# Patient Record
Sex: MALE | Race: WHITE | NOT HISPANIC OR LATINO | Employment: OTHER | URBAN - METROPOLITAN AREA
[De-identification: names, ages, dates, MRNs, and addresses within clinical notes are randomized per-mention and may not be internally consistent; named-entity substitution may affect disease eponyms.]

---

## 2017-02-20 ENCOUNTER — HISTORICAL (OUTPATIENT)
Dept: WOUND CARE | Facility: HOSPITAL | Age: 72
End: 2017-02-20

## 2017-04-05 ENCOUNTER — HISTORICAL (OUTPATIENT)
Dept: CARDIOLOGY | Facility: CLINIC | Age: 72
End: 2017-04-05

## 2017-05-17 ENCOUNTER — HISTORICAL (OUTPATIENT)
Dept: ADMINISTRATIVE | Facility: HOSPITAL | Age: 72
End: 2017-05-17

## 2017-05-17 LAB
ABS NEUT (OLG): 5.2 X10(3)/MCL (ref 2.1–9.2)
ALBUMIN SERPL-MCNC: 3.7 GM/DL (ref 3.4–5)
ALBUMIN/GLOB SERPL: 1 {RATIO}
ALP SERPL-CCNC: 75 UNIT/L (ref 20–120)
ALT SERPL-CCNC: 89 UNIT/L
AST SERPL-CCNC: 44 UNIT/L
BASOPHILS # BLD AUTO: 0.04 X10(3)/MCL
BASOPHILS NFR BLD AUTO: 0 % (ref 0–1)
BILIRUB SERPL-MCNC: 1.1 MG/DL
BILIRUBIN DIRECT+TOT PNL SERPL-MCNC: <0.1 MG/DL
BILIRUBIN DIRECT+TOT PNL SERPL-MCNC: >1 MG/DL
BUN SERPL-MCNC: 31 MG/DL (ref 7–25)
CALCIUM SERPL-MCNC: 9.3 MG/DL (ref 8.4–10.3)
CHLORIDE SERPL-SCNC: 107 MMOL/L (ref 96–110)
CHOLEST SERPL-MCNC: 174 MG/DL
CHOLEST/HDLC SERPL: 5 {RATIO} (ref 0–5)
CO2 SERPL-SCNC: 26 MMOL/L (ref 24–32)
CREAT SERPL-MCNC: 1.35 MG/DL (ref 0.7–1.4)
EOSINOPHIL # BLD AUTO: 0.16 X10(3)/MCL
EOSINOPHIL NFR BLD AUTO: 2 % (ref 0–5)
ERYTHROCYTE [DISTWIDTH] IN BLOOD BY AUTOMATED COUNT: 15 % (ref 11.5–14.5)
EST. AVERAGE GLUCOSE BLD GHB EST-MCNC: 128 MG/DL
GLOBULIN SER-MCNC: 2.6 GM/ML (ref 2.3–3.5)
GLUCOSE SERPL-MCNC: 119 MG/DL (ref 65–99)
HBA1C MFR BLD: 6.1 % (ref 4.7–5.6)
HCT VFR BLD AUTO: 41.4 % (ref 40–51)
HDLC SERPL-MCNC: 35 MG/DL
HGB BLD-MCNC: 13.7 GM/DL (ref 13.5–17.5)
IMM GRANULOCYTES # BLD AUTO: 0.02 10*3/UL
IMM GRANULOCYTES NFR BLD AUTO: 0 %
LDLC SERPL CALC-MCNC: 116 MG/DL (ref 0–130)
LYMPHOCYTES # BLD AUTO: 1.45 X10(3)/MCL
LYMPHOCYTES NFR BLD AUTO: 20 % (ref 15–40)
MCH RBC QN AUTO: 29.2 PG (ref 26–34)
MCHC RBC AUTO-ENTMCNC: 33.1 GM/DL (ref 31–37)
MCV RBC AUTO: 88.3 FL (ref 80–100)
MONOCYTES # BLD AUTO: 0.57 X10(3)/MCL
MONOCYTES NFR BLD AUTO: 8 % (ref 4–12)
NEUTROPHILS # BLD AUTO: 5.2 X10(3)/MCL
NEUTROPHILS NFR BLD AUTO: 70 X10(3)/MCL
PLATELET # BLD AUTO: 198 X10(3)/MCL (ref 130–400)
PMV BLD AUTO: 11 FL (ref 7.4–10.4)
POTASSIUM SERPL-SCNC: 4.3 MMOL/L (ref 3.6–5.2)
PROT SERPL-MCNC: 6.3 GM/DL (ref 6–8)
RBC # BLD AUTO: 4.69 X10(6)/MCL (ref 4.5–5.9)
SODIUM SERPL-SCNC: 141 MMOL/L (ref 135–146)
T4 FREE SERPL-MCNC: 0.99 NG/DL (ref 0.6–1.15)
TRIGL SERPL-MCNC: 114 MG/DL
TSH SERPL-ACNC: 0.74 MIU/L (ref 0.5–5)
VLDLC SERPL CALC-MCNC: 23 MG/DL
WBC # SPEC AUTO: 7.4 X10(3)/MCL (ref 4.5–11)

## 2017-07-12 ENCOUNTER — HISTORICAL (OUTPATIENT)
Dept: ADMINISTRATIVE | Facility: HOSPITAL | Age: 72
End: 2017-07-12

## 2017-11-10 ENCOUNTER — HISTORICAL (OUTPATIENT)
Dept: CARDIOLOGY | Facility: HOSPITAL | Age: 72
End: 2017-11-10

## 2017-11-10 LAB
ALBUMIN SERPL-MCNC: 3.8 GM/DL (ref 3.4–5)
ALBUMIN/GLOB SERPL: 1 RATIO (ref 1–2)
ALP SERPL-CCNC: 107 UNIT/L (ref 45–117)
ALT SERPL-CCNC: 31 UNIT/L (ref 12–78)
AST SERPL-CCNC: 21 UNIT/L (ref 15–37)
BILIRUB SERPL-MCNC: 0.5 MG/DL (ref 0.2–1)
BILIRUBIN DIRECT+TOT PNL SERPL-MCNC: 0.2 MG/DL
BILIRUBIN DIRECT+TOT PNL SERPL-MCNC: 0.3 MG/DL
BUN SERPL-MCNC: 20 MG/DL (ref 7–18)
CALCIUM SERPL-MCNC: 9.7 MG/DL (ref 8.5–10.1)
CHLORIDE SERPL-SCNC: 106 MMOL/L (ref 98–107)
CHOLEST SERPL-MCNC: 193 MG/DL
CHOLEST/HDLC SERPL: 4.6 {RATIO} (ref 0–5)
CO2 SERPL-SCNC: 29 MMOL/L (ref 21–32)
CREAT SERPL-MCNC: 1.2 MG/DL (ref 0.6–1.3)
GLOBULIN SER-MCNC: 3.6 GM/ML (ref 2.3–3.5)
GLUCOSE SERPL-MCNC: 115 MG/DL (ref 74–106)
HDLC SERPL-MCNC: 42 MG/DL
LDLC SERPL CALC-MCNC: 110 MG/DL (ref 0–130)
POTASSIUM SERPL-SCNC: 4.4 MMOL/L (ref 3.5–5.1)
PROT SERPL-MCNC: 7.4 GM/DL (ref 6.4–8.2)
SODIUM SERPL-SCNC: 142 MMOL/L (ref 136–145)
TRIGL SERPL-MCNC: 207 MG/DL
VLDLC SERPL CALC-MCNC: 41 MG/DL

## 2018-01-19 ENCOUNTER — HISTORICAL (OUTPATIENT)
Dept: CARDIOLOGY | Facility: CLINIC | Age: 73
End: 2018-01-19

## 2018-01-19 LAB
ALBUMIN SERPL-MCNC: 3.7 GM/DL (ref 3.4–5)
ALBUMIN/GLOB SERPL: 1 RATIO (ref 1–2)
ALP SERPL-CCNC: 109 UNIT/L (ref 45–117)
ALT SERPL-CCNC: 25 UNIT/L (ref 12–78)
AST SERPL-CCNC: 22 UNIT/L (ref 15–37)
BILIRUB SERPL-MCNC: 0.4 MG/DL (ref 0.2–1)
BILIRUBIN DIRECT+TOT PNL SERPL-MCNC: 0.1 MG/DL
BILIRUBIN DIRECT+TOT PNL SERPL-MCNC: 0.3 MG/DL
BUN SERPL-MCNC: 33 MG/DL (ref 7–18)
CALCIUM SERPL-MCNC: 9.2 MG/DL (ref 8.5–10.1)
CHLORIDE SERPL-SCNC: 108 MMOL/L (ref 98–107)
CHOLEST SERPL-MCNC: 118 MG/DL
CHOLEST/HDLC SERPL: 3.2 {RATIO} (ref 0–5)
CO2 SERPL-SCNC: 27 MMOL/L (ref 21–32)
CREAT SERPL-MCNC: 1.3 MG/DL (ref 0.6–1.3)
GLOBULIN SER-MCNC: 3.5 GM/ML (ref 2.3–3.5)
GLUCOSE SERPL-MCNC: 113 MG/DL (ref 74–106)
HDLC SERPL-MCNC: 37 MG/DL
LDLC SERPL CALC-MCNC: 53 MG/DL (ref 0–130)
POTASSIUM SERPL-SCNC: 4.2 MMOL/L (ref 3.5–5.1)
PROT SERPL-MCNC: 7.2 GM/DL (ref 6.4–8.2)
SODIUM SERPL-SCNC: 141 MMOL/L (ref 136–145)
TRIGL SERPL-MCNC: 138 MG/DL
VLDLC SERPL CALC-MCNC: 28 MG/DL

## 2018-03-15 ENCOUNTER — HISTORICAL (OUTPATIENT)
Dept: ADMINISTRATIVE | Facility: HOSPITAL | Age: 73
End: 2018-03-15

## 2018-05-07 ENCOUNTER — HISTORICAL (OUTPATIENT)
Dept: RADIOLOGY | Facility: HOSPITAL | Age: 73
End: 2018-05-07

## 2019-01-17 ENCOUNTER — HISTORICAL (OUTPATIENT)
Dept: CARDIOLOGY | Facility: CLINIC | Age: 74
End: 2019-01-17

## 2019-02-05 ENCOUNTER — HISTORICAL (OUTPATIENT)
Dept: RADIOLOGY | Facility: HOSPITAL | Age: 74
End: 2019-02-05

## 2019-05-12 ENCOUNTER — HOSPITAL ENCOUNTER (OUTPATIENT)
Dept: MEDSURG UNIT | Facility: HOSPITAL | Age: 74
End: 2019-05-13
Attending: INTERNAL MEDICINE | Admitting: INTERNAL MEDICINE

## 2019-10-04 ENCOUNTER — HISTORICAL (OUTPATIENT)
Dept: INTERNAL MEDICINE | Facility: CLINIC | Age: 74
End: 2019-10-04

## 2019-11-13 ENCOUNTER — HISTORICAL (OUTPATIENT)
Dept: RADIOLOGY | Facility: HOSPITAL | Age: 74
End: 2019-11-13

## 2020-01-07 ENCOUNTER — HISTORICAL (OUTPATIENT)
Dept: INTERNAL MEDICINE | Facility: CLINIC | Age: 75
End: 2020-01-07

## 2020-01-20 ENCOUNTER — HISTORICAL (OUTPATIENT)
Dept: ADMINISTRATIVE | Facility: HOSPITAL | Age: 75
End: 2020-01-20

## 2020-02-26 ENCOUNTER — HISTORICAL (OUTPATIENT)
Dept: RADIOLOGY | Facility: HOSPITAL | Age: 75
End: 2020-02-26

## 2020-03-11 ENCOUNTER — HISTORICAL (OUTPATIENT)
Dept: CARDIOLOGY | Facility: HOSPITAL | Age: 75
End: 2020-03-11

## 2020-03-20 ENCOUNTER — HISTORICAL (OUTPATIENT)
Dept: RESPIRATORY THERAPY | Facility: HOSPITAL | Age: 75
End: 2020-03-20

## 2020-04-01 ENCOUNTER — HOSPITAL ENCOUNTER (OUTPATIENT)
Dept: MEDSURG UNIT | Facility: HOSPITAL | Age: 75
End: 2020-04-03
Attending: INTERNAL MEDICINE | Admitting: INTERNAL MEDICINE

## 2020-04-21 ENCOUNTER — HISTORICAL (OUTPATIENT)
Dept: LAB | Facility: HOSPITAL | Age: 75
End: 2020-04-21

## 2020-04-23 ENCOUNTER — HISTORICAL (OUTPATIENT)
Dept: CARDIOLOGY | Facility: HOSPITAL | Age: 75
End: 2020-04-23

## 2020-09-09 ENCOUNTER — HISTORICAL (OUTPATIENT)
Dept: RADIOLOGY | Facility: HOSPITAL | Age: 75
End: 2020-09-09

## 2020-11-10 ENCOUNTER — HISTORICAL (OUTPATIENT)
Dept: LAB | Facility: HOSPITAL | Age: 75
End: 2020-11-10

## 2020-12-01 ENCOUNTER — HISTORICAL (OUTPATIENT)
Dept: RADIOLOGY | Facility: HOSPITAL | Age: 75
End: 2020-12-01

## 2021-10-26 ENCOUNTER — HISTORICAL (OUTPATIENT)
Dept: ADMINISTRATIVE | Facility: HOSPITAL | Age: 76
End: 2021-10-26

## 2021-10-26 LAB
ABS NEUT (OLG): 6.21 X10(3)/MCL (ref 2.1–9.2)
ALBUMIN SERPL-MCNC: 3.9 GM/DL (ref 3.4–4.8)
ALBUMIN/GLOB SERPL: 1.2 RATIO (ref 1.1–2)
ALP SERPL-CCNC: 104 UNIT/L (ref 40–150)
ALT SERPL-CCNC: 22 UNIT/L (ref 0–55)
ANTINUCLEAR ANTIBODY SCREEN (OHS): NEGATIVE
APPEARANCE, UA: CLEAR
AST SERPL-CCNC: 25 UNIT/L (ref 5–34)
BACTERIA #/AREA URNS AUTO: ABNORMAL /HPF
BASOPHILS # BLD AUTO: 0 X10(3)/MCL (ref 0–0.2)
BASOPHILS NFR BLD AUTO: 0 %
BILIRUB SERPL-MCNC: 0.7 MG/DL
BILIRUB UR QL STRIP: NEGATIVE
BILIRUBIN DIRECT+TOT PNL SERPL-MCNC: 0.3 MG/DL (ref 0–0.5)
BILIRUBIN DIRECT+TOT PNL SERPL-MCNC: 0.4 MG/DL (ref 0–0.8)
BUN SERPL-MCNC: 25.4 MG/DL (ref 8.4–25.7)
CALCIUM SERPL-MCNC: 10.1 MG/DL (ref 8.7–10.5)
CHLORIDE SERPL-SCNC: 107 MMOL/L (ref 98–107)
CHOLEST SERPL-MCNC: 140 MG/DL
CHOLEST/HDLC SERPL: 4 {RATIO} (ref 0–5)
CO2 SERPL-SCNC: 26 MMOL/L (ref 23–31)
COLOR UR: YELLOW
CREAT SERPL-MCNC: 1.35 MG/DL (ref 0.73–1.18)
CREAT UR-MCNC: 39.7 MG/DL (ref 58–161)
DEPRECATED CALCIDIOL+CALCIFEROL SERPL-MC: 78.8 NG/ML (ref 30–80)
DSDNA ANTIBODY (OHS): NEGATIVE
EOSINOPHIL # BLD AUTO: 0.2 X10(3)/MCL (ref 0–0.9)
EOSINOPHIL NFR BLD AUTO: 2 %
ERYTHROCYTE [DISTWIDTH] IN BLOOD BY AUTOMATED COUNT: 18.3 % (ref 11.5–14.5)
EST CREAT CLEARANCE SER (OHS): 43.91 ML/MIN
EST. AVERAGE GLUCOSE BLD GHB EST-MCNC: 128.4 MG/DL
GLOBULIN SER-MCNC: 3.3 GM/DL (ref 2.4–3.5)
GLUCOSE (UA): NEGATIVE
GLUCOSE SERPL-MCNC: 124 MG/DL (ref 82–115)
HBA1C MFR BLD: 6.1 %
HCT VFR BLD AUTO: 34.4 % (ref 40–51)
HDLC SERPL-MCNC: 37 MG/DL (ref 35–60)
HGB BLD-MCNC: 11.3 GM/DL (ref 13.5–17.5)
HGB UR QL STRIP: NEGATIVE
HYALINE CASTS #/AREA URNS LPF: ABNORMAL /LPF
IMM GRANULOCYTES # BLD AUTO: 0.03 10*3/UL
IMM GRANULOCYTES NFR BLD AUTO: 0 %
KETONES UR QL STRIP: NEGATIVE
LDLC SERPL CALC-MCNC: 88 MG/DL (ref 50–140)
LEUKOCYTE ESTERASE UR QL STRIP: NEGATIVE
LYMPHOCYTES # BLD AUTO: 1.3 X10(3)/MCL (ref 0.6–4.6)
LYMPHOCYTES NFR BLD AUTO: 16 %
MCH RBC QN AUTO: 27.4 PG (ref 26–34)
MCHC RBC AUTO-ENTMCNC: 32.8 GM/DL (ref 31–37)
MCV RBC AUTO: 83.3 FL (ref 80–100)
MONOCYTES # BLD AUTO: 0.6 X10(3)/MCL (ref 0.1–1.3)
MONOCYTES NFR BLD AUTO: 7 %
NEUTROPHILS # BLD AUTO: 6.21 X10(3)/MCL (ref 2.1–9.2)
NEUTROPHILS NFR BLD AUTO: 74 %
NITRITE UR QL STRIP: NEGATIVE
NRBC BLD AUTO-RTO: 0 % (ref 0–0.2)
PH UR STRIP: 6 [PH] (ref 4.5–8)
PLATELET # BLD AUTO: 256 X10(3)/MCL (ref 130–400)
PMV BLD AUTO: 9.4 FL (ref 7.4–10.4)
POTASSIUM SERPL-SCNC: 4.8 MMOL/L (ref 3.5–5.1)
PROT SERPL-MCNC: 7.2 GM/DL (ref 5.8–7.6)
PROT UR QL STRIP: 100 MG/DL
PROT UR STRIP-MCNC: 17.2 MG/DL
PROT/CREAT UR-RTO: 433.2 MG/GM CR
RBC # BLD AUTO: 4.13 X10(6)/MCL (ref 4.5–5.9)
RBC #/AREA URNS AUTO: ABNORMAL /HPF
SERINE PROT 3-ARUP: 1 AU/ML
SODIUM SERPL-SCNC: 138 MMOL/L (ref 136–145)
SP GR UR STRIP: 1.01 (ref 1–1.03)
SQUAMOUS #/AREA URNS LPF: ABNORMAL /LPF
TRIGL SERPL-MCNC: 74 MG/DL (ref 34–140)
UROBILINOGEN UR STRIP-ACNC: NORMAL
VLDLC SERPL CALC-MCNC: 15 MG/DL
WBC # SPEC AUTO: 8.4 X10(3)/MCL (ref 4.5–11)
WBC #/AREA URNS AUTO: ABNORMAL /HPF

## 2021-11-16 ENCOUNTER — HISTORICAL (OUTPATIENT)
Dept: RADIOLOGY | Facility: HOSPITAL | Age: 76
End: 2021-11-16

## 2021-11-22 ENCOUNTER — HISTORICAL (OUTPATIENT)
Dept: RADIOLOGY | Facility: HOSPITAL | Age: 76
End: 2021-11-22

## 2021-12-15 ENCOUNTER — HISTORICAL (OUTPATIENT)
Dept: RADIOLOGY | Facility: HOSPITAL | Age: 76
End: 2021-12-15

## 2022-01-31 ENCOUNTER — HISTORICAL (OUTPATIENT)
Dept: INTERNAL MEDICINE | Facility: CLINIC | Age: 77
End: 2022-01-31

## 2022-01-31 LAB
ALBUMIN SERPL-MCNC: 3.7 G/DL (ref 3.4–4.8)
ALBUMIN/GLOB SERPL: 1.2 {RATIO} (ref 1.1–2)
ALP SERPL-CCNC: 97 U/L (ref 40–150)
ALT SERPL-CCNC: 20 U/L (ref 0–55)
APPEARANCE, UA: CLEAR
AST SERPL-CCNC: 18 U/L (ref 5–34)
BACTERIA SPEC CULT: NORMAL
BILIRUB SERPL-MCNC: 0.6 MG/DL
BILIRUB UR QL STRIP: NEGATIVE
BILIRUBIN DIRECT+TOT PNL SERPL-MCNC: 0.2 (ref 0–0.5)
BILIRUBIN DIRECT+TOT PNL SERPL-MCNC: 0.4 (ref 0–0.8)
BUN SERPL-MCNC: 20.6 MG/DL (ref 8.4–25.7)
CALCIUM SERPL-MCNC: 9.5 MG/DL (ref 8.7–10.5)
CHLORIDE SERPL-SCNC: 111 MMOL/L (ref 98–107)
CO2 SERPL-SCNC: 23 MMOL/L (ref 23–31)
COLOR UR: YELLOW
CREAT SERPL-MCNC: 1.31 MG/DL (ref 0.73–1.18)
CREAT UR-MCNC: 199.6 MG/DL (ref 58–161)
GLOBULIN SER-MCNC: 3 G/DL (ref 2.4–3.5)
GLUCOSE (UA): NORMAL
GLUCOSE SERPL-MCNC: 109 MG/DL (ref 82–115)
HEMOLYSIS INTERF INDEX SERPL-ACNC: 1
HGB UR QL STRIP: NEGATIVE
HYALINE CASTS #/AREA URNS LPF: NORMAL /[LPF]
ICTERIC INTERF INDEX SERPL-ACNC: 1
KETONES UR QL STRIP: NEGATIVE
LEUKOCYTE ESTERASE UR QL STRIP: NEGATIVE
LIPEMIC INTERF INDEX SERPL-ACNC: 1
MUCOUS THREADS URNS QL MICRO: NORMAL
NITRITE UR QL STRIP: NEGATIVE
PH UR STRIP: 6 [PH] (ref 4.5–8)
POTASSIUM SERPL-SCNC: 4.4 MMOL/L (ref 3.5–5.1)
PROT SERPL-MCNC: 6.7 G/DL (ref 5.8–7.6)
PROT UR QL STRIP: NORMAL
PROT UR STRIP-MCNC: 175.8 MG/DL
PROT/CREAT UR-RTO: 880.8
RBC #/AREA URNS HPF: NORMAL /[HPF] (ref 0–5)
SODIUM SERPL-SCNC: 139 MMOL/L (ref 136–145)
SP GR UR STRIP: 1.02 (ref 1–1.03)
SQUAMOUS EPITHELIAL, UA: NORMAL
UROBILINOGEN UR STRIP-ACNC: NORMAL
WBC #/AREA URNS HPF: NORMAL /[HPF] (ref 0–5)

## 2022-03-02 ENCOUNTER — HISTORICAL (OUTPATIENT)
Dept: RADIOLOGY | Facility: HOSPITAL | Age: 77
End: 2022-03-02

## 2022-04-09 ENCOUNTER — HISTORICAL (OUTPATIENT)
Dept: ADMINISTRATIVE | Facility: HOSPITAL | Age: 77
End: 2022-04-09
Payer: COMMERCIAL

## 2022-04-25 VITALS
DIASTOLIC BLOOD PRESSURE: 62 MMHG | BODY MASS INDEX: 24.99 KG/M2 | HEIGHT: 68 IN | WEIGHT: 164.88 LBS | OXYGEN SATURATION: 99 % | SYSTOLIC BLOOD PRESSURE: 150 MMHG

## 2022-04-30 NOTE — PROGRESS NOTES
"   Patient:   Justyn Perez            MRN: [id number]            FIN: [id number]               Age:   71 years     Sex:  Male     :  1945   Associated Diagnoses:   Rotator cuff syndrome of left shoulder; Arthritis of left shoulder region; Right wrist pain   Author:   Elliot Kelly NP      Chief Complaint   2017 13:06 CDT      Lt. shoulder pain and Rt. wrist pain        History of Present Illness   72 yo  male presents to ortho clinic for f/u for left shoulder pain and c/o right wrist pain.  Reports that he got "good" relief with previous  left shoulder injection and requests repeat injection today for symptom relief.  Admits doing previously instructed ROM/stretching exercises to left UE.  Reports that he is not currently having any pain to his right wrist but states "I had a little pain a few weeks ago and just wanted to get it checked today".  Denies any numbness/tingling to bilateral UE.  Admits taking prescribed medication as needed for pain with moderate relief.  No other complaints today.            Review of Systems   ROS reviewed as documented in chart      Health Status   Allergies:    Allergic Reactions (Selected)  Severity Not Documented  Penicillin G benzathine- No reactions were documented.,    Allergies (1) Active Reaction  penicillin G benzathine None Documented     Current medications:  (Selected)   Outpatient Medications  Ordered  Triple Antibiotic Ointment topical: 1 dose(s), form: Ointment, TOP, Once, first dose 12/07/15 11:00:00 CST, stop date 12/07/15 11:00:00 CST, Apply to procedure site prior to discharge.  Prescriptions  Prescribed  Norvasc 10 mg oral tablet: 10 mg = 1 tab(s), Oral, Daily, # 30 tab(s), 6 Refill(s), Pharmacy: [facility]  atorvastatin 80 mg oral tablet: 80 mg = 1 tab(s), Oral, Daily, # 90 tab(s), 3 Refill(s), Pharmacy: [facility]  carvedilol 25 mg oral tablet: 25 mg = 1 tab(s), Oral, BID, # 180 tab(s), 3 Refill(s), Pharmacy: " [facility]  hydrOXYzine hydrochloride 50 mg oral tablet: 50 mg = 1 tab(s), Oral, Once a day (at bedtime), # 90 tab(s), 2 Refill(s), Pharmacy: [facility]  lactulose 10 g/15 mL oral syrup: 20 gm = 30 mL, Oral, TID, # 480 mL, 3 Refill(s), Pharmacy: [facility]  lisinopril 10 mg oral tablet: 20 mg = 2 tab(s), Oral, Daily, # 180 tab(s), 3 Refill(s), Pharmacy: [facility]  meloxicam 7.5 mg oral tablet: 7.5 mg = 1 tab(s), Oral, BID, # 60 tab(s), 3 Refill(s), Pharmacy: [facility]  Documented Medications  Documented  Nitrostat 0.4 mg sublingual TAB: 0.4 mg = 1 tab(s), SL, q5min, PRN for chest pain, # 100 tab(s), 0 Refill(s)  Tylenol 8 HR Arthritis Pain: 1,300 mg, Oral, q8hr, 0 Refill(s)  Viagra 50 mg oral tablet: 50 mg = 1 tab(s), Oral, Daily, 0 Refill(s)  aspirin 325 mg oral tablet: 325 mg = 1 tab(s), Oral, Daily, # 30 tab(s), 0 Refill(s),    Home Medications (11) Active  aspirin 325 mg oral tablet 325 mg = 1 tab(s), Oral, Daily  atorvastatin 80 mg oral tablet 80 mg = 1 tab(s), Oral, Daily  carvedilol 25 mg oral tablet 25 mg = 1 tab(s), Oral, BID  hydrOXYzine hydrochloride 50 mg oral tablet 50 mg = 1 tab(s), Oral, Once a day (at bedtime)  lactulose 10 g/15 mL oral syrup 20 gm = 30 mL, Oral, TID  lisinopril 10 mg oral tablet 20 mg = 2 tab(s), Oral, Daily  meloxicam 7.5 mg oral tablet 7.5 mg = 1 tab(s), Oral, BID  Nitrostat 0.4 mg sublingual TAB 0.4 mg = 1 tab(s), PRN, SL, q5min  Norvasc 10 mg oral tablet 10 mg = 1 tab(s), Oral, Daily  Tylenol 8 HR Arthritis Pain 1,300 mg, Oral, q8hr  Viagra 50 mg oral tablet 50 mg = 1 tab(s), Oral, Daily  ,    No qualifying data available     Problem list:    All Problems  Aortic stenosis, severe(  Confirmed  ) / SNOMED CT 76569598 / Confirmed  Aortic valve disorder / SNOMED CT 04115119 / Confirmed  Chest pain(  Confirmed  ) / SNOMED CT 20491246 / Confirmed  Congestive heart failure(  Confirmed  ) / SNOMED CT 80811608 / Confirmed  Constipation /  SNOMED CT 06519974 / Confirmed  HLD (hyperlipidemia)(  Confirmed  ) / SNOMED CT 19102057 / Confirmed  HTN (hypertension)(  Confirmed  ) / SNOMED CT 5399452068 / Confirmed  Insomnia / SNOMED CT 226147912 / Confirmed  PVD (peripheral vascular disease)(  Confirmed  ) / SNOMED CT 5238635260 / Confirmed,    Active Problems (9)  Aortic stenosis, severe(  Confirmed  )   Aortic valve disorder   Chest pain(  Confirmed  )   Congestive heart failure(  Confirmed  )   Constipation   HLD (hyperlipidemia)(  Confirmed  )   HTN (hypertension)(  Confirmed  )   Insomnia   PVD (peripheral vascular disease)(  Confirmed  )         Histories   Past Medical History:    No active or resolved past medical history items have been selected or recorded.   Family History:    CVA (cerebral infarction)  Father  Parkinson disease.  Mother     Procedure history:    Femoral-popliteal artery bypass graft with vein ([id number]) on 1/1/2010 at 64 Years.  Comments:  2/12/2015 16:38 - Contributor_system, [email or web address]  06/17/2014 15:52:01 - Alva Quintero: with grafts  Aorta to superior mesenteric artery bypass graft ([id number]) on 1/1/2009 at 63 Years.  Fluoroscopy and stent renal artery ([id number]) on 1/1/2008 at 62 Years.  CEA - Carotid endarterectomy ([id number]) on 1/1/2006 at 60 Years.   Social History        Social & Psychosocial Habits    Alcohol  09/11/2014 Risk Assessment: Denies Alcohol Use    08/11/2016  Use: Past    Employment/School  04/10/2015  Status: Retired    Highest education: High school    Exercise  03/06/2015 Risk Assessment: Does not exercise    02/15/2017  Times per week: 3-4 times/week    Self assessment: Fair condition    Exercise type: Walking, Weight lifting    Home/Environment  04/10/2015  Lives with: Spouse    Living situation: Home/Independent    Alcohol abuse in household: No    Substance abuse in household: No    Smoker in household: Yes    Feels unsafe at home: No    Safe place to go: Yes     Family/Friends available to help: Yes    Nutrition/Health  02/15/2017  Type of diet: Regular    Caffeine intake amount: 2 coke zero per day    Substance Abuse  09/11/2014 Risk Assessment: Denies Substance Abuse    08/11/2016  Use: Never    Tobacco  09/11/2014 Risk Assessment: Denies Tobacco Use    08/11/2016  Use: Former smoker    Comment: QUIT 10-12 YEARS AGO - 08/11/2016 11:47 - Misty Kirkland LPN        Physical Examination   Measurements from flowsheet : Measurements   7/12/2017 13:06 CDT      Weight Dosing             76.4 kg                             Weight Measured           76.4 kg                             Weight Measured and Calculated in Lbs     168.43 lb                             Height/Length Dosing      172.72 cm                             Height/Length Measured    172.72 cm                             BSA Measured              1.91 m2                             Body Mass Index Measured  25.61 kg/m2     General:  Alert and oriented.    HENT:  Normocephalic.    Neck:  Supple.    Integumentary:  Warm, Dry, Pink.    Neurologic:  No focal deficits.    Cognition and Speech:  Speech clear and coherent.    Psychiatric:  Appropriate mood & affect.    Left shoulder exam:  Good ROM with increased pain associated with upward/lateral movement.  TTP to supraspinatus tendon and AC joint.  Supraspinatus stress (+).  Drop arm (-).  Bennett (+).  Neer (-).  NV intact.  No swelling or gross deformity noted.  +2 radial pulse.  FF = 140.  ER = 50.  IR = L-spine.  Abduction = 120.  No periscapular atrophy noted.   Right wrist exam:  Good ROM with no pain associated with movement.  No TTP noted.  No swelling, bruising or erythema noted.  NV intact.  +2 radial pulse.  Skin intact.  Cap refill < 2 sec.      Health Maintenance      Health Maintenance     Pending (in the next year)        OverDue           Aspirin Therapy for CVD Prevention due  09/11/15  and every 1  year(s)           Colorectal Screening due   04/28/16  and every 1  year(s)        Due            Abdominal Aortic Aneurysm Screening due  07/12/17  and every 1  year(s)           Alcohol Misuse Screening due  07/12/17  and every 1  year(s)           HF-Ejection Fraction Past 13 Months if Hospitalized due  07/12/17  and every 1  year(s)           HF-Fluid Restriction/Low Sodium Diet Education due  07/12/17  Variable frequency           HF-Heart Failure Education due  07/12/17  and every 1  year(s)           HF-LVEF due  07/12/17  and every 1  year(s)           Hypertension Management-Education due  07/12/17  and every 1  year(s)           Smoking Cessation due  07/12/17  and every 1  year(s)           Zoster Vaccine due  07/12/17  and every 100  year(s)        Due In Future            Influenza Vaccine not due until  10/02/17  and every 1  year(s)           Hypertension Management-Blood Pressure not due until  11/17/17  and every 6  month(s)           HF-ACEI/ARB Prescribed if Clinically Indicated not due until  12/12/17  and every 1  year(s)           HF-Beta Blocker Prescribed if Clinically Indicated not due until  12/12/17  and every 1  year(s)           Hypertension Management-BMP not due until  05/17/18  and every 1  year(s)           Blood Pressure Screening not due until  05/17/18  and every 1  year(s)           Depression Screening not due until  05/17/18  and every 1  year(s)     Satisfied (in the past 1 year)        Satisfied            Blood Pressure Screening on  05/17/17.  Satisfied by Elgin Jenkins RN           Body Mass Index Check on  07/12/17.  Satisfied by Jazzmine Fisher LPN           Depression Screening on  05/17/17.  Satisfied by Elgin Jenkins RN           Diabetes Screening on  05/17/17.  Satisfied by Yisel Mayorga           HF-ACEI/ARB Prescribed if Clinically Indicated on  12/12/16.  Satisfied by Thuan Dominguez           HF-Beta Blocker Prescribed if Clinically Indicated on  12/12/16.   Satisfied by Thuan Dominguez           Hypertension Management-Blood Pressure on  05/17/17.  Satisfied by Elgin Jenkins RN           Obesity Screening on  07/12/17.  Satisfied by Jazzmine Fisher LPN           Pneumococcal Vaccine on  02/15/17.  Satisfied by Becky Wright LPN          Procedure   Joint aspiration/ injection procedure   Date/ Time:  7/12/2017 14:43:00.     Confirmed: patient, procedure, side, site, safety procedures followed.     Performed by: self.     Informed consent: signed by patient.     Indication: symptomatic relief.     Location: the left shoulder.     Preparation and technique: skin prep alcohol/iodophor (Dura Prep), sterile preparation of site in usual fashion.     Joint injected: with  Lidocaine 1% 5mL and Kenalog 40mg 1mL.     Procedure tolerated: well.        Review / Management   Results review:     No qualifying data available.       Left shoulder x-rays (4/6/16):    Radiology Report  Exam: Left shoulder 4 views.      COMPARISON: None.     INDICATION: Left shoulder injury due to fall 15 years ago. Chronic  pain with no new trauma. Initial assessment.     FINDINGS: There are mild to moderate acromioclavicular and  glenohumeral joint degenerative changes. There is moderate aortic  atherosclerosis. There is mild cardiomegaly. There are interstitial  opacities within the image lung suggestive of fibrosis and scarring,  with mild pulmonary vascular congestion and pulmonary edema not  excluded. There are tiny left neck metallic soft tissue clips. The  axillary view demonstrates mild humeral head irregularity, which may  be related to remote trauma. There is no acute fracture or  dislocation.     IMPRESSION:     Mild to moderate left shoulder degenerative changes.        Impression and Plan   Diagnosis     Rotator cuff syndrome of left shoulder ([id number] M75.102).     Arthritis of left shoulder region (HYQ10-FJ M19.012).     Right wrist pain ([id number]  M25.531).       Plan:  1.  Left shoulder injection.  2.  Continue ROM/stretching exercises to left UE as previously instructed.  3.  Continue prescribed medication as needed for pain relief.  4.  Ice compresses and activity modifications as needed for symptom relief.  5.  RTC PRN.       Patient Instructions:  Joint Injection, Care After, Osteoarthritis, Rotator Cuff Injury.

## 2022-04-30 NOTE — ED PROVIDER NOTES
"   Patient:   Justyn Perez            MRN: [id number]            FIN: [id number]               Age:   73 years     Sex:  Male     :  1945   Associated Diagnoses:   CHF NYHA class I   Author:   Nivia Christianson MD      Basic Information   Time seen: Date & time 2019 08:00:00.   History source: Patient.   Arrival mode: Private vehicle.   History limitation: None.   Additional information: Chief Complaint from Nursing Triage Note : Chief Complaint   2019 7:47 CDT       Chief Complaint           Pt c/o epigastric pain and sob x 3 weeks. Pt denies CP, pt states "when I eat I feel like my food just stays in my chest." Pt denies difficulty swallowing/choking  .      History of Present Illness   The patient presents with difficulty breathing and Bloating sensation in the stomach.  The onset was 4  weeks ago.  The course/duration of symptoms is constant.  Degree at onset mild.  Degree at present mild.  The Exacerbating factors is exertion.  The Relieving factors is rest.  Risk factors consist of congestive heart failure and hypertension.  Prior episodes: occasional.  Associated symptoms: chest pain, abdominal pain, Mainly bloating sensation in the epigastric region, denies nausea and denies vomiting.  According to patient he went to [facility] on the Easter and he was dancing and got short of breath, he had to sit down in past and also that he was able to dance without any problem.  Next, he had an episode a few days back, very got short of breath again, but he got better without doing any particular thing, and then he has been feeling like the food is sitting in his stomach and does not go down.  Today decided to come get checked..        Review of Systems   Constitutional symptoms:  No fever, no chills, no sweats.    Skin symptoms:  No jaundice, no rash.    Eye symptoms:  Negative except as documented in HPI.   ENMT symptoms:  No sore throat,    Respiratory symptoms:  Shortness of breath, " no cough, no wheezing.    Cardiovascular symptoms:  No chest pain, no palpitations, no tachycardia.    Gastrointestinal symptoms:  Abdominal pain, epigastric, pressure, no nausea, no vomiting, no diarrhea, no constipation.    Genitourinary symptoms:  No dysuria,    Musculoskeletal symptoms:  No back pain,    Neurologic symptoms:  No headache, no dizziness.    Psychiatric symptoms:  No anxiety, no depression, no substance abuse.    Allergy/immunologic symptoms:  No seasonal allergies,              Additional review of systems information: All other systems reviewed and otherwise negative.      Health Status   Allergies:    Allergic Reactions (Selected)  Severity Not Documented  Penicillin G benzathine- No reactions were documented..   Medications:  (Selected)   Prescriptions  Prescribed  atorvastatin 80 mg oral tablet: 80 mg = 1 tab(s), Oral, Daily, # 90 tab(s), 2 Refill(s), Pharmacy: [facility]  carvedilol 25 mg oral tablet: 25 mg = 1 tab(s), Oral, BID, # 180 tab(s), 2 Refill(s), Pharmacy: [facility]  Documented Medications  Documented  sildenafil 50 mg oral tablet: 50 mg = 1 tab(s), Oral, Daily, # 30 tab(s), 0 Refill(s), per nurse's notes.      Past Medical/ Family/ Social History   Medical history:    Active  Aortic stenosis, Mild (SNOMED CT 17287894): Onset on 5/1/2018 at 72 years.  Congestive heart failure(  Confirmed  ) (SNOMED CT 43687499)  PVD (peripheral vascular disease)(  Confirmed  ) (SNOMED CT 3748027102)  HTN (hypertension) (SNOMED CT 6489685026)  Resolved  Cardiac ejection fraction 61% (SNOMED CT 083510238): Onset on 5/1/2018 at 72 years.  Resolved.  Comments:  5/12/2019 CDT 8:00 CDT - Nivia Christianson MD  61% on ECHO, Reviewed as documented in chart.   Surgical history:    Femoral-popliteal artery bypass graft with vein ([id number]) on 1/1/2010 at 64 Years.  Comments:  2/12/2015 16:38 - Contributor_system, [id number]  06/17/2014 15:52:01 - Alva Quintero: with grafts  Aorta to  superior mesenteric artery bypass graft ([id number]) on 1/1/2009 at 63 Years.  Fluoroscopy and stent renal artery ([id number]) on 1/1/2008 at 62 Years.  CEA - Carotid endarterectomy ([id number]) on 1/1/2006 at 60 Years., Reviewed as documented in chart.   Family history:    CVA (cerebral infarction)  Father  Parkinson disease.  Mother  , Reviewed as documented in chart.   Social history: Alcohol use: Denies, Tobacco use: Denies, Drug use: Denies.   Problem list:    Active Problems (13)  Aortic stenosis, Mild   Aortic valve disorder   Chest pain(  Confirmed  )   Congestive heart failure(  Confirmed  )   Constipation   HLD (hyperlipidemia)(  Confirmed  )   HTN (hypertension)   HTN (hypertension)(  Confirmed  )   Insomnia   Joint pain   PVD (peripheral vascular disease)   PVD (peripheral vascular disease)(  Confirmed  )   Well adult exam , per nurse's notes.      Physical Examination               Vital Signs   Vital Signs   5/12/2019 7:47 CDT       Temperature Oral          36.7 DegC                             Temperature Oral (calculated)             98.06 DegF                             Peripheral Pulse Rate     67 bpm                             Respiratory Rate          20 br/min                             SpO2                      97 %                             Oxygen Therapy            Room air                             Systolic Blood Pressure   178 mmHg  HI                             Diastolic Blood Pressure  86 mmHg  .   Measurements   5/12/2019 7:47 CDT       Weight Dosing             70.25 kg                             Weight Measured           70.25 kg                             Weight Measured and Calculated in Lbs     154.87 lb                             Height/Length Dosing      172 cm                             Height/Length Measured    172 cm                             Body Mass Index Measured  23.75 kg/m2  .   Basic Oxygen Information   5/12/2019 8:02 CDT       SpO2                       97 %                             Oxygen Therapy            Room air    2019 7:47 CDT       SpO2                      97 %                             Oxygen Therapy            Room air  .   General:  Alert, no acute distress.    Patti coma scale:  Total score: Total score: 15.   Neurological:  Alert and oriented to person, place, time, and situation, normal motor observed, normal speech observed.    Skin:  Normal for ethnicity.   Head:  Normocephalic.   Neck:  Supple.   Eye:  Extraocular movements are intact, No icterus.    Ears, nose, mouth and throat:  Oral mucosa moist, no pharyngeal erythema or exudate.    Cardiovascular:  Regular rate and rhythm, No murmur, Normal peripheral perfusion, Edema: Bilateral, pedal, 1+.    Respiratory:  Respirations are non-labored, breath sounds are equal, Breath sounds: Right, posterior, crackles present, rales present.    Back:  Nontender, Normal range of motion.    Musculoskeletal:  Normal ROM.   Chest wall   Gastrointestinal:  Soft, Nontender, Non distended, Normal bowel sounds.    Lymphatics   Psychiatric:  Cooperative, appropriate mood & affect.       Medical Decision Making   Documents reviewed:  Emergency department nurses' notes.   Orders  Launch Order Profile (Selected)   Inpatient Orders  Ordered  Blood Pressure: 19 8:12:00 CDT, Stop date 19 8:12:00 CDT, Monitor blood pressure.  Cardiac Monitorin19 8:12:00 CDT, Constant Order, Place on telemetry.  Pulse Oximetry: 19 8:12:00 CDT, Stop date 19 8:12:00 CDT, Place on pulse oximetry.  Monitor oxygen saturation.  Saline Lock Insert: 19 8:12:00 CDT, Stop date 19 8:12:00 CDT  Vital Signs: 19 8:12:00 CDT, q30min  Ordered (Exam Ordered)  XR Chest 2 Views: Stat, 19 8:12:00 CDT, Dyspnea, None, Wheelchair, Patient Has IV?, Rad Type, Not Scheduled, 19 8:12:00 CDT  Ordered (In-Lab)  Automated Diff: Stat collect, 19 8:12:00 CDT, Blood, Collected, Stop date  05/12/19 8:12:00 CDT, Lab Collect, 05/12/19 8:12:00 CDT  B-Type Natriuretic Peptide Pro Brain: Stat collect, 05/12/19 8:12:00 CDT, Blood, Stop date 05/12/19 8:12:00 CDT, Lab Collect, 05/12/19 8:12:00 CDT  CBC w/ Auto Diff: Stat collect, 05/12/19 8:12:00 CDT, Blood, Stop date 05/12/19 8:12:00 CDT, Lab Collect, 05/12/19 8:12:00 CDT  CK MB: Stat collect, 05/12/19 8:12:00 CDT, Blood, Stop date 05/12/19 8:12:00 CDT, Lab Collect, Print Label By Order Location, 05/12/19 8:12:00 CDT  CMP: Stat collect, 05/12/19 8:12:00 CDT, Blood, Stop date 05/12/19 8:12:00 CDT, Lab Collect, 05/12/19 8:12:00 CDT  CPK: Stat collect, 05/12/19 8:12:00 CDT, Blood, Stop date 05/12/19 8:12:00 CDT, Lab Collect, Print Label By Order Location, 05/12/19 8:12:00 CDT  Troponin-I: Stat collect, 05/12/19 8:12:00 CDT, Blood, Stop date 05/12/19 8:12:00 CDT, Lab Collect, 05/12/19 8:12:00 CDT  Completed  EKG Adult 12 Lead: 05/12/19 8:12:00 CDT, Stat, Chest Pain, 05/12/19 8:12:00 CDT, Wheelchair, Patient Has IV, Standard Precautions, Show to provider upon completion., -1, -1, 05/12/19 8:12:00 CDT.   Electrocardiogram:  Time 5/12/2019 07:52:00, rate 65, normal sinus rhythm, No ST-T changes, no ectopy, normal AL & QRS intervals, LVH with repolarization abnormality..    Results review:  Lab results : Lab View   5/12/2019 8:12 CDT       Sodium Lvl                141 mmol/L                             Potassium Lvl             4.1 mmol/L                             Chloride                  110 mmol/L  HI                             CO2                       27 mmol/L                             Calcium Lvl               9.1 mg/dL                             Glucose Lvl               133 mg/dL  HI                             BUN                       22 mg/dL  HI                             Creatinine                1.10 mg/dL                             eGFR-AA                   84 mL/min  LOW                             eGFR-MARY                  70 mL/min  LOW                              Bili Total                0.6 mg/dL                             Bili Direct               0.2 mg/dL                             Bili Indirect             0.4 mg/dL                             AST                       19 unit/L                             ALT                       25 unit/L                             Alk Phos                  86 unit/L                             Total Protein             6.2 gm/dL  LOW                             Albumin Lvl               3.0 gm/dL  LOW                             Globulin                  3.20 gm/mL                             A/G Ratio                 0.9 ratio  LOW                             NT pro BNP.               3,660 pg/mL  HI                             Total CK                  71 unit/L                             CK MB                     2.3 ng/mL                             Troponin-I                0.063 ng/mL  HI                             WBC                       7.6 x10(3)/mcL                             RBC                       4.23 x10(6)/mcL  LOW                             Hgb                       13.0 gm/dL  LOW                             Hct                       38.6 %  LOW                             Platelet                  275 x10(3)/mcL                             MCV                       91.3 fL                             MCH                       30.7 pg                             MCHC                      33.7 gm/dL                             RDW                       15.6 %  HI                             MPV                       10.7 fL  HI                             Abs Neut                  5.45 x10(3)/mcL                             Neutro Auto               71 x10(3)/mcL  NA                             Lymph Auto                17 %                             Mono Auto                 8 %                             Eos Auto                  2  NA                             Abs Eos                    0.18 x10(3)/mcL  NA                             Basophil Auto             1  NA                             Abs Neutro                5.45 x10(3)/mcL  NA                             Abs Lymph                 1.33 x10(3)/mcL  NA                             Abs Mono                  0.61 x10(3)/mcL  NA                             Abs Baso                  0.05 x10(3)/mcL  NA                             IG%                       0 %  NA                             IG#                       0.0200  NA  ,    No qualifying data available.    Radiology results:  05/12/2019 9:50; Meghan OSBORNE, [facility]; ;   XR Chest 2 views:  No infiltrate, mass or other acute cardiopulmonary abnormality.    .      Reexamination/ Reevaluation   Time: 5/12/2019 09:51:00 .   Vital signs   results included from flowsheet : Vital Signs   5/12/2019 9:00 CDT       Peripheral Pulse Rate     58 bpm  LOW                             Respiratory Rate          16 br/min                             SpO2                      97 %                             Oxygen Therapy            Room air                             Systolic Blood Pressure   157 mmHg  HI                             Diastolic Blood Pressure  92 mmHg  HI                             Mean Arterial Pressure, Cuff              114 mmHg     Course: well controlled.   Interventions: PowerOrders   Pharmacy:  nitroglycerin 2% transdermal ointment (Order): 1 in, form: Ointment, TOP, Once-NOW, first dose 5/12/2019 9:59 CDT, stop date 5/12/2019 9:59 CDT.   Notes: Patient is doing good at rest, he only gets short of breath after activity and some epigastric lower chest fullness after eating    Patient has rales in the right lower lung fields, chest x-ray does not show any particular pneumonia, he does have some vascular congestion, BNP slightly elevated, his troponin is 0.06, I am going to admit him in the hospital on telemetry, repeat his cardiac enzymes, and get the cardiologist see  him.  His last EF was 61% in May 2018..      Impression and Plan   Diagnosis   CHF NYHA class I ([id number] I50.9)      Calls-Consults   -  5/12/2019 09:53:00 , Miky OSBORNE, Tomas ARGUETA, recommends OK.    Plan   Disposition: Admit time  5/12/2019 09:57:00, Place in Observation Telemetry Unit.    Counseled: Patient, Family, Regarding diagnosis, Regarding diagnostic results.

## 2022-04-30 NOTE — ED PROVIDER NOTES
Patient:   Justyn Peerz            MRN: [id number]            FIN: [id number]               Age:   74 years     Sex:  Male     :  1945   Associated Diagnoses:   Acute exacerbation of congestive heart failure   Author:   Magy OSBORNE, Stu RODRIGUEZ      Basic Information   Time seen: Date 2020, Immediately upon arrival.   History source: Patient.   Arrival mode: Ambulance.   History limitation: None.   Additional information: Chief Complaint from Nursing Triage Note : Chief Complaint   2020 10:17 CDT       Chief Complaint           pt to ER via [facility] for CP.  started 4 days ago.  also SOB.  , nitrox3, nitro paste 1inch.  pt states needs valve replacement  .      History of Present Illness   This patient presents with shortness of breath.  Patient states that he began with shortness of breath over the last 4 days.  It slowly worsening.  At this point it is constant, moderate to severe and currently present.  Symptoms worse with exertion as well as with tension to lie flat.  Patient states he takes his weight daily and has not gained any weight.  The patient states she's also been having some chest tightness the last 3 or 4 days it's on and off.  He received aspirin, nitroglycerin prior to arrival.  Patient denies any fever or chills.  Denies any body aches.      Review of Systems   Constitutional symptoms:  Negative except as documented in HPI.   Skin symptoms:  Negative except as documented in HPI.   Eye symptoms:  Negative except as documented in HPI.   ENMT symptoms:  Negative except as documented in HPI.   Respiratory symptoms:  Negative except as documented in HPI.   Cardiovascular symptoms:  Negative except as documented in HPI.   Gastrointestinal symptoms:  Negative except as documented in HPI.   Musculoskeletal symptoms:  Negative except as documented in HPI.   Neurologic symptoms:  Negative except as documented in HPI.   Hematologic/Lymphatic symptoms:  Negative except as  documented in HPI.             Additional review of systems information: All other systems reviewed and otherwise negative.      Health Status   Allergies:    Allergic Reactions (Selected)  Severity Not Documented  Penicillin G benzathine- No reactions were documented..   Medications:  (Selected)   Prescriptions  Prescribed  Coreg 12.5 mg oral tablet: 12.5 mg = 1 tab(s), Oral, BID, Do not take if systolic blood pressure (top number) is less than 90, # 60 tab(s), 3 Refill(s), Pharmacy: [facility], 172.72, cm, Height/Length Dosing, 02/05/20 13:28:00 CST, 67.8, kg, Weight Dosing, 02/05/20 13:2...  DuoNeb 0.5 mg-2.5 mg/3 mL inhalation solution: 3 mL, INH, QID, PRN PRN as needed for shortness of breath or wheezing, # 90 mL, 3 Refill(s), Pharmacy: [facility], 172, cm, Height/Length Dosing, 01/07/20 14:42:00 CST, 68.5, kg, Weight Dosing, 01/07/20 14:42:00 CST  Flonase 50 mcg/inh nasal spray: 1 spray(s), Nasal, BID, PRN PRN allergy symptoms, in each nostril, # 16 gm, 3 Refill(s), Pharmacy: [facility], 172.72, cm, Height/Length Dosing, 02/05/20 13:28:00 CST, 67.8, kg, Weight Dosing, 02/05/20 13:28:00 CST  Lasix 20 mg oral tablet: 20 mg = 1 tab(s), Oral, Daily, PRN PRN swelling, # 45 tab(s), 3 Refill(s), Pharmacy: [facility], 172, cm, Height/Length Dosing, 01/07/20 14:42:00 CST, 68.5, kg, Weight Dosing, 01/07/20 14:42:00 CST  Nitrostat 0.4 mg sublingual tab: 0.4 mg = 1 tab(s), SL, q5min, PRN PRN for chest pain, # 25 tab(s), 5 Refill(s), Pharmacy: [facility], 172, cm, Height/Length Dosing, 01/07/20 14:42:00 CST, 68.5, kg, Weight Dosing, 01/07/20 14:42:00 CST  aspirin 81 mg oral tablet, CHEWABLE: 81 mg = 1 tab(s), Oral, Daily, # 30 tab(s), 6 Refill(s), Pharmacy: [facility], 172, cm, Height/Length Dosing, 01/07/20 14:42:00 CST, 68.5, kg, Weight Dosing, 01/07/20 14:42:00 CST  atorvastatin 80 mg oral tablet: 80 mg = 1 tab(s), Oral, Daily, # 90 tab(s), 2 Refill(s), Pharmacy: [facility], 172, cm, Height/Length Dosing, 01/07/20 14:42:00 CST, 68.5, kg, Weight Dosing, 01/07/20 14:42:00 CST  diclofenac 1% topical gel: 2 gm =, TOP, QID, PRN PRN as needed for pain, # 100 gm, 5 Refill(s), Pharmacy: [facility], 172, cm, Height/Length Dosing, 01/07/20 14:42:00 CST, 68.5, kg, Weight Dosing, 01/07/20 14:42:00 CST  gabapentin 300 mg oral capsule: 300 mg = 1 cap(s), Oral, At Bedtime, # 30 cap(s), 3 Refill(s), Pharmacy: [facility], 172, cm, Height/Length Dosing, 01/10/20 21:28:00 CST, 65.8, kg, Weight Dosing, 01/10/20 21:28:00 CST  lactulose 10 g/15 mL oral syrup: 20 gm = 30 mL, Oral, TID, # 480 mL, 3 Refill(s), Pharmacy: [facility]  lisinopril 5 mg oral tablet: 5 mg = 1 tab(s), Oral, Daily, # 30 tab(s), 3 Refill(s), Pharmacy: [facility], 172.72, cm, Height/Length Dosing, 02/05/20 13:28:00 CST, 67.8, kg, Weight Dosing, 02/05/20 13:28:00 CST  omeprazole 40 mg oral DR capsule: 40 mg = 1 cap(s), Oral, Daily, # 30 cap(s), 0 Refill(s), Pharmacy: [facility], 172.72, cm, Height/Length Dosing, 02/05/20 13:28:00 CST, 67.8, kg, Weight Dosing, 02/05/20 13:28:00 CST  sertraline 50 mg oral tablet: 50 mg = 1 tab(s), Oral, Daily, # 30 tab(s), 3 Refill(s), Pharmacy: [facility], 172, cm, Height/Length Dosing, 01/07/20 14:42:00 CST, 68.5, kg, Weight Dosing, 01/07/20 14:42:00 CST  traZODONE 50 mg oral tablet ( Desyrel ): 50 mg = 1 tab(s), Oral, Once a day (at bedtime), # 30 tab(s), 3 Refill(s), Pharmacy: [facility], 172, cm, Height/Length Dosing, 01/10/20 21:28:00 CST, 65.8, kg, Weight Dosing, 01/10/20 21:28:00 CST  Documented Medications  Documented  Osteo Bi-Flex 250 mg-200 mg oral tablet: 1 tab, Oral, Daily, 0 Refill(s)  Vitamin C 250 mg oral tablet: 1 tab, Oral, Daily, # 30 tab(s), 0 Refill(s)  carvedilol 3.125 mg oral tablet: 3.125 mg = 1 tab(s), Oral, BID  carvedilol 6.25 mg oral tablet: 6.25 mg = 1 tab(s), Oral, BID  levofloxacin 750 mg oral tablet: 750 mg = 1  tab(s), Oral, Daily  lisinopril 2.5 mg oral tablet: 2.5 mg = 1 tab(s), Oral, Daily  multivitamin with minerals (Adult Tab): 1 tab(s), Oral, Daily, # 30 tab(s), 0 Refill(s).      Past Medical/ Family/ Social History   Medical history:    Active  Aortic stenosis, Mild (67686400): Onset on 2018 at 72 years.  Congestive heart failure(  Confirmed  ) ([id number])  PVD (peripheral vascular disease)(  Confirmed  ) ([id number])  HTN (hypertension) (7142299360)  Resolved  Cardiac ejection fraction 61% ([id number]): Onset on 2018 at 72 years.  Resolved.  Comments:  2019 CDT 8:00 CDT - Nivia Christianson MD  61% on ECHO.   Surgical history:    Femoral-popliteal artery bypass graft with vein ([id number]) on 2010 at 64 Years.  Comments:  2015 16:38 CST - Contributor_system, [facility]  2014 15:52:01 - Alva Quintero: with grafts  Aorta to superior mesenteric artery bypass graft ([id number]) on 2009 at 63 Years.  Fluoroscopy and stent renal artery ([id number]) on 2008 at 62 Years.  CEA - Carotid endarterectomy ([id number]) on 2006 at 60 Years..   Family history:    CVA (cerebral infarction)  Father ()  Parkinson disease.  Mother ()  .      Physical Examination               Vital Signs   Vital Signs   2020 10:17 CDT       Temperature Temporal Artery               36.5 DegC                             Peripheral Pulse Rate     92 bpm                             Respiratory Rate          20 br/min                             SpO2                      96 %                             Oxygen Therapy            Room air                             Systolic Blood Pressure   162 mmHg  HI                             Diastolic Blood Pressure  97 mmHg  HI  .   Basic Oxygen Information   2020 10:17 CDT       SpO2                      96 %                             Oxygen Therapy            Room air  .   General:  Alert, moderate distress, anxious.    Skin:  Warm, dry.     Head:  Normocephalic, atraumatic.    Neck:  Supple, trachea midline, no JVD, no carotid bruit.    Eye:  Pupils are equal, round and reactive to light, extraocular movements are intact, normal conjunctiva, vision unchanged.    Ears, nose, mouth and throat:  Tympanic membranes clear, oral mucosa moist, no pharyngeal erythema or exudate.    Cardiovascular:  Regular rate and rhythm, No murmur, Normal peripheral perfusion.    Respiratory:  Breath sounds are equal, Decreased breath sounds on right.    Gastrointestinal:  Soft, Nontender, Non distended, Normal bowel sounds.    Back:  Nontender, Normal range of motion.    Musculoskeletal:  Normal ROM, normal strength, no tenderness, no swelling.    Neurological:  Alert and oriented to person, place, time, and situation, No focal neurological deficit observed, CN II-XII intact, normal sensory observed, normal motor observed, normal coordination observed, Speech: Normal.    Lymphatics:  No lymphadenopathy.   Psychiatric:  Cooperative, appropriate mood & affect.       Medical Decision Making   Documents reviewed:  Emergency department nurses' notes.   Electrocardiogram:  Time 4/1/2020 10:14:00, rate 98, normal sinus rhythm, no ectopy, EP Interp, QRS interval Left ventricular hypertrophy.    Results review:  Lab results : Lab View   4/1/2020 11:31 CDT       POC BNP iSTAT             3,674 pg/mL  HI    4/1/2020 11:16 CDT       POC Troponin              0.03 ng/mL    4/1/2020 11:00 CDT       Sodium Lvl                137 mmol/L                             Potassium Lvl             4.8 mmol/L                             Chloride                  106 mmol/L                             CO2                       22 mmol/L  LOW                             Calcium Lvl               8.9 mg/dL                             Magnesium Lvl             1.75 mg/dL                             Glucose Lvl               131 mg/dL  HI                             BUN                       18.0  mg/dL                             Creatinine                1.14 mg/dL                             eGFR-AA                   81  NA                             eGFR-MARY                  67  NA                             Bili Total                0.9 mg/dL                             Bili Direct               0.3 mg/dL                             Bili Indirect             0.60 mg/dL                             AST                       30 unit/L                             ALT                       35 unit/L                             Alk Phos                  112 unit/L                             Total Protein             6.2 gm/dL                             Albumin Lvl               3.3 gm/dL  LOW                             Globulin                  2.9 gm/dL                             A/G Ratio                 1.1 ratio                             Total CK                  47 U/L                             CK MB                     2.0 ng/mL                             WBC                       10.2 x10(3)/mcL                             RBC                       4.65 x10(6)/mcL  LOW                             Hgb                       12.7 gm/dL  LOW                             Hct                       40.9 %  LOW                             Platelet                  307 x10(3)/mcL                             MCV                       88.0 fL                             MCH                       27.3 pg                             MCHC                      31.1 gm/dL  LOW                             RDW                       16.2 %                             MPV                       9.5 fL                             Abs Neut                  8.24 x10(3)/mcL                             Neutro Auto               81 %  NA                             Lymph Auto                9 %  NA                             Mono Auto                 6 %  NA                             Eos Auto                  3 %  NA                              Abs Eos                   0.3 x10(3)/mcL                             Basophil Auto             1 %  NA                             Abs Neutro                8.24 x10(3)/mcL                             Abs Lymph                 0.9 x10(3)/mcL                             Abs Mono                  0.6 x10(3)/mcL                             Abs Baso                  0.1 x10(3)/mcL    4/1/2020 10:55 CDT       Sample ABG                arterial                             Treatment                 nc@2l                             Site                      Radial Rt                             pH Art                    7.410                             pCO2 Art                  34.0 mmHg  LOW                             pO2 Art                   153.0 mmHg  HI                             HCO3 Art                  21.6 mmol/L  LOW                             CO2 Totl Art              22.6 mmol/L                             O2 Sat Art                99.4 %  HI                             D base                    -2.4  LOW                             THB ABG                   13.7 gm/dL                             CO Hgb                    1.9 %  NA                             Met Hgb Art               1.2 %                             O2 Hgb                    96.3 %                             CaO2                      18.8 mL/dL                             Ionized Calcium           1.27 mmol/L  HI                             Sodium RT                 135.0 mmol/L  LOW                             Potassium RT              4.50 mmol/L                             Allens                    N/A                             Setting 1 ABG             nc@2l    .   Radiology results:  Rad Results (ST)  < 12 hrs   Accession: [id number]  Order: CT Thorax W/O Contrast  Report Dt/Tm: 04/01/2020 12:52  Report:         CLINICAL:  Dyspnea     TECHNIQUE:  Multidetector axial images were performed from  thoracic  inlet to below hemidiaphragms without intravenous contrast  administration.  Sagittal and coronal reformations performed.     Dose length product of 476 mGycm. Automated exposure control was  utilized to minimize radiation dose.     COMPARISON: CT chest November 13, 2019. Chest radiograph April 1, 2020     FINDINGS:  Cardiac chambers enlargement is stable. There is  pericardial effusion which is mostly confined onto the right side with  maximum thickness of 2.5 cm. Bilateral lungs are remarkable for  moderate groundglass attenuations which are nonspecific though in the  setting of cardiomegaly congestive heart failure is favored. There are  bilateral large pleural effusions with 9.2 cm  depth on the right and 6.0 cm depth on the left. These cause  atelectasis of the lower lung zones. Right lower lung lobe 9 mm nodule  on image 36 series 4 remain stable.     There are new enlarged mediastinal lymph nodes. For example  aorticopulmonary window tiera complex measures 2.6 x 2.0 cm on image  21 and aorticopulmonary window 1.5 x 1.8 cm on image 20 series 3.  Calcified plaques of the thoracic aorta. Maximum diameter of the  ascending aorta is 3.4 cm.     Adrenals are not enlarged. There are degenerative changes of the  thoracic spine. No acute or aggressive skeletal abnormality.     IMPRESSION:      1.  Bilateral lungs groundglass opacities likely represent pulmonary  edema in the setting of cardiomegaly.  2.  Pericardial effusion and bilateral pleural effusions.  3.  Nonspecific enlarged mediastinal lymph nodes.    Accession: [id number]  Order: XR Chest 1 View  Report Dt/Tm: 04/01/2020 11:24  Report:   one view of the chest     CPT [id number]     HISTORY:  Dyspnea     FINDINGS:  Examination reveals mediastinal and cardiac silhouettes to be within  normal limits. Increased interstitial markings identified with  confluent airspace opacities in the left perihilar region and right  base early infiltrates cannot be  completely excluded no other focal  consolidative changes identified     IMPRESSION: Increase in interstitial markings throughout with  confluent airspace opacities in the left perihilar region and left  base and in the right base early infiltrates cannot be completely  excluded      .      Reexamination/ Reevaluation   Patient's workup consistent with CHF/pulmonary edema which is consistent with his history as well as his presentation.  We'll start diuresing.  When he is not on oxygen, he desats into the low 90s.  The patient states he has home oxygen, but despite that he was still extremity short of breath and cannot lie flat at home.  We'll consult [facility] for admission.    [facility] has seen and evaluated the patient, and is almost certain that the patient is probably a CHF exacerbation, but with multiple documented atypical presentations better coronavirus positive, asked if a medicine admission can be obtained with coronal by her testing.  If the patient is ruled out, then certainly they can assume care to manage the heart failure which they will be following for regardless           Impression and Plan   Diagnosis   Acute exacerbation of congestive heart failure ([id number] I50.9)      Calls-Consults   -  Red OSBORNE, Gray HINDS   Plan   Condition: Stable.    Disposition: Admit time  4/1/2020 13:50:00, Place in Observation Telemetry Unit, Discussed with Goyo.    Counseled: Patient, Regarding diagnosis, Regarding diagnostic results, Regarding treatment plan.

## 2022-05-02 NOTE — HISTORICAL [facility]
This is a historical note converted from [place]. Formatting and pictures may have been removed.  Please reference [place] for original formatting and attached multimedia. Chief Complaint  establish care  History of Present Illness  Patient is accompanied by wife at bedside.  Patient has no acute complaints today. ?Blood pressure elevated today, may be component of whitecoat hypertension. ?Sometimes patients?systolic?drops down to 95?and sometimes remains above elevated around 116. ?Recommended bring blood pressure log to next visit?before readjusting?antihypertensives.  Patient has?right carpal tunnel release scheduled with Dr. Esparza?next month  Patients only request today is to refill sertraline, atorvastatin, trazodone  Because patient has received flu vaccine 3 days ago prior to todays visit, will hold off?Covid booster shot today, will provide next?visit?in 1 month from now  ?  Rest of management?as below?including past medical history  Review of Systems  Denies chest pain,headaches, visual changes, n/v/d, abd pain, peripheral edema, dec in muscle strength, weight loss, night sweats.  Physical Exam  Vitals & Measurements  T:?36.7? ?C (Oral)? HR:?60(Peripheral)? RR:?18? BP:?148/72?  HT:?172.00?cm? WT:?74.900?kg? BMI:?25.32?  General:?well-developed well-nourished in no acute distress  HENT:?normocephalic, atraumatic  Respiratory:?clear to auscultation bilaterally  Cardiovascular:?regular rate and rhythm without murmurs, gallops or rubs;?1+ peripheral edema  Gastrointestinal:?soft, non-tender, non-distended with normal bowel sounds, without masses to palpation  Musculoskeletal:?full range of motion of all extremities/spine without limitation or discomfort  Neurologic:? no signs of peripheral neurological deficit, motor/sensory function intact  ?  Assessment/Plan  Subclavian steal syndrome, left s/p left subclavian arterial stent in Feb 2021  Carotid Artery Stenosis, s/p R carotid artery stent placed by Dr. Cm  1/29/21  ?-US NIVA done in Feb 2020 Which showed findings suggestive of moderate to moderately severe left arm arterial insufficiency with some degree of obstruction at the level of the proximal subclavian artery.  ?-Continue aspirin and Plavix  ?  Critical Aortic stenosis/regurgitation s/p?TAVR?on 4/29/20  ?-continue follow up by Dr. Lin and Dr. Mares  ?  Carpal?Tunnel s/p release in left hand  ?-Continue gabapentin 900 mg twice daily  - pending right carpal?tunnel release by Dr. Guy Esparza?in Nov 2021  ?  HTN  ?-Currently taking Coreg 25mg BID. Lisinopril 40mg daily, Amlodipine 10mg daily, blood pressure today 148/72 but patient tells me he has white coat htn, sometimes pressures at home range from 96 to 160  -?instructed to bring blood pressure log next?visit,?will add antihypertensives as needed  ?  CKD?Stage IIIA  ?-order BREE, ANCA, CMP, microalbumin, UPCR  ?-RP ultrasound December 2020: Multiple simple appearing bilateral renal cortical cysts and parenchymal echogenicity of kidneys bilaterally  - will order repeat CMP today and if CKD 3 will send referral to Nephro clinic  ?-Avoid nephrotoxic agents  ?  HLD  ?-Continue atorvastatin 80, Zetia 10  - order repeat FLP  ?  CAD/ PAD s/p fem pop bypass  ?Superior mesenteric artery stenosis  ?-continue follow up with cardiology, surgery clinic recommended no surgical intervention at this time  ?-continue asa, statin  ?  Arthritis  ?-continue tylenol arthritis PRN  ?-Continue follow-up with Ortho  ?  Depressed mood vs. Major Depressive Disorder  ?-Continue Sertraline 50mg daily  ?  Pre-DM  ?-Last A1c 5.8?in Jan 2020,?reorder A1c today  ?-Eye exam in October 2019 showed no retinopathy, but +cataracts. Will be followed by ophtho  ?  ?Insomnia  - refilled Trazodone 100 mg  ?  ?need refills for Sertraline 50, Atorva 80, Trazo 100  ?  Health Maintenance/ Wellness  Flu vaccine:?received 10/22/2021  ?Pneumococcal vaccine: UTD, has had prevnar and pneumovax  COVID  vaccine: completed recent dose in 2/10/2021, eligible for booster Pfizer shot next visit, will hold off today as patient just received flu shot 3 days ago  TDAP: UTD, done in 2014  Influenza vaccine: UTD  Shingrix vaccine: UTD  AAA U/S screening:?order today  Screening colonoscopy:?reorder FIT today. Colonoscopy states he had in the past many years ago  Lung Cancer Screening: CT last done in Sept 2020, reorder LDCT today  Hepatitis Panel: Negative in 2015  ?  Referrals  Clinic Follow up, *Est. 11/23/21 7:30:00 CST, Order for future visit, Stenosis of right carotid artery, [facility]  Referral to Lung Cancer Screening, aggressive ex smoker, Aggressive ex-smoker  ?  ?  Attending Physician Addendum  Management and plan discussed with resident at time of clinic visit. Care was reasonable and  necessary. Routine follow up.   Problem List/Past Medical History  Ongoing  Aortic stenosis, non-rheumatic  Aortic valve disorder  Atherosclerosis  CAD - Coronary artery disease  Cardiomyopathy  Carotid artery stenosis  Carpal tunnel syndrome  Chest pain(  Confirmed  )  CKD - chronic kidney disease  Congestive heart failure(  Confirmed  )  Constipation  HLD (hyperlipidemia)(  Confirmed  )  HTN (hypertension)(  Confirmed  )  Insomnia  Joint pain  Neuropathy  Pulmonary nodule  PVD (peripheral vascular disease)(  Confirmed  )  MODESTA - Renal artery stenosis  SOB - Shortness of breath  Subclavian artery stenosis  Well adult exam  Historical  Aortic stenosis, Mild  Cardiac ejection fraction 61%  Dyslipidemia  HTN (hypertension)  PVD (peripheral vascular disease)  Procedure/Surgical History  Dilation of Right Common Carotid Artery with Intraluminal Device, Percutaneous Approach (01/29/2021)  Fluoroscopy of Cervico-Cerebral Arch using Low Osmolar Contrast (01/29/2021)  Fluoroscopy of Left Internal Carotid Artery using Low Osmolar Contrast (01/29/2021)  Fluoroscopy of Right Common Carotid Artery using Low Osmolar Contrast  (01/29/2021)  Magnetic resonance (eg, proton) imaging, any joint of upper extremity; without contrast material(s) (01/14/2021)  Magnetic resonance (eg, proton) imaging, any joint of upper extremity; without contrast material(s) (01/12/2021)  Cyanocobalamin (Vitamin B-12); (11/18/2020)  Thyroid stimulating hormone (TSH) (11/18/2020)  Nerve conduction studies; 9-10 studies (10/20/2020)  Chest Tube Insertion (04/29/2020)  Drainage of Right Pleural Cavity with Drainage Device, Percutaneous Approach (04/29/2020)  Fluoroscopy of Right Lower Extremity Arteries using Low Osmolar Contrast (04/29/2020)  Fluoroscopy of Thoracic Aorta using Low Osmolar Contrast (04/29/2020)  Peer Reviewed (.) (04/29/2020)  Replacement of Aortic Valve using Zooplastic Tissue, Rapid Deployment Technique, Percutaneous Approach, [employer] 2 (04/29/2020)  TAVR-TF (.) (04/29/2020)  Ultrasonography of Left Heart, Transesophageal (04/29/2020)  Automatic Implantable Cardiac Defibrillator (AICD) Implant (04/23/2020)  Insertion of Defibrillator Generator into Chest Subcutaneous Tissue and Fascia, Open Approach (04/23/2020)  Insertion of Defibrillator Lead into Right Atrium, Percutaneous Approach (04/23/2020)  Insertion of Defibrillator Lead into Right Ventricle, Percutaneous Approach (04/23/2020)  Insertion or replacement of permanent implantable defibrillator system, with transvenous lead(s), single or dual chamber (04/23/2020)  Fluoroscopy of Multiple Coronary Arteries using Other Contrast (01/15/2020)  Left Heart Catheterization: Diagnostic Only (01/15/2020)  Measurement of Cardiac Sampling and Pressure, Left Heart, Percutaneous Approach (01/15/2020)  Aorta-Superior Mesenteric Artery Bypass  Carotid Endarterectomy (CEA), Left  Femoral-Popliteal Artery Bypass  Renal Artery Angiogram: Stent to Renal Artery  Tonsillectomy   Medications  amLODIPine 10 mg oral tablet, 10 mg= 1 tab(s), Oral, At Bedtime  aspirin 81 mg oral tablet, CHEWABLE, 81 mg=  1 tab(s), Oral, Daily  atorvastatin 80 mg oral tablet, 80 mg= 1 tab(s), Oral, Daily, 2 refills  carvedilol 25 mg oral tablet, 25 mg= 1 tab(s), Oral, BID  DuoNeb 0.5 mg-2.5 mg/3 mL inhalation solution, 3 mL, INH, QID, PRN, 3 refills  ezetimibe 10 mg oral tablet, 10 mg= 1 tab(s), Oral, At Bedtime  Flonase 50 mcg/inh nasal spray, 1 spray(s), Nasal, BID, PRN, 3 refills,? ?Not Taking, Completed Rx: Last Dose Date/Time Unknown  gabapentin 300 mg oral capsule, 300 mg= 1 cap(s), Oral, BID, PRN  Lasix 40 mg oral tablet, 40 mg= 1 tab(s), Oral, Daily, 3 refills  lisinopril 40 mg oral tablet, 40 mg= 1 tab(s), Oral, BID  multivitamin with minerals (Adult Tab), 1 tab(s), Oral, Daily  nitroglycerin 0.4 mg sublingual TAB, 0.4 mg= 1 tab(s), SL, q5min, PRN  omeprazole 40 mg oral DR capsule, 40 mg= 1 cap(s), Oral, Daily,? ?Not Taking per Prescriber: Last Dose Date/Time unknown  Osteo Bi-Flex 250 mg-200 mg oral tablet, 1 tab, Oral, Daily  Plavix 75 mg oral tablet, 75 mg= 1 tab(s), Oral, Daily, 11 refills  sertraline 50 mg oral tablet, 50 mg= 1 tab(s), Oral, Daily, 3 refills  traZODone 100 mg oral tablet, 100 mg= 1 tab(s), Oral, Once a day (at bedtime), 3 refills  Triple Antibiotic Ointment topical, 1 dose(s), TOP, Once  Vitamin D3 5000 intl units (125 mcg) oral capsule, 5000 IntUnit= 1 cap(s), Oral, Daily  Allergies  penicillin G benzathine?(Rash)  Social History  Abuse/Neglect  No, No, Yes, 02/03/2021  Alcohol - Denies Alcohol Use, 09/11/2014  Past, 02/03/2021  Employment/School  Retired, Highest education level: High school., 11/10/2020  Exercise - Does not exercise, 03/06/2015  Exercise duration: 60. Exercise frequency: 3-4 times/week. Exercise type: Weight lifting., 11/10/2020  Home/Environment  Lives with Spouse. Living situation: Home/Independent. Respiratory treatments, Single family house, 11/10/2020  Nutrition/Health  Regular, Good, 11/10/2020  Sexual  Sexual orientation: Straight or heterosexual. Gender Identity Identifies as  male., 01/30/2019  Spiritual/Cultural  Christian, Yes, 04/28/2020  Substance Use - Denies Substance Abuse, 09/11/2014  Never, Drug use interferes with work/home: No. Household substance abuse concerns: No., 02/03/2021  Tobacco - Denies Tobacco Use, 09/11/2014  Former smoker, quit more than 30 days ago, N/A, Household tobacco concerns: No., 02/03/2021  Family History  CVA (cerebral infarction): Father.  Parkinson disease.: Mother.  Immunizations  Vaccine Date Status Comments   influenza virus vaccine, inactivated 10/22/2021 Recorded    COVID-19 MRNA, LNP-S, PF- Pfizer 02/10/2021 Given    COVID-19 MRNA, LNP-S, PF- Pfizer 01/20/2021 Given    zoster vaccine, inactivated 11/10/2020 Given    influenza virus vaccine, inactivated 09/23/2020 Recorded    zoster vaccine, inactivated 01/07/2020 Given    influenza virus vaccine, inactivated 10/23/2019 Recorded    pneumococcal 13-valent conjugate vaccine 06/10/2019 Given Pt raquel well. No adverse reaction noted at d/c. Consent signed and VIS given.   influenza virus vaccine, inactivated 10/10/2018 Recorded    influenza virus vaccine, inactivated 09/27/2017 Recorded [facility]   pneumococcal 23-polyvalent vaccine 02/15/2017 Given    influenza virus vaccine, inactivated 10/12/2016 Recorded    influenza virus vaccine, inactivated 10/14/2015 Recorded    influenza virus vaccine, inactivated 10/19/2014 Recorded    tetanus/diphtheria/pertussis, acel(Tdap) 09/11/2014 Given other (see comment)   influenza virus vaccine, inactivated 10/20/2013 Recorded    influenza virus vaccine, inactivated 10/20/2013 Recorded    influenza virus vaccine, inactivated 09/25/2013 Recorded    influenza virus vaccine, inactivated 12/14/2012 Recorded    Health Maintenance  Health Maintenance  ???Pending?(in the next year)  ??? ??OverDue  ??? ? ? ?Hypertension Management-Education due??08/16/18??and every 1??year(s)  ??? ? ? ?HF-LVEF due??04/30/21??and every 1??year(s)  ??? ??Due?  ??? ? ? ?HF-Ejection  Fraction Past 13 Months if Hospitalized due??10/26/21??and every 1??year(s)  ??? ? ? ?HF-Fluid Restriction/Low Sodium Diet Education due??10/26/21??and every 1??year(s)  ??? ? ? ?Medicare Annual Wellness Exam due??10/26/21??and every 1??year(s)  ??? ??Due In Future?  ??? ? ? ?HF-ACEI/ARB Prescribed if Clinically Indicated not due until??11/10/21??and every 1??year(s)  ??? ? ? ?Obesity Screening not due until??01/01/22??and every 1??year(s)  ??? ? ? ?Advance Directive not due until??01/02/22??and every 1??year(s)  ??? ? ? ?Cognitive Screening not due until??01/02/22??and every 1??year(s)  ??? ? ? ?Fall Risk Assessment not due until??01/02/22??and every 1??year(s)  ??? ? ? ?Functional Assessment not due until??01/02/22??and every 1??year(s)  ??? ? ? ?Coronary Artery Disease Maintenance-Electrocardiogram not due until??01/29/22??and every 1??year(s)  ??? ? ? ?Aspirin Therapy for CVD Prevention not due until??01/30/22??and every 1??year(s)  ???Satisfied?(in the past 1 year)  ??? ??Satisfied?  ??? ? ? ?ADL Screening on??10/26/21.??Satisfied by Viktoria Hassan LPN  ??? ? ? ?Advance Directive on??01/29/21.??Satisfied by Yun ALBA, Renee CHOE  ??? ? ? ?Aspirin Therapy for CVD Prevention on??01/30/21.??Satisfied by Chely San  ??? ? ? ?Blood Pressure Screening on??10/26/21.??Satisfied by Sonya MEDRANO Viktoria  ??? ? ? ?Body Mass Index Check on??10/26/21.??Satisfied by Viktoria Hassan LPN  ??? ? ? ?Cognitive Screening on??10/26/21.??Satisfied by Viktoria Hassan LPN  ??? ? ? ?Coronary Artery Disease Maintenance-BMP on??10/26/21.??Satisfied by Rula Hogan  ??? ? ? ?Depression Screening on??10/26/21.??Satisfied by Viktoria Hassan LPN  ??? ? ? ?Diabetes Maintenance-Fasting Lipid Profile on??10/26/21.??Satisfied by Rula Hogan  ??? ? ? ?Diabetes Screening on??10/26/21.??Satisfied by Rula Hogan  ??? ? ? ?Fall Risk Assessment on??02/03/21.??Satisfied by Galina No  ??? ? ?  ?Functional Assessment on??01/29/21.??Satisfied by Yun ALBA, Renee HARRINGTON.  ??? ? ? ?HF-ACEI/ARB Prescribed if Clinically Indicated on??11/10/20.  ??? ? ? ?HF-Beta Blocker Prescribed if Clinically Indicated on??01/29/21.  ??? ? ? ?Hypertension Management-BMP on??10/26/21.??Satisfied by Rula Hogan  ??? ? ? ?Influenza Vaccine on??10/22/21.??Satisfied by Viktoria Hassan LPN  ??? ? ? ?Lipid Screening on??10/26/21.??Satisfied by Rula Hogan  ??? ? ? ?Obesity Screening on??10/26/21.??Satisfied by Viktoria Hassan LPN  ??? ? ? ?Zoster Vaccine on??11/10/20.??Satisfied by Jennifer ALBA, Lexy  ?

## 2022-05-02 NOTE — HISTORICAL [facility]
This is a historical note converted from [place]. Formatting and pictures may have been removed.  Please reference [place] for original formatting and attached multimedia. Chief Complaint  here for 3 month f/u, denies any complaints at present  History of Present Illness  72 yo male with past medical?history of ?HTN, Aortic stenosis, HLD, PAD s/p femoral popliteal bypass (years ago), and?CAD?presents for?ongoing cardiovascular care.? He had a recent carotid artery ultrasound?and ultrasound of abdominal aorta ordered by primary care, the carotid ultrasound revealed less then 50% stenosis bilaterally however the ultrasound of the aorta revealed?elevated SMA velocity suggestive of greater than 70% stenosis.? Previous aortofemoral bypass graft without evidence of dilatation or fluid collection.?  ?   He denies any exertional chest pain, shortness of breath, edema, orthopnea, or noticed activity limitations. ?Reports he is feeling well. ?He admits his blood pressure has been running a little bit higher and he admits he is taking his medications as prescribed.  ?   Echocardiogram April 5, 2017:  LVEF measured at approximately 57%  LV size is normal  E/A flow reversal noted, suggestive of diastolic dysfunction  Right ventricular size with preserved RV function  2+ aortic regurgitation present?  mild aortic stenosis, mean gradient of 16 mm Hg  normal mitral valve  Mitral annular calcification is present  no evidence of any pericardial?effusion  ?   US Abd/aorta 2/2017:  IMPRESSION:  Normal resting AKANKSHA  Aortobiiliac bypass graft without evidence of dilatation or fluid  collection  No elevation in the anastomotic location  Elevated SMA velocity suggestive of greater than 70% stenosis.  ?  ?  Carotid US 2/2017:  IMPRESSION:  Right internal carotid artery stenosis less than 50%  Left internal carotid artery stenosis less than 50% no evidence of  recurrent or residual disease in the endarterectomy site.  Study is similar to the  previous study of 2016  ?  ?  Echocardiogram February 1, 2016:  Normal left ventricular cavity with overall normal systolic function, estimated at 68%  Mild MR  Mild to moderate aortic stenosis is present  Aortic valve?area-1.1 cm?, mean gradient-19.06 mm Hg  Aortic regurgitation is present  Tricuspid regurgitation  Mildly dilated left atrium  Normal right atrial dimension with no evidence of mass or thrombus noted  Normal right ventricular size with preserved RV function  ?   [facility] 12/2015  CONCLUSIONS:  1.??Left anterior descending artery with 30-40% midvessel stenosis adjacent to the first diagonal with moderate atherosclerotic plaque present.  2.??Circumflex chronically occluded in its midsection with collateral flow from the right and left sides.  3.??Large first obtuse marginal also supplying circumflex distribution without significant stenosis.  4.??Moderate-size but dominant right coronary artery supplying the posterior descending artery and posterolateral segment branches with mild diffuse atherosclerotic plaque present but no significant stenosis.  5.??Normal left ventricular cavity size and contractility. Left ejection fraction greater than 55%.  6.??Mild aortic valve gradient, 12 mmHg.  7.??Normal left ventricular end diastolic pressure.  8.??Rest of the findings as described above.  Review of Systems  Constitutional: negative for fever,chills, sweats, weakness, fatigue, decreased activity?????  Eye: negative  ENMT: negative  Respiratory: negative?  Cardiovascular: negative?except HPI  Gastrointestinal: negative?for nausea, vomiting, abdominal pain, constipation, diarrhea  Genitourinary: negative  Hema/Lymph: negative?for bruising/bleeding tendency, negative for swollen lymph glands  Endocrine: negative  Immunologic: negative  Musculoskeletal: negative  Integumentary: negative  Neurologic: negative  Psychiatric: negative  All Other ROS: negative  Physical Exam  Vitals & Measurements  T:?36.5? ?C ?(Oral)?  RR:?16? BP:?149/66? SpO2:?98%? WT:?75.7?kg? WT:?75.7?kg?  HR-56  General: alert and oriented/no acute distress  Eye: EOMI/normal conjunctiva/vision unchanged  HENT: normocephalic/normal hearing/moist oral mucosa  Neck: supple/nontender/no carotid bruit/no JVD  Respiratory: lungs CTA/nonlabored respirations/BS equal/symmetrical expansion/no chest wall tenderness  Cardiovascular: normal rate/normal rhythm/3/6 systolic?murmur/no gallop/normal peripheral perfusion/no edema  Gastrointestinal: soft/nontender/nondistended/normal bowel sounds  Lymphatics: no lymphadenopathy  Musculoskeletal: normal ROM/normal strength  Integumentary: warm/dry/pink/intact  Neurologic: alert/oriented/normal sensory/no focal deficits  Psychiatric: cooperative/appropriate mood and affect/normal judgment  Assessment/Plan  1. HTN  -Not controlled at goal  -Increase amlodipine to 10 mg by mouth daily  -Continue carvedilol 25 mg by mouth twice a day?and lisinopril 10 mg by mouth twice a day  -Monitor blood pressure at home in log and record  ?   2. ?Aortic stenosis/moderate aortic regurgitation  Updated echo with mean gradient?at 16 mm Hg, mild aortic stenosis, normal LVEF, 2+ AR  Continue current medical management and counseled upon importance of good blood pressure control  Continue atorvastatin 80 mg by mouth daily  Repeat echocardiogram in one year  ?   3. ?CAD  Last left heart cath?December 2015?with chronic total occlusions but with good collateral flow:  [facility] 12/2015  CONCLUSIONS:  1.??Left anterior descending artery with 30-40% midvessel stenosis adjacent to the first diagonal with moderate atherosclerotic plaque present.  2.??Circumflex chronically occluded in its midsection with collateral flow from the right and left sides.  3.??Large first obtuse marginal also supplying circumflex distribution without significant stenosis.  4.??Moderate-size but dominant right coronary artery supplying the posterior descending artery and posterolateral  segment branches with mild diffuse atherosclerotic plaque present but no significant stenosis.  5.??Normal left ventricular cavity size and contractility. Left ejection fraction greater than 55%.  6.??Mild aortic valve gradient, 12 mmHg.  7.??Normal left ventricular end diastolic pressure.  ?   continue good medical management with aspirin, atorvastatin, carvedilol, lisinopril  Counseled on heart healthy diet and non-sedentary lifestyle,?continue to be a nonsmoker  ?   4. ?Hypercholesterolemia  Continue atorvastatin 80 mg by mouth daily  Counseled on heart healthy, ADA diet compliance and healthy BMI  ?  5. 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 Luna to further evaluate  ?  Follow-up in cardiology clinic in 3 months  Referral to vascular surgery for?SMA stenosis  Continue follow-up with PCP  ?   Problem List/Past Medical History  Aortic stenosis, severe(  Confirmed  )  Aortic valve disorder  Chest pain(  Confirmed  )  Congestive heart failure(  Confirmed  )  Constipation  HLD (hyperlipidemia)(  Confirmed  )  HTN (hypertension)(  Confirmed  )  Insomnia  PVD (peripheral vascular disease)(  Confirmed  )  Historical  No historical problems  Procedure/Surgical History  Femoral-popliteal artery bypass graft with vein (01/01/2010), Aorta to superior mesenteric artery bypass graft (01/01/2009), Fluoroscopy and stent renal artery (01/01/2008), CEA - Carotid endarterectomy (01/01/2006).  Medications  aspirin 325 mg oral tablet, 325 mg, 1 tab(s), Oral, Daily  atorvastatin 80 mg oral tablet, 80 mg, 1 tab(s), Oral, Daily, 3 refills  carvedilol 25 mg oral tablet, 25 mg, 1 tab(s), Oral, BID, 3 refills  lactulose 10 g/15 mL oral syrup, 20 gm, 30 mL, Oral, TID, 3 refills  lisinopril 10 mg oral tablet, 20 mg, 2 tab(s), Oral, Daily, 3 refills  meloxicam 7.5 mg oral tablet, 7.5 mg, 1 tab(s), Oral, BID, 3 refills  Nitrostat 0.4 mg sublingual TAB, 0.4 mg, 1  tab(s), SL, q5min, PRN  Norvasc 5 mg oral tablet, 5 mg, 1 tab(s), Oral, Daily, 3 refills  Triple Antibiotic Ointment topical, 1 dose(s), TOP, Once  Viagra 50 mg oral tablet, 50 mg, 1 tab(s), Oral, Daily  Allergies  penicillin G benzathine  Social History  Alcohol - Denies Alcohol Use  Past  Employment/School  Retired, Highest education level: High school.  Exercise - Does not exercise  Exercise frequency: 3-4 times/week. Self assessment: Fair condition. Exercise type: Walking, Weight lifting.  Home/Environment  Lives with Spouse. Living situation: Home/Independent. Alcohol abuse in household: No. Substance abuse in household: No. Smoker in household: Yes. Feels unsafe at home: No. Safe place to go: Yes. Family/Friends available for support: Yes.  Nutrition/Health  Regular, Caffeine intake amount: 2 coke zero per day.  Substance Abuse - Denies Substance Abuse  Never  Tobacco - Denies Tobacco Use  Former smoker  Family History  CVA (cerebral infarction): Father.  Parkinson disease.: Mother.     [1]?Cardiology Office Visit Note; Thuan Dominguez 02/15/2017 09:58 CST  [2]?Cardiology Office Visit Note; Thuan Dominguez 02/15/2017 09:58 CST

## 2022-05-02 NOTE — HISTORICAL [facility]ER
This is a historical note converted from [place]. Formatting and pictures may have been removed.  Please reference [place] for original formatting and attached multimedia. Procedure Name  Date/Time:1/20/2020 11:58:58  Procedure:??Bilateral?carpal tunnel  Indications:??Diagnostic and Therapeutic Indication - decrease pain, increase range of motion and improve quality of life  RISKS: Possible complications with injection include?bleeding, infection (0.01%), tendon rupture, steroid flare, fat pad or soft tissue atrophy, skin depigmentation, and vasovagal response. ?(Steroid flair treatment is rest, ice, NSAIDs and resolves in 24-36 hours.)  Consent:???Procedure, risks, benefits, & alternatives explained to patient, who voiced understanding and agreed to proceed with procedure. ?Consent signed and?scanned into the medical record. No absolute contraindications (cellulitis overlying joint, infection, lack of informed consent, allergy to injection mediation, cristy protein or egg allergy for sodium hyaluronate, or history of steroid flare) or relative contraindications (brittle or out of control DM HgA1c > 10, coagulopathy INR > 3.5, previous joint replacement, or history of AVN).  Description:?Time out performed. The patient was prepped?using Chlorhexidine scrub after the appropriate?identification of anatomic landmarks.? Sterile needle used (size # 21 gauge, length 1.5 inch)?Topical anesthetic of ethyl chloride was used.? ?40 mg of Kenalog injected  Complications:?None?  EBL:??None  Post Procedure:?Patient reports improvement in pain and ROM. Patient alert, moving all extremities. ?Good peripheral pulses, no signs of vascular compromise, range of motion intact. ?Patient tolerated procedure well. ?Aftercare instructions were given to patient at time of discharge.??Relative rest for 3 days - avoiding excessive activity. ?Pendulum stretching exercises followed by stretching exercises daily?starting on day 4.? Place ice on area  for 15 minutes every 4 to 6 hours.??Tylenol 1000mg twice a day or ibuprofen 600 mg three times per day for next 3-4 days if not on medication already. ?Protect the area for the next 1-8 hours if anesthetic was used. ?Avoid excessive activity for next 3 to 4 weeks.?ER precautions for fever, severe joint pain, or allergic reaction or other new symptoms related to joint injection.

## 2022-05-02 NOTE — HISTORICAL [facility]
This is a historical note converted from [place]. Formatting and pictures may have been removed.  Please reference [place] for original formatting and attached multimedia. Procedure Name  Date/Time:3/15/2018 11:20:30  Procedure:??Bilateral?Carpal Tunnel?  Indications:??Diagnostic and Therapeutic Indication - decrease pain, increase range of motion and improve quality of life  RISKS: Possible complications with injection include?bleeding, infection (0.01%), tendon rupture, steroid flare, fat pad or soft tissue atrophy, skin depigmentation, and vasovagal response. ?(Steroid flair treatment is rest, ice, NSAIDs and resolves in 24-36 hours.)  Consent:???Procedure, risks, benefits, & alternatives explained to patient, who voiced understanding and agreed to proceed with procedure. ?Consent signed and?scanned into the medical record. No absolute contraindications (cellulitis overlying joint, infection, lack of informed consent, allergy to injection mediation, cristy protein or egg allergy for sodium hyaluronate, or history of steroid flare) or relative contraindications (brittle or out of control DM HgA1c > 10, coagulopathy INR > 3.5, previous joint replacement, or history of AVN).  Description:?Time out performed. The patient was prepped?using Chlorhexidine scrub after the appropriate?identification of anatomic landmarks.? Sterile needle used (size # 21 gauge, length 1.5 inch)?Topical anesthetic of ethyl chloride was used.???1 mL of 1% lidocaine plain with 40 mg of Kenalog injected.  Complications:?None?  EBL:??None  Post Procedure:?Patient reports improvement in pain and ROM. Patient alert, moving all extremities. ?Good peripheral pulses, no signs of vascular compromise, range of motion intact. ?Patient tolerated procedure well. ?Aftercare instructions were given to patient at time of discharge.??Relative rest for 3 days - avoiding excessive activity. ?Pendulum stretching exercises followed by stretching exercises  daily?starting on day 4.? Place ice on area for 15 minutes every 4 to 6 hours.??Tylenol 1000mg twice a day or ibuprofen 600 mg three times per day for next 3-4 days if not on medication already. ?Protect the area for the next 1-8 hours if anesthetic was used. ?Avoid excessive activity for next 3 to 4 weeks.?ER precautions for fever, severe joint pain, or allergic reaction or other new symptoms related to joint injection.

## 2022-05-02 NOTE — HISTORICAL [facility]
This is a historical note converted from [place]. Formatting and pictures may have been removed.  Please reference [place] for original formatting and attached multimedia. Admit and Discharge Dates  Admit Date: 04/01/2020  Discharge Date: 04/03/2020  Physicians  Attending Physician - Dominic OSBORNE, Nancie  Admitting Physician - Dominic OSBORNE, Nancie  Consulting Physician - Red OSBORNE, Gray JOHNSON  Primary Care Physician - [facility] OSBORNE, Shiloh  Discharge Diagnosis  Acutely decompensated HFrEF 10-15%  Severe aortic stenosis  Pulmonary edema  Bilateral pleural effusion and small pericardial effusion  Non-STEMI type II  Peripheral vascular disease  Essential hypertension  Hyperlipidemia  Moderate?COPD without exacerbation  History of CAD  Subclavian steal syndrome  Surgical Procedures  No procedures recorded for this visit.  Immunizations  No immunizations recorded for this visit.  Admission Information  Refer to progress note from day of discharge  Hospital Course  Update to progress note  Has been evaluated?by cardiology today and recommended okay to discharge?with outpatient follow-up?with?Dr. Can?in 1 week.? Medications adjusted per cardiology recommendations.? We will follow-up outpatient. ?Doing well on room air and?ready for discharge.? Of note his COVID-19 test has not returned?and still needs to be in isolation until he has 2-?test results.  Time Spent on discharge  40 minutes  Objective  Vitals & Measurements  T:?36.6? ?C (Oral)? HR:?76(Peripheral)? RR:?18? BP:?133/68? SpO2:?96%?  Physical Exam  Refer to progress note from day of discharge  Patient Discharge Condition  Improved and stable  Discharge Disposition  Home   Discharge Medication Reconciliation  Continue  albuterol-ipratropium (DuoNeb 0.5 mg-2.5 mg/3 mL inhalation solution)?3 mL, INH, QID, PRN as needed for shortness of breath or wheezing  ascorbic acid (Vitamin C 250 mg oral tablet)?1 tab, Oral, Daily  aspirin (aspirin 81 mg oral tablet, CHEWABLE)?81 mg,  Oral, Daily  atorvastatin (atorvastatin 80 mg oral tablet)?80 mg, Oral, Daily  carvedilol (Coreg 12.5 mg oral tablet)?12.5 mg, Oral, BID  chondroitin-glucosamine (Osteo Bi-Flex 250 mg-200 mg oral tablet)?1 tab, Oral, Daily  diclofenac topical (diclofenac 1% topical gel)?2 gm, TOP, QID, PRN as needed for pain  fluticasone nasal (Flonase 50 mcg/inh nasal spray)?1 spray(s), Nasal, BID, PRN allergy symptoms  furosemide (Lasix 20 mg oral tablet)?20 mg, Oral, Daily, PRN swelling  furosemide (Lasix 40 mg oral tablet)?40 mg, Oral, Daily  gabapentin (gabapentin 300 mg oral capsule)?300 mg, Oral, At Bedtime  lisinopril (lisinopril 5 mg oral tablet)?5 mg, Oral, Daily  multivitamin with minerals (multivitamin with minerals (Adult Tab))?1 tab(s), Oral, Daily  nitroglycerin (Nitrostat 0.4 mg sublingual tab)?0.4 mg, SL, q5min, PRN for chest pain  omeprazole (omeprazole 40 mg oral DR capsule)?40 mg, Oral, Daily  sertraline (sertraline 50 mg oral tablet)?50 mg, Oral, Daily  traZODone (traZODONE 50 mg oral tablet ( Desyrel ))?50 mg, Oral, Once a day (at bedtime)  Discontinue  carvedilol (carvedilol 3.125 mg oral tablet)?3.125 mg, Oral, BID  carvedilol (carvedilol 6.25 mg oral tablet)?6.25 mg, Oral, BID  lactulose (lactulose 10 g/15 mL oral syrup)?20 gm, Oral, TID  levoFLOXacin (levofloxacin 750 mg oral tablet)?750 mg, Oral, Daily  lisinopril (lisinopril 2.5 mg oral tablet)?2.5 mg, Oral, Daily  Education and Orders Provided  Heart Failure (PMUSSO)  Discharge - 04/03/20 12:13:00 CDT, Home, Give all scheduled vaccinations prior to discharge.?  Discharge Activity - Activity as Tolerated?  Discharge Diet - Low Sodium?  Follow up  Eligio Mares, within 1 week, on  ????  Follow up in one week for TAVR work up  Shiloh [place], within 1 to 2 weeks

## 2022-05-02 NOTE — HISTORICAL [facility] CERNER
This is a historical note converted from [employer]. Formatting and pictures may have been removed.  Please reference [employer] for original formatting and attached multimedia. Chief Complaint  Pt c/o epigastric pain and sob x 3 weeks. Pt denies CP, pt states when I eat I feel like my food just stays in my chest. Pt denies difficulty swallowing/choking  History of Present Illness  ?? Patient is a 73-year-old gentleman with a past medical history significant for aortic stenosis, hypertension, hyperlipidemia, peripheral vascular disease who presents to the ED today with complaints of shortness of breath ongoing since Thursday. ?Patient states that he has had occasional episodes in the past, however over the last few days it is been getting worse.??He usually exercises at the gym and lifts weights with no issues, however noticed?progressive PAZ?and subsequently decided to get evaluated. ?Denies any chest pain, palpitations,?headaches, changes in vision, nausea/vomiting. ?Of note, he did try to use some home oxygen?last night?to help with his symptoms, but states that it did not do anything. ?He does endorse?a recent?difficulty swallowing, feels like?the food gets stuck in the middle of his throat.? Unable to differentiate differences between solids and liquids. ?Denies any pain with swallowing.? Otherwise he denies any fevers or chills?and is?in his normal state of health. ?Reports compliance with most of his medications. ?Does state that he takes his antihypertensives when he needs to and has not been taking his metformin or statin?due to weight loss.  ?? In the ED, VS significant for hypertension.? Lab work was significant for a proBNP of 3660, troponin was slightly elevated 0.063.? Chest x-ray was performed as well as CT thorax which revealed moderate effusions bilaterally.? EKG revealed signs of left ventricular hypertrophy with?T wave changes consistent with?cardiac strain.?Medicine was consulted for evaluation of  elevated BNP and troponin.  ?  Social history: Lives with his wife at home.? Previous smoker and drinker, quit in 2004. ?Denies illicit drug use.  Review of Systems  CONSTITUTIONAL: No weight loss, fever, chills, weakness or fatigue.  HEENT:?No blindness.?No hearing loss  SKIN: No rash or itching.  CARDIOVASCULAR: No chest pain, chest pressure or chest discomfort. No palpitations, +LE edema.  RESPIRATORY: +shortness of breath, dry cough  GASTROINTESTINAL: No anorexia, nausea, vomiting or diarrhea. No abdominal pain or blood.  GENITOURINARY: No burning on urination.  NEUROLOGICAL: No headache, dizziness, syncope, paralysis, ataxia, numbness or tingling in the extremities. No change in bowel or bladder control.  MUSCULOSKELETAL: No muscle, back pain, joint pain or stiffness.  HEMATOLOGIC: No anemia, bleeding or bruising.  Physical Exam  Vitals & Measurements  T:?36.7? ?C (Oral)? HR:?57(Peripheral)? RR:?19? BP:?182/78? SpO2:?96%? WT:?70.25?kg?  General: No acute distress.? Breathing on room air  Eye: Extraocular movements are intact, Normal conjunctiva. ?PERRLA  HENT: Normal hearing, Oral mucosa is moist, no pharyngeal erythema  Neck: Supple, Non-tender, +?JVD and hepatojugular reflex noted on left side  Respiratory: Crackles heard in bilateral fields, no wheezes appreciated  Cardiovascular: Regular rhythm and rate, normal S1, S2, Systolic murmur consistent with aortic stenosis heard,?unable to hear?radiation to carotids.?Normal peripheral perfusion,?2+ pitting?edema up to the ankles b/l  Gastrointestinal: Soft, Non-tender, bowel sounds present  Musculoskeletal: No tenderness, no swelling  Integumentary: Warm, Moist, No pallor  Neurologic: Alert, Oriented, No gross focal deficits, Cranial Nerves II-XII are grossly intact.  Cognition and Speech: Oriented, Speech clear and coherent, Functional cognition intact.  Psychiatric: Cooperative, Appropriate mood & affect  Assessment/Plan  1. Acute?Decompensated Heart  Failure?  2. Aortic Stenosis  3. CAD  -Does have hx of CHF, but most recent ECHO in May 2018 shows recovered EF at 61%  -Likely ischemic in etiology given known vascular disease, NYHA Class II  -Strict I&Os, daily weights and fluid restrict to 1.5L per day  -Holding beta blocker for now  -Continue aspirin, statin, added Lisinopril 20mg daily  -Initiated IV Lasix 40mg BID for now, will hold night time dose if urine output is adequate  -ECHO ordered for AM  -Will need nuclear stress test vs. [facility] (last done 2015)  -Trending cardiac enzymes and EKG q6hr x2 doses  ?   3. Dysphagia  -Etiology unclear at this moment  -Will order barium swallow if symptoms do not decrease with diuresis  ?   4. Peripheral Vascular Disease  -Ultrasound carotid and February 2019 shows bilateral carotid artery stenosis less than 50%  -Ultrasound duplex of abdominal aorta February 2019 reveals superior mesenteric artery stenosis, L superficial femoral artery stenosis, left subclavian stenosis  -Does have history of renal artery stent and aortobifemoral graft placement  ?  5. ?Hypertension, elevated  -Per patient he takes his BP medications as needed  -Continue?ACE inhibitor?as above  -Per last PCP note patient was also on Norvasc, can restart if needed  -Will re-add beta-blocker when?no longer decompensated  ?   6. ?Diabetes  -Last A1c 6.6  -On metformin at home  -Sliding scale while inpatient  ?   7. ?Hyperlipidemia  -Patient states that he has not been taking statin?at home  -Change from atorvastatin 80 mg daily to Crestor 20 mg daily  ?   8. Pulmonary Nodules  -Will need repeat CT in 6 months to evaluate stability  ?  ?   Disposition:?Admit to telemetry for observation.? Continue IV diuresis?with strict Is and Os, daily weights and fluid restriction.? Echo ordered for AM, will decide on ischemic work-up once?results?have returned.   Problem List/Past Medical History  Ongoing  Aortic stenosis, Mild  Aortic valve disorder  Chest pain(   Confirmed  )  Congestive heart failure(  Confirmed  )  Constipation  HLD (hyperlipidemia)(  Confirmed  )  HTN (hypertension)(  Confirmed  )  Insomnia  Joint pain  PVD (peripheral vascular disease)(  Confirmed  )  Well adult exam  Historical  Cardiac ejection fraction 61%  Procedure/Surgical History  Femoral-popliteal artery bypass graft with vein (01/01/2010)  Aorta to superior mesenteric artery bypass graft (01/01/2009)  Fluoroscopy and stent renal artery (01/01/2008)  CEA - Carotid endarterectomy (01/01/2006)   Medications  Inpatient  acetaminophen, 1000 mg= 2 tab(s), Oral, q6hr, PRN  aspirin 81 mg oral tablet, CHEWABLE, 81 mg= 1 tab(s), Oral, Daily  Crestor, 20 mg= 2 tab(s), Oral, Once a day (at bedtime)  labetalol, 10 mg= 2 mL, IV Push, q2hr, PRN  Lasix, 40 mg= 4 mL, IV Push, BID  lisinopril 10 mg oral tablet, 20 mg= 2 tab(s), Oral, Daily  Lovenox, 40 mg= 0.4 mL, Subcutaneous, Daily  Triple Antibiotic Ointment topical, 1 dose(s), TOP, Once  Home  atorvastatin 80 mg oral tablet, 80 mg= 1 tab(s), Oral, Daily  carvedilol 25 mg oral tablet, 25 mg= 1 tab(s), Oral, BID  Allergies  penicillin G benzathine  Social History  Alcohol - Denies Alcohol Use, 09/11/2014  Past, 08/11/2016  Employment/School  Retired, Highest education level: High school., 04/10/2015  Exercise - Does not exercise, 03/06/2015  Exercise duration: 60. Exercise frequency: 3-4 times/week. Self assessment: Good condition. Exercise type: Walking, Weight lifting., 12/11/2018  Exercise frequency: 3-4 times/week. Self assessment: Fair condition. Exercise type: Walking, Weight lifting., 02/15/2017  Home/Environment  Lives with Spouse. Living situation: Home/Independent. Alcohol abuse in household: No. Substance abuse in household: No. Smoker in household: Yes. Feels unsafe at home: No. Safe place to go: Yes. Family/Friends available for support: Yes., 04/10/2015  Nutrition/Health  Regular, Wants to lose weight: No., 12/11/2018  Regular, Caffeine  intake amount: 2 coke zero per day., 02/15/2017  Sexual  Sexual orientation: Straight or heterosexual. Gender Identity Identifies as male., 01/30/2019  Substance Abuse - Denies Substance Abuse, 09/11/2014  Never, 08/11/2016  Tobacco - Denies Tobacco Use, 09/11/2014  Former smoker, quit more than 30 days ago, N/A, 05/12/2019  Former smoker, quit more than 30 days ago, N/A, 01/30/2019  Family History  CVA (cerebral infarction): Father.  Parkinson disease.: Mother.  Immunizations  Vaccine Date Status Comments   influenza virus vaccine, inactivated 09/27/2017 Recorded [facility]   pneumococcal 23-polyvalent vaccine 02/15/2017 Given    tetanus/diphtheria/pertussis, acel(Tdap) 09/11/2014 Given other (see comment)   influenza virus vaccine, inactivated 10/20/2013 Recorded    influenza virus vaccine, inactivated 10/20/2013 Recorded    Lab Results  Labs Last 24 Hours?  ?Chemistry? Hematology/Coagulation?   Sodium Lvl: 141 mmol/L (05/12/19 08:12:00) WBC: 7.6 x10(3)/mcL (05/12/19 08:12:00)   Potassium Lvl: 4.1 mmol/L (05/12/19 08:12:00) RBC:?4.23 x10(6)/mcL?Low (05/12/19 08:12:00)   Chloride:?110 mmol/L?High (05/12/19 08:12:00) Hgb:?13 gm/dL?Low (05/12/19 08:12:00)   CO2: 27 mmol/L (05/12/19 08:12:00) Hct:?38.6 %?Low (05/12/19 08:12:00)   Calcium Lvl: 9.1 mg/dL (05/12/19 08:12:00) Platelet: 275 x10(3)/mcL (05/12/19 08:12:00)   Glucose Lvl:?133 mg/dL?High (05/12/19 08:12:00) MCV: 91.3 fL (05/12/19 08:12:00)   BUN:?22 mg/dL?High (05/12/19 08:12:00) MCH: 30.7 pg (05/12/19 08:12:00)   Creatinine: 1.1 mg/dL (05/12/19 08:12:00) MCHC: 33.7 gm/dL (05/12/19 08:12:00)   eGFR-AA:?84 mL/min?Low (05/12/19 08:12:00) RDW:?15.6 %?High (05/12/19 08:12:00)   eGFR-MARY:?70 mL/min?Low (05/12/19 08:12:00) MPV:?10.7 fL?High (05/12/19 08:12:00)   Bili Total: 0.6 mg/dL (05/12/19 08:12:00) Abs Neut: 5.45 x10(3)/mcL (05/12/19 08:12:00)   Bili Direct: 0.2 mg/dL (05/12/19 08:12:00) Neutro Auto: 71 x10(3)/mcL (05/12/19 08:12:00)   Bili Indirect:  0.4 mg/dL (05/12/19 08:12:00) Lymph Auto: 17 % (05/12/19 08:12:00)   AST: 19 unit/L (05/12/19 08:12:00) Mono Auto: 8 % (05/12/19 08:12:00)   ALT: 25 unit/L (05/12/19 08:12:00) Eos Auto: 2 (05/12/19 08:12:00)   Alk Phos: 86 unit/L (05/12/19 08:12:00) Abs Eos: 0.18 x10(3)/mcL (05/12/19 08:12:00)   Total Protein:?6.2 gm/dL?Low (05/12/19 08:12:00) Basophil Auto: 1 (05/12/19 08:12:00)   Albumin Lvl:?3 gm/dL?Low (05/12/19 08:12:00) Abs Neutro: 5.45 x10(3)/mcL (05/12/19 08:12:00)   Globulin: 3.2 gm/mL (05/12/19 08:12:00) Abs Lymph: 1.33 x10(3)/mcL (05/12/19 08:12:00)   A/G Ratio:?0.9 ratio?Low (05/12/19 08:12:00) Abs Mono: 0.61 x10(3)/mcL (05/12/19 08:12:00)   NT pro BNP.:?3660 pg/mL?High (05/12/19 08:12:00) Abs Baso: 0.05 x10(3)/mcL (05/12/19 08:12:00)   Total CK: 71 unit/L (05/12/19 08:12:00) IG%: 0 % (05/12/19 08:12:00)   CK MB: 2.3 ng/mL (05/12/19 08:12:00) IG#: 0.02 (05/12/19 08:12:00)   Troponin-I:?0.063 ng/mL?High (05/12/19 08:12:00)    ?  ?  Diagnostic Results  Accession:?[id number]  Order:?CT Thorax W/O Contrast  Report Dt/Tm:?05/12/2019 10:42  Report:?  EXAMINATION: CT the thorax without contrast  ?  EXAMINATION DATE: 5/12/2019  ?  COMPARISON: Chest 2 views from same day  ?  TECHNIQUE: Multiple cross-sectional images of the thorax were obtained  without the use of contrast. Coronal and sagittal reformatted images  were obtained.  ?  CLINICAL HISTORY: Pneumonia  ?  FINDINGS:  ?  The heart size is enlarged with coronary artery calcifications. Trace  pericardial effusion is identified. Mitral annular calcifications are  also identified. The course and caliber of the thoracic aorta is  normal with a triple vessel arch. Multiple nonenlarged mediastinal and  hilar lymph nodes are identified. The largest which is in the  subcarinal region and measures 1.4 cm in short dimension.  ?  Moderate bilateral effusions. Smooth interseptal thickening or lung  apices. No area of consolidation. Dependent atelectatic changes of  the  lungs. Within the right lower lobe on image #35 of series #3 there is  a 8 mm pulmonary nodule. A second 4 mm pulmonary nodules identified  within the right middle lobe best identified on image #33 of series  #3. No pleural thickening. The trachea and airway are patent.  ?  Hyperdense lesions are identified within the partially visualized  right left kidney which are suggestive of hemorrhagic/proteinaceous  cyst. Likely postsurgical changes of the infrarenal/juxtarenal aorta  with a right-sided renal stent.  ?  No suspicious osseous lesion. Degenerative changes are identified.  Soft tissues are normal. Small lipoma is identified within the  external oblique musculature on the right.  ?  IMPRESSION:?  1. Findings are suggestive of developing interstitial/pulmonary edema  with bilateral moderate-sized effusions.  2. Scattered pulmonary nodules as described above. The largest which  measures 8 mm. Recommend follow-up imaging in 6 months to reassess  stability.  ?  ?  Accession:?[id number]  Order:?XR Chest 2 Views  Report Dt/Tm:?05/12/2019 10:16  Report:?  2 View Chest  ?  INDICATION: Dyspnea  ?  COMPARISON: 5/30/2014  ?  FINDINGS:  ?  PA and lateral views of the chest show mild pulmonary hyperinflation  with chronic interstitial changes bilaterally. No focal consolidation,  pleural effusion or pneumothorax. Cardiac silhouette and pulmonary  vasculature are normal. Aorta is partially calcified. No acute osseous  findings.  ?  IMPRESSION:  ?  Interstitial prominence bilaterally, similar to the prior study,  without acute findings  ?  ?  ?      73 year old M with history of CAD, PAD, renal artery stenosis, carotid artery stenosis, mesenteric artery stenosis, aortobifem graft, mild to moderate aortic stenosis here in the ED with complaints of SOB ?since Thursday. States he normally works out lifting weights but this time got short of breath doing so. Also complains of orthopnea, pnd, and bhardwaj with minimal exertion.  Denies CP or pressure or syncope but does state he gets this sensation of food getting stuck in his distal esophagus. No fevers or chills. He states he was diagnosed with CHF in 2002? And had to be hospitalized but no records indicating low EF from past in the computer system here.  PE vital signs hypertensive, slightly bradycardic, afebrile satting well on room air. Crackles at lung bases up to mid lung bilaterally, US lung with B lines throughout, minimal pleural effusion, minimal pericardial effusion EF reduced on my read of bedside US. 3/6 systolic murmur at cardiac base radiating into carotids, 1+ pitting edema bilateral LE otherwise benign exam  A/P  Acute decompensated heart failure  NSTEMI likely type 2 2/2 above  Mild to moderate AS  CAD, PAD, MODESTA, Carotid artery stenosis, mesenteric artery stenosis  HTN/ HLD/ DM2  Incidentally found Pulmonary Nodules  ?  Admit to telemetry  Lasix 40 BID IV, cautious fluid removal with AS, Lisinopril 20 added, hold BB, Crestor 20, ASA 81  Trend out trop and ekg x 2 more, EKG in ER with t wave inversions likely from LVH and repol  Echocardiogram ordered for am. Consult cards in am may need ischemic workup again  May need to have aortic valvuloplasty if symptoms are related to worsening stenosis  Follow up outpatient regarding his nodules, he is a former smoker  ?   I have performed a history and physical examination of the patient and discussed the management with the resident.  ???  [ x] I reviewed the residents note and agree with the documented findings and plan of care.  [ ] I reviewed the residents note and agree with the documented findings and plan of care except:  Patient seen and examined on AM of?5/13/19 with resident team ?  Acute decompensated heart failure with impressive aortic stenosis and appears to be significantly preload dependent based on response to vital signs  Repeating echo, holding beta blocker at this time and down titrating ace as above  Murmur of  aortic stenosis noted on examination with radiation to bilateral carotid arteries  Normotensive and without syncope or chest pain at this time  Cardiology followup upon discharge  ?   MD Missael  ID Staff  ?

## 2022-05-02 NOTE — HISTORICAL [facility]ERNER
This is a historical note converted from [place]. Formatting and pictures may have been removed.  Please reference [place] for original formatting and attached multimedia. Chief Complaint  Bilat. hand pain  History of Present Illness  72?yo?male?non-smoker?presents to ortho clinic for?initial visit?for?bilateral?hand pain.? Patient points to??diffuse. Patient dominate hand: right?R=L  Onset:??approximately 1?year  Current pain level: 9/10 (rated as?moderate) ?without pain medication. Quality described as?sharp?Patient reports taking?OTC?medications PRN pain with?no?relief.? Patient?denies?use of pain medication today.  Modifying Factors: ?Worse with/after activity;Improved with rest;??Stiffness after immobilization??Stiffness improved with less than 30 minutes of activity  Previous treatment:Conservative treatment for at least 3 months with HEP/ medications.?Oral steroids given per PCP approximately 2 months ago.  Previous injuries:?Denies  Associated Symptoms:?Crepitus/Grinding; ?No numbness or tingling;??No swelling;?No skin changes;?No weakness;?No decrease in ROM  Activity:?Sedentary, full ADLs;?Pain interferes with function/daily activity (mild)  Family History:?Family history of arthritis  PCP:?Rosaline Stringer NP [facility]  Review of Systems  Constitutional:No fever;No chills;No weight loss  CV:No swelling;No edema  GI:No urinary retention;No urinary incontinence  :No fecal incontinence  Skin:No rash;No wound  Neuro:No numbness/tingling;No weakness;No saddle anesthesia  MSK: As above  Psych:No depression;No anxiety  Heme/Lymph:No easy bruising;No easy bleeding;No lymphadenopathy  Immuno:No MRSA history  Physical Exam  Vitals & Measurements  HT:?172.72?cm? HT:?172.72?cm? WT:?77.1?kg? WT:?77.1?kg? BMI:?25.84?  MSK:BilateralHAND/ WRIST  General: No apparent distress;?well-nourished  Inspection:?No masses, no deformity, no skin discoloration,? no joint swelling, no contracture, no?presences of cysts/nodules.? No  muscle atrophy to the thenar eminence or intrinsic muscles of the first web space.  Palpation:?Tenderness noted at multiple joints in bilateral hands;??No swelling;No bony enlargement;?  ROM:?  ?????Open/closure of hand:?no abnormalities, movement is full and smooth  Strength:??  ????? strength:?Reduced?L>R  Special Tests:  ?????Trigger finger presence: ?Negative  ?????Cyst transillumination:?Negative  ?????Phalen test:Positive?  ?????Tinel test:?Negative?  ?????Nilson carpal compression:Positive  ?????Finkelstein test:?Negative  Neurovascular:?Intact; 2+?distal pulse, sensation intact to light touch  Neuro/Psych: Awake, Alert, Oriented; Normal mood and affect  Lymphatic: No LAD  Skin and Soft Tissue: No bruising, No ecchymosis; No rash; Skin intact  Assessment/Plan  1.?Osteoarthritis of hands, bilateral  ?1.? Discussed with patient diagnosis and treatment recommendations.? Handout given.  2.? Imaging:?Radiological studies ordered and independently reviewed; Discussed with patient;  3.? Treatment plan: Conservative treatment to include oral glucosamine 1500 mg/day; Topical capsaicin as needed; Hot or cold therapy; Adequate vitamin C/D intake  4.? Procedure:?Discussed CSI injections as treatment options; since conservative measures did not improve symptoms patient consented for CSI today  5.? Activity:?Activity as tolerated; activity modifications as necessary; HEP for carpal tunnel exercises- handout given  6.? Therapy:Nocturnal splinting of wrist in neutral position  7.? Medication:?First line treatment with unable?to tolerate?NSAIDs due?to kidney?issues;?? patient instructed to use Tylenol PRN OTC according to label instruction; Medication precautions?given  8.? RTC:?4 months  9.? Additional work-up:?None  ?  2.?Bilateral hand numbness  ?same as above  Ordered:  Lidocaine inj., 1 mL, form: Injection, Intra-Articular, Once, first dose 03/15/18 11:16:00 CDT, stop date 03/15/18 11:16:00 CDT  Lidocaine inj., 2 mL,  form: Injection, Intra-Articular, Once, first dose 03/15/18 11:15:00 CDT, stop date 03/15/18 11:15:00 CDT  triamcinolone, 40 mg, form: Injection, Intra-Articular, Once, first dose 03/15/18 11:15:00 CDT, stop date 03/15/18 11:15:00 CDT  triamcinolone, 40 mg, form: Injection, Intra-Articular, Once, first dose 03/15/18 11:16:00 CDT, stop date 03/15/18 11:16:00 CDT  Clinic Follow up, *Est. 07/15/18 3:00:00 CDT, Order for future visit, Bilateral hand numbness, [facility]  Injections Tendon Sheath/lig [id number] PC, 03/15/18 11:15:00 CDT, [facility], Routine, 03/15/18 11:15:00 CDT  Injections Tendon Sheath/lig [id number] PC, 03/15/18 11:16:00 CDT, [facility], Routine, 03/15/18 11:16:00 CDT  Office/Outpatient Visit Level 4 Established [id number] PC, 25, Bilateral hand numbness, [facility], 03/15/18 11:15:00 CDT  ?  3.?HTN (hypertension)(  Confirmed  )  ?1.??Educated patient to reduce salt in diet?and?limit alcohol consumption to less than 1 ounce per day  2.? Patient instructed?to take?medications that have been prescribed for HTN?as instructed  3.? Follow up with PCP for management and care of HTN?  ?   Problem List/Past Medical History  Ongoing  Aortic stenosis, severe(  Confirmed  )  Aortic valve disorder  Chest pain(  Confirmed  )  Congestive heart failure(  Confirmed  )  Constipation  HLD (hyperlipidemia)(  Confirmed  )  HTN (hypertension)  HTN (hypertension)(  Confirmed  )  Insomnia  Joint pain  PVD (peripheral vascular disease)  PVD (peripheral vascular disease)(  Confirmed  )  Well adult exam  Historical  No qualifying data  Procedure/Surgical History  Femoral-popliteal artery bypass graft with vein (01/01/2010), Aorta to superior mesenteric artery bypass graft (01/01/2009), Fluoroscopy and stent renal artery (01/01/2008), CEA - Carotid endarterectomy (01/01/2006).  Medications  aspirin 325 mg oral tablet, 325 mg= 1 tab(s), Oral, Daily  atorvastatin 80 mg oral tablet, 80 mg= 1 tab(s), Oral, Daily, 3  refills  carvedilol 25 mg oral tablet, 25 mg= 1 tab(s), Oral, BID, 3 refills  glucosamine 750 mg oral tablet, 1500 mg= 2 tab(s), Oral, BID  hydrOXYzine hydrochloride 50 mg oral tablet, 100 mg= 2 tab(s), Oral, Once a day (at bedtime), 2 refills  Kenalog-40 injectable suspension, 40 mg= 1 mL, Intra-Articular, Once  Kenalog-40 injectable suspension, 40 mg= 1 mL, Intra-Articular, Once  lactulose 10 g/15 mL oral syrup, 20 gm= 30 mL, Oral, TID, 3 refills  lidocaine 1% PF 2ml inj, 1 mL, Intra-Articular, Once  lidocaine 1% PF 2ml inj, 1 mL, Intra-Articular, Once  lisinopril 10 mg oral tablet, 20 mg= 2 tab(s), Oral, Daily, 3 refills  Nitrostat 0.4 mg sublingual TAB, 0.4 mg= 1 tab(s), SL, q5min, PRN  Norvasc 5 mg oral tablet, 5 mg= 1 tab(s), Oral, Daily  Triple Antibiotic Ointment topical, 1 dose(s), TOP, Once  Tylenol 8 HR Arthritis Pain, 1300 mg, Oral, q8hr  Zetia 10 mg oral tablet, 10 mg= 1 tab(s), Oral, Daily, 6 refills  Allergies  penicillin G benzathine  Social History  Alcohol - Denies Alcohol Use, 09/11/2014  Past, 08/11/2016  Employment/School  Retired, Highest education level: High school., 04/10/2015  Exercise - Does not exercise, 03/06/2015  Exercise frequency: 3-4 times/week. Self assessment: Fair condition. Exercise type: Walking, Weight lifting., 02/15/2017  Home/Environment  Lives with Spouse. Living situation: Home/Independent. Alcohol abuse in household: No. Substance abuse in household: No. Smoker in household: Yes. Feels unsafe at home: No. Safe place to go: Yes. Family/Friends available for support: Yes., 04/10/2015  Nutrition/Health  Regular, Caffeine intake amount: 2 coke zero per day., 02/15/2017  Substance Abuse - Denies Substance Abuse, 09/11/2014  Never, 08/11/2016  Tobacco - Denies Tobacco Use, 09/11/2014  Former smoker, 08/11/2016  Family History  CVA (cerebral infarction): Father.  Parkinson disease.: Mother.  Immunizations  Vaccine Date Status Comments   influenza virus vaccine, inactivated  09/27/2017 Recorded [facility]   pneumococcal 23-polyvalent vaccine 02/15/2017 Given    tetanus/diphtheria/pertussis, acel(Tdap) 09/11/2014 Given other (see comment)   influenza virus vaccine, inactivated 10/20/2013 Recorded    influenza virus vaccine, inactivated 10/20/2013 Recorded    Diagnostic Results  03/15/2018 10:32 CDT XR Hand Right Minimum 3 Views  Radiology Report  EXAM: XR Hand Right Minimum 3 Views  INDICATION: Pain  TECHNIQUE: 3 views of the right hand are obtained.  COMPARISON: Plain films of the left hand on the same date  FINDINGS: There is no fracture or dislocation. There is persistent  flexion of the fifth proximal interphalangeal joint throughout the  exam. No aggressive lytic or blastic osseous lesion is identified. The  overlying soft tissues are unremarkable without opaque foreign body.  Degenerative changes are noted throughout the right hand and wrist,  including joint space narrowing and mild marginal osteophytic  spurring.  IMPRESSION:?  1. Degenerative changes throughout the right hand and wrist,  suggestive of osteoarthritis.  2. Persistent flexion of the fifth proximal interphalangeal joint,  which may relate to underlying tendinous injury.  ?   03/15/2018 10:32 CDT XR Hand Left Minimum 3 Views  Radiology Report  EXAM: XR Hand Left Minimum 3 Views  INDICATION: Pain  TECHNIQUE: 3 views of the left hand are obtained.  COMPARISON: None  FINDINGS: There is no fracture or dislocation. Radiodensity overlying  the thenar eminence measures 2 mm. There are mild degenerative changes  throughout the left hand and wrist, with joint space narrowing and  marginal osteophytic spurring. No aggressive lytic or blastic osseous  lesion is identified.  IMPRESSION:?  1. Mild degenerative changes throughout the left hand and wrist,  suggestive of osteoarthritis.  2. 2 mm radiopaque density overlying the thenar eminence.     [1]?XR Hand Left Minimum 3 Views; Dasia Serrano DO 03/15/2018 10:32  CDT

## 2022-05-02 NOTE — HISTORICAL [facility] CERNER
This is a historical note converted from Brittany. Formatting and pictures may have been removed.  Please reference Brittany for original formatting and attached multimedia. Chief Complaint  F/U ?Bilat CTS  History of Present Illness  *74?yo?male?non-smoker?presents to ortho clinic for?routine follow up?for?bilateral?hand pain.? Patient points to??diffuse. Patient dominate hand:? ?right? 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 Patient also with bilateral?hand numbness at prior 2?visit?was referred for EMG study, patient reports?he never received a call for appointment.??Desires ?CSI injections for carpal tunnel today.? Also reports?recent hospitalization related to shortness of breath and chest pain. ?EMG are reviewed. ?Patient reports will be having?scope for aortic valve replacement?and is awaiting?cardiology surgeon.  Onset: ?Insidious over years?fluctuates  Current pain level: 4/10??without pain medication. Quality described as??sharp, numbness?.? Patient?denies?use of pain medication today.?  Medications r/t complaint: Patient reports using?RX / OTC?medications in past including:?Tylenol OTC, topical diclofenac, gabapentin.??Currently taking?OT Tylenol??PRN?pain with?mild?relief.?  Modifying Factors: ?Worse with/after activity;Improved with rest;??Stiffness after immobilization??Stiffness improved with less than 30 minutes of activity  Injury:?Denies  Previous treatment: HEP?RX per ortho 3/15/18 and reports he does them regularly?; can not tolerate po RX NSAIDs, PT??denies , CSI 3/15/18 & 8/20/19 with great relief  Associated Symptoms:?Crepitus/Grinding;??numbness or tingling;??No swelling;?No skin changes;?Weakness of ?bilateral hands;?Mild decrease in ROM;?sleep unaffected by pain  Activity:?Sedentary, full ADLs;?Pain interferes with function/daily activity (mild)  Family History:?Family history of arthritis  PMH:? DX CVD  1/2020; DX CHF; denies?DM ; denies?RA; denies?gout; ?RX anticoagulants; denies intolerance to NSAIDs s/t GI issues; intolerance to NSAIDs s/t kidney disease- stent in right renal artery  PCP:?Dr. Bolaños [facility]?, referral source Rosaline Stringer NP [facility]  Employment:? 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  Note:? Patient seen in past in ortho since 2016 for shoulder pain also  Review of Systems  Constitutional:No fever;No chills;No weight loss  CV:No swelling;No edema  GI:No urinary retention;No urinary incontinence  :No fecal incontinence  Skin:No rash;No wound  Neuro:No numbness/tingling;No weakness;No saddle anesthesia  MSK: As above  Psych:No depression;No anxiety  Heme/Lymph:No easy bruising;No easy bleeding;No lymphadenopathy  Immuno:No MRSA history  Physical Exam  Vitals & Measurements  HT:?172.72?cm? WT:?68.2?kg? BMI:?22.86?  MSK:Bilateral HAND/ WRIST  General: No apparent distress;?well-nourished  Inspection:?No masses,? deformity of 5th finger right hand?, no skin discoloration,??mild diffuse?joint swelling bilateral hands, no contracture, no?presences of cysts/nodules.? No muscle atrophy to the thenar eminence or intrinsic muscles of the first web space.  Palpation:?Tenderness noted at multiple joints in bilateral hands;??No bony enlargement;?  ROM:?  ?????Open/closure of hand:?no abnormalities, movement is full and smooth  Strength:??  ????? strength:?Reduced?L>R  Special Tests:  ?????Trigger finger presence: ?Negative  ?????Cyst transillumination:?Negative  ?????Phalen test:?positive  ?????Tinel test:?Negative?  ?????Nilson carpal compression:?positive  ?????Finkelstein test:?Negative  Neurovascular:?Intact; 2+?distal pulse, sensation intact to light touch  Neuro/Psych: Awake, Alert, Oriented; Normal mood and affect  Lymphatic: No LAD  Skin and Soft Tissue: No bruising, No ecchymosis; No rash; Skin intact  Assessment/Plan  1.?Bilateral carpal tunnel  syndrome?G56.03  ?1.? Discussed with patient diagnosis and treatment recommendations.? Handout given.  2.? Imaging:?Radiological studies ordered, reviewed?and independently interpreted by me; Discussed with patient? DJD noted.  3.? Treatment plan: Conservative treatment to include ?oral glucosamine 1500 mg/day; Topical capsaicin as needed; Hot or cold therapy; Adequate vitamin C/D intake  4.? Procedure:?Discussed CSI injections as treatment options; since conservative measures did not improve symptoms patient consented for CSI today  5.? Activity:?Activity as tolerated; activity modifications as necessary  6.? Therapy:Nocturnal splinting of??bilateral ?wrist in neutral position  7.? Medication:? continue medications as RX per PCP  8.? RTC:?after EMG testing complete  9.? Additional work-up: EMG study ordered, referral made.  Ordered:  triamcinolone, 40 mg, form: Injection, Intra-Articular, Once, first dose 01/20/20 11:50:00 CST, stop date 01/20/20 11:50:00 CST  triamcinolone, 40 mg, form: Injection, Intra-Articular, Once, first dose 01/20/20 11:50:00 CST, stop date 01/20/20 11:50:00 CST  Office/Outpatient Visit Level 4 Established [id number] PC, Bilateral carpal tunnel syndrome, [facility], 01/20/20 11:50:00 CST  ?  2.?HTN (hypertension)(  Confirmed  )?I10  ?1.??Educated patient to reduce salt in diet?and?limit alcohol consumption  2.? Patient instructed?to take?medications that have been prescribed for HTN?as instructed  3.? Follow up with PCP for management and care of HTN?  ?  Orders:  Clinic Follow-up PRN, 01/20/20 11:50:00 CST  Injections therap Carpal Tunnel [id number] PC, 01/20/20 11:50:00 CST, [facility], Routine, 01/20/20 11:50:00 CST  Injections therap Carpal Tunnel [id number] PC, 01/20/20 11:50:00 CST, [facility], Routine, 01/20/20 11:50:00 CST  Referrals  Ambulatory Referral, Specialty: Neurology, Reason: EMG study bilateral hands, Refer To: Eunice OSBORNE, Casper BLOCK, LGMD [facility],  [address] [place] [address] , [address], [place] [address]., Start: 01/20/20 11:51:00 CST, Bilateral carpal tunnel syndrome  Clinic Follow-up PRN, 01/20/20 11:50:00 CST   Problem List/Past Medical History  Ongoing  Aortic stenosis, Mild  Aortic valve disorder  Chest pain(  Confirmed  )  Congestive heart failure(  Confirmed  )  Constipation  HLD (hyperlipidemia)(  Confirmed  )  HTN (hypertension)  HTN (hypertension)(  Confirmed  )  Insomnia  Joint pain  PVD (peripheral vascular disease)  PVD (peripheral vascular disease)(  Confirmed  )  Well adult exam  Historical  Cardiac ejection fraction 61%  Procedure/Surgical History  Femoral-popliteal artery bypass graft with vein (01/01/2010)  Aorta to superior mesenteric artery bypass graft (01/01/2009)  Fluoroscopy and stent renal artery (01/01/2008)  CEA - Carotid endarterectomy (01/01/2006)   Medications  aspirin 81 mg oral tablet, CHEWABLE, 81 mg= 1 tab(s), Oral, Daily, 6 refills  atorvastatin 80 mg oral tablet, 80 mg= 1 tab(s), Oral, Daily, 2 refills  Coreg 3.125 mg oral tablet, 3.125 mg= 1 tab(s), Oral, BID, 3 refills  diclofenac 1% topical gel, 2 gm, TOP, QID, PRN, 5 refills,? ?Not taking  DuoNeb 0.5 mg-2.5 mg/3 mL inhalation solution, 3 mL, INH, QID, PRN, 3 refills  Flonase 50 mcg/inh nasal spray, 1 spray(s), Nasal, BID, PRN, 1 refills  gabapentin 300 mg oral capsule, 300 mg= 1 cap(s), Oral, At Bedtime, 3 refills  lactulose 10 g/15 mL oral syrup, 20 gm= 30 mL, Oral, TID, 3 refills  Lasix 20 mg oral tablet, 20 mg= 1 tab(s), Oral, Daily, PRN, 3 refills  lisinopril 2.5 mg oral tablet, 2.5 mg= 1 tab(s), Oral, Daily, 3 refills  Nitrostat 0.4 mg sublingual tab, 0.4 mg= 1 tab(s), SL, q5min, PRN, 5 refills  sertraline 50 mg oral tablet, 50 mg= 1 tab(s), Oral, Daily, 3 refills  traZODONE 50 mg oral tablet ( Desyrel ), 50 mg= 1 tab(s), Oral, Once a day (at bedtime), 3 refills  Triple Antibiotic Ointment topical, 1 dose(s), TOP, Once  Allergies  penicillin G benzathine  Social  History  Abuse/Neglect  No, 01/10/2020  Alcohol - Denies Alcohol Use, 09/11/2014  Past, 08/11/2016  Employment/School  Retired, Highest education level: High school., 04/10/2015  Exercise - Does not exercise, 03/06/2015  Exercise duration: 60. Exercise frequency: 3-4 times/week. Self assessment: Good condition. Exercise type: Walking, Weight lifting., 12/11/2018  Home/Environment  Lives with Spouse. Living situation: Home/Independent. Alcohol abuse in household: No. Substance abuse in household: No. Smoker in household: Yes. Feels unsafe at home: No. Safe place to go: Yes. Family/Friends available for support: Yes., 04/10/2015  Nutrition/Health  Regular, Wants to lose weight: No., 12/11/2018  Regular, Caffeine intake amount: 2 coke zero per day., 02/15/2017  Sexual  Sexual orientation: Straight or heterosexual. Gender Identity Identifies as male., 01/30/2019  Substance Use - Denies Substance Abuse, 09/11/2014  Never, 08/11/2016  Tobacco - Denies Tobacco Use, 09/11/2014  Former smoker, quit more than 30 days ago, N/A, 01/20/2020  Family History  CVA (cerebral infarction): Father.  Parkinson disease.: Mother.  Immunizations  Vaccine Date Status Comments   zoster vaccine, inactivated 01/07/2020 Given    pneumococcal 13-valent conjugate vaccine 06/10/2019 Given Pt raquel well. No adverse reaction noted at d/c. Consent signed and VIS given.   influenza virus vaccine, inactivated 09/27/2017 Recorded [facility]   pneumococcal 23-polyvalent vaccine 02/15/2017 Given    tetanus/diphtheria/pertussis, acel(Tdap) 09/11/2014 Given other (see comment)   influenza virus vaccine, inactivated 10/20/2013 Recorded    influenza virus vaccine, inactivated 10/20/2013 Recorded    Health Maintenance  Health Maintenance  ???Pending?(in the next year)  ??? ??OverDue  ??? ? ? ?Coronary Artery Disease Maintenance-Antiplatelet Agent Prescribed due??and every?  ??? ? ? ?Coronary Artery Disease Maintenance-Lipid Lowering Therapy due??and  every?  ??? ? ? ?Pneumococcal Vaccine due??and every?  ??? ? ? ?Hypertension Management-Education due??08/16/18??and every 1??year(s)  ??? ??Due?  ??? ? ? ?Diabetes Maintenance-Foot Exam due??01/20/20??and every?  ??? ? ? ?HF-Ejection Fraction Past 13 Months if Hospitalized due??01/20/20??and every 1??year(s)  ??? ? ? ?HF-Fluid Restriction/Low Sodium Diet Education due??01/20/20??Variable frequency  ??? ??Due In Future?  ??? ? ? ?Colorectal Screening (Senior Wellness) not due until??02/05/20??and every 1??year(s)  ??? ? ? ?ADL Screening not due until??06/10/20??and every 1??year(s)  ??? ? ? ?Diabetes Maintenance-Eye Exam not due until??10/16/20??and every 1??year(s)  ??? ? ? ?Advance Directive not due until??01/01/21??and every 1??year(s)  ??? ? ? ?Alcohol Misuse Screening not due until??01/01/21??and every 1??year(s)  ??? ? ? ?Cognitive Screening not due until??01/01/21??and every 1??year(s)  ??? ? ? ?Fall Risk Assessment not due until??01/01/21??and every 1??year(s)  ??? ? ? ?Functional Assessment not due until??01/01/21??and every 1??year(s)  ??? ? ? ?Geriatric Depression Screening not due until??01/01/21??and every 1??year(s)  ??? ? ? ?Obesity Screening not due until??01/01/21??and every 1??year(s)  ??? ? ? ?Diabetes Maintenance-HgbA1c not due until??01/06/21??and every 1??year(s)  ??? ? ? ?Diabetes Maintenance-Fasting Lipid Profile not due until??01/06/21??and every 1??year(s)  ??? ? ? ?Hypertension Management-Blood Pressure not due until??01/06/21??and every 1??year(s)  ??? ? ? ?HF-ACEI/ARB Prescribed if Clinically Indicated not due until??01/07/21??and every 1??year(s)  ??? ? ? ?HF-Heart Failure Education not due until??01/15/21??and every 1??year(s)  ??? ? ? ?Hypertension Management-BMP not due until??01/15/21??and every 1??year(s)  ??? ? ? ?Aspirin Therapy for CVD Prevention not due until??01/16/21??and every 1??year(s)  ???Satisfied?(in the past 1 year)  ??? ??Satisfied?  ??? ? ? ?ADL Screening  on??06/10/19.??Satisfied by Katherine Christiansen LPN  ??? ? ? ?Advance Directive on??01/07/20.??Satisfied by Bria Bojorquez LPN  ??? ? ? ?Alcohol Misuse Screening on??01/07/20.??Satisfied by Shiloh Madrid MD  ??? ? ? ?Aspirin Therapy for CVD Prevention on??01/16/20.??Satisfied by Melissa Jerez LPN  ??? ? ? ?Blood Pressure Screening on??01/16/20.??Satisfied by Melissa Jerez LPN  ??? ? ? ?Body Mass Index Check on??01/20/20.??Satisfied by Jazzmine Fisher LPN  ??? ? ? ?Cognitive Screening on??01/07/20.??Satisfied by Bria Bojorquez LPN  ??? ? ? ?Colorectal Screening (Senior Wellness) on??02/05/19.??Satisfied by William Chacon  ??? ? ? ?Coronary Artery Disease Maintenance-Lipid Lowering Therapy on??01/15/20.??Satisfied by Lilo Lopez RN  ??? ? ? ?Depression Screening on??01/07/20.??Satisfied by Bria Bojorquez LPN  ??? ? ? ?Diabetes Maintenance-HgbA1c on??01/07/20.??Satisfied by Yisel Mayorga  ??? ? ? ?Diabetes Screening on??01/16/20.??Satisfied by Laurie Ordonez  ??? ? ? ?Fall Risk Assessment on??01/20/20.??Satisfied by Jazzmine Fisher LPN  ??? ? ? ?Functional Assessment on??01/07/20.??Satisfied by Bria Bojorquez LPN  ??? ? ? ?Geriatric Depression Screening on??01/07/20.??Satisfied by Bria Bojorquez LPN  ??? ? ? ?HF-ACEI/ARB Prescribed if Clinically Indicated on??01/07/20.??Satisfied by Shiloh Madrid MD  ??? ? ? ?HF-Beta Blocker Prescribed if Clinically Indicated on??01/16/20.??Satisfied by Shiloh Madrid MD  ??? ? ? ?Hypertension Management-Blood Pressure on??01/16/20.??Satisfied by Melissa Jerez LPN  ??? ? ? ?Lipid Screening on??01/07/20.??Satisfied by Yisel Mayorga  ??? ? ? ?Obesity Screening on??01/20/20.??Satisfied by Jazzmine Fisher LPN  ??? ? ? ?Pneumococcal Vaccine on??06/10/19.??Satisfied by Katherine Christiansen LPN  ??? ? ? ?Smoking Cessation (Coronary Artery Disease) on??05/13/19.??Satisfied by Dary Rascon LPN  ??? ? ? ?Smoking Cessation  (Diabetes) on??05/13/19.??Satisfied by Dary Rascon LPN  ??? ? ? ?Zoster Vaccine on??01/07/20.??Satisfied by Bria Bojorquez LPN  ??? ??Refused?  ??? ? ? ?Diabetes Maintenance-Foot Exam on??01/07/20.??Recorded by Shiloh Madrid MD??Reason: Patient Refuses  ?  Diagnostic Results  01/20/2020 10:51 CST XR Hand Right Minimum 3 Views  Radiology Report  XR Hand Right Minimum 3 Views  HISTORY: Pain  COMPARISON: None.  FINDINGS:  No acute displaced fractures or dislocations.  There are some degenerative changes with some narrowing of the  interphalangeal joints as well as some narrowing of the metacarpal  phalangeal joints and narrowing of the first carpometacarpal joint  No blastic or lytic lesions.  Soft tissues within normal limits. Persistent flexion deformity  identified of the proximal interphalangeal joint of the fifth digit  similar to previous exam  IMPRESSION:  Degenerative changes.  Persistent deformity as above  01/20/2020 10:51 CST XR Hand Left Minimum 3 Views  Radiology Report  XR Hand Left Minimum 3 Views  HISTORY: Pain  COMPARISON: None.  FINDINGS:  Again identified is a 2 mm radiopaque density overlying the thenar  eminence similar to what was seen on the previous examination of March 2018  No acute displaced fractures or dislocations.  There is some narrowing of the interphalangeal joints. There is  significant narrowing and degenerative changes of the first  carpometacarpal joint  No blastic or lytic lesions.  Soft tissues within normal limits.  IMPRESSION:  Radiopaque density as above.  Degenerative changes.     [1]?XR Hand Right Minimum 3 Views; Geovani Malagon MD 01/20/2020 10:51 CST

## 2022-05-02 NOTE — HISTORICAL [facility] CERNER
This is a historical note converted from [place]. Formatting and pictures may have been removed.  Please reference [place] for original formatting and attached multimedia. Chief Complaint  Pt c/o epigastric pain and sob x 3 weeks. Pt denies CP, pt states when I eat I feel like my food just stays in my chest. Pt denies difficulty swallowing/choking  Reason for Consultation  SOB, CHF  History of Present Illness  ?  72 y/o M with hx of vascular disease (PAD and CAD) with Cor angio in 2015 showing occluded LCx with L-L and R-L collaterals, otherwise NOCAD, moderate AI, essential HTN, mild T2Dm (A1c 6.6-diet controlled)?who presented over the weekend to ED with complaints of progressively worsening dyspnea w/o angina?x3-4 days. Patient states that he does not recall eating salty food prior to this, has been complaint with Rx except that he had stopped his Coreg recently?2/2 low BP readings and feeling lightheaded. He does not smoke or use illcit drugs. On presentation pro-BNP >3000, TNI mildly elevated consistent with CHF, ECG all showing LVH with strain pattern but not concerning for dynamic ischemic changes. CXR and CT chest both showing early pulmonary vascular congestion. Net negative 2.6 L / 24 hrs on Lasix 20 mg IVP daily. Feels well, back to baseline and tolerating ambulation in halls without difficulty.  ?  ?  ECHO 5/13/19:  LVEF 50%  LVH  Moderate AI, aortic sclerosis with mild stenosis  ?  [facility] 12/2015  CONCLUSIONS:  1.??Left anterior descending artery with 30-40% midvessel stenosis adjacent to the first diagonal with moderate atherosclerotic plaque present.  2.??Circumflex chronically occluded in its midsection with collateral flow from the right and left sides.  3.??Large first obtuse marginal also supplying circumflex distribution without significant stenosis.  4.??Moderate-size but dominant right coronary artery supplying the posterior descending artery and posterolateral segment branches with mild diffuse  atherosclerotic plaque present but no significant stenosis.  5.??Normal left ventricular cavity size and contractility. Left ejection fraction greater than 55%.  6.??Mild aortic valve gradient, 12 mmHg.  7.??Normal left ventricular end diastolic pressure.  8.??Rest of the findings as described above. [1]  ?  ?  Echocardiogram May 2018:  The left ventricular ejection fraction is estimated at 61%.  Left ventricular size is normal.  Mildly increased left ventricle wall thickness.  Normal size right ventricle cavity.  There is mild aortic stenosis with valve area of 1.2 cm? and a mean gradient of 24 mm Hg.  2+ aortic regurgitation present.  Trace mitral regurgitation.  Mild annular calcification.  Calcification of the mitral valve noted.  No evidence of significant pericolic effusion is noted.  ?   Echocardiogram April 5, 2017:  LVEF measured at approximately 57%  LV size is normal  E/A flow reversal noted, suggestive of diastolic dysfunction  Right ventricular size with preserved RV function  2+ aortic regurgitation present?  mild aortic stenosis, mean gradient of 16 mm Hg  normal mitral valve  Mitral annular calcification is present  no evidence of any pericardial?effusion  ?   US Abd/aorta 2/2017:  IMPRESSION:  Normal resting AKANKSHA  Aortobiiliac bypass graft without evidence of dilatation or fluid  collection  No elevation in the anastomotic location  Elevated SMA velocity suggestive of greater than 70% stenosis.  ?  ?   Carotid US 2/2017:  IMPRESSION:  Right internal carotid artery stenosis less than 50%  Left internal carotid artery stenosis less than 50% no evidence of  recurrent or residual disease in the endarterectomy site.  Study is similar to the previous study of 2016  ?  ?   Echocardiogram February 1, 2016:  Normal left ventricular cavity with overall normal systolic function, estimated at 68%  Mild MR  Mild to moderate aortic stenosis is present  Aortic valve?area-1.1 cm?, mean gradient-19.06 mm Hg  Aortic  regurgitation is present  Tricuspid regurgitation  Mildly dilated left atrium  Normal right atrial dimension with no evidence of mass or thrombus noted  Normal right ventricular size with preserved RV function  [2]  Review of Systems  CONSTITUTIONAL: No weight loss, fever, chills, weakness or fatigue.  HEENT:?No blindness.?No hearing loss  SKIN: No rash or itching.  CARDIOVASCULAR: No chest pain, chest pressure or chest discomfort. No palpitations, +LE edema.  RESPIRATORY: +shortness of breath, dry cough  GASTROINTESTINAL: No anorexia, nausea, vomiting or diarrhea. No abdominal pain or blood.  GENITOURINARY: No burning on urination.  NEUROLOGICAL: No headache, dizziness, syncope, paralysis, ataxia, numbness or tingling in the extremities. No change in bowel or bladder control.  MUSCULOSKELETAL: No muscle, back pain, joint pain or stiffness.  HEMATOLOGIC: No anemia, bleeding or bruising. [3]  Physical Exam  Vitals & Measurements  T:?36.6? ?C (Oral)? TMIN:?36.3? ?C (Oral)? TMAX:?37.0? ?C (Oral)? HR:?63(Peripheral)? RR:?18? BP:?100/62? SpO2:?93%? WT:?70.25?kg?  General: No acute distress.? Breathing on room air  Eye: Extraocular movements are intact, Normal conjunctiva. ?PERRLA  HENT: Normal hearing, Oral mucosa is moist, no pharyngeal erythema  Neck: Supple, Non-tender, +?JVD and hepatojugular reflex noted on left side  Respiratory: Crackles heard in bilateral fields, no wheezes appreciated  Cardiovascular: Regular rhythm and rate, normal S1, S2, Systolic murmur consistent with aortic stenosis heard,?unable to hear?radiation to carotids.?Normal peripheral perfusion,?2+ pitting?edema up to the ankles b/l  Gastrointestinal: Soft, Non-tender, bowel sounds present  Musculoskeletal: No tenderness, no swelling  Integumentary: Warm, Moist, No pallor  Neurologic: Alert, Oriented, No gross focal deficits, Cranial Nerves II-XII are grossly intact.  Cognition and Speech: Oriented, Speech clear and coherent, Functional cognition  intact.  Psychiatric: Cooperative, Appropriate mood & affect [4]  Assessment/Plan  CHF NYHA class I?I50.9  Epigastric Pain?[id number]  Shortness of breath?[id number]  ?  ?  Plan:  agree with ASA/Statin?doses.  Agree with Lisinopril 20 mg daily. Would not resume Coreg on discharge.  Can have Lasix 20 mg PO every other day.  Needs to weight self daily and continue taking his BP at home.  Avoid high salt foods.  ?  Continue to follow up with cardiology as outpatient?can have 2DECHO rechecked in 6-12 months (sooner if sx change).  ?  Ok to discharge home from the CV standpoint with recs as above.  ?  ?  ?  ?  ?   Problem List/Past Medical History  Ongoing  Aortic stenosis, Mild  Aortic valve disorder  Chest pain(  Confirmed  )  Congestive heart failure(  Confirmed  )  Constipation  HLD (hyperlipidemia)(  Confirmed  )  HTN (hypertension)  HTN (hypertension)(  Confirmed  )  Insomnia  Joint pain  PVD (peripheral vascular disease)  PVD (peripheral vascular disease)(  Confirmed  )  Well adult exam  Historical  Cardiac ejection fraction 61%  Procedure/Surgical History  Femoral-popliteal artery bypass graft with vein (01/01/2010)  Aorta to superior mesenteric artery bypass graft (01/01/2009)  Fluoroscopy and stent renal artery (01/01/2008)  CEA - Carotid endarterectomy (01/01/2006)   Medications  Inpatient  acetaminophen, 1000 mg= 2 tab(s), Oral, q6hr, PRN  aspirin 81 mg oral tablet, CHEWABLE, 81 mg= 1 tab(s), Oral, Daily  atorvastatin 20 mg oral tablet, 80 mg= 4 tab(s), Oral, Daily  labetalol, 10 mg= 2 mL, IV Push, q2hr, PRN  Lasix, 20 mg= 2 mL, IV Push, Daily  lisinopril 10 mg oral tablet, 20 mg= 2 tab(s), Oral, Daily  Lovenox, 40 mg= 0.4 mL, Subcutaneous, Daily  Triple Antibiotic Ointment topical, 1 dose(s), TOP, Once  Home  atorvastatin 80 mg oral tablet, 80 mg= 1 tab(s), Oral, Daily  carvedilol 25 mg oral tablet, 25 mg= 1 tab(s), Oral,  BID  Allergies  penicillin G benzathine  Social History  Alcohol - Denies Alcohol Use, 09/11/2014  Past, 08/11/2016  Employment/School  Retired, Highest education level: High school., 04/10/2015  Exercise - Does not exercise, 03/06/2015  Exercise duration: 60. Exercise frequency: 3-4 times/week. Self assessment: Good condition. Exercise type: Walking, Weight lifting., 12/11/2018  Exercise frequency: 3-4 times/week. Self assessment: Fair condition. Exercise type: Walking, Weight lifting., 02/15/2017  Home/Environment  Lives with Spouse. Living situation: Home/Independent. Alcohol abuse in household: No. Substance abuse in household: No. Smoker in household: Yes. Feels unsafe at home: No. Safe place to go: Yes. Family/Friends available for support: Yes., 04/10/2015  Nutrition/Health  Regular, Wants to lose weight: No., 12/11/2018  Regular, Caffeine intake amount: 2 coke zero per day., 02/15/2017  Sexual  Sexual orientation: Straight or heterosexual. Gender Identity Identifies as male., 01/30/2019  Substance Abuse - Denies Substance Abuse, 09/11/2014  Never, 08/11/2016  Tobacco - Denies Tobacco Use, 09/11/2014  Former smoker, quit more than 30 days ago, N/A, 05/12/2019  Former smoker, quit more than 30 days ago, N/A, 01/30/2019  Family History  CVA (cerebral infarction): Father.  Parkinson disease.: Mother.  Immunizations  Vaccine Date Status Comments   influenza virus vaccine, inactivated 09/27/2017 Recorded [facility]   pneumococcal 23-polyvalent vaccine 02/15/2017 Given    tetanus/diphtheria/pertussis, acel(Tdap) 09/11/2014 Given other (see comment)   influenza virus vaccine, inactivated 10/20/2013 Recorded    influenza virus vaccine, inactivated 10/20/2013 Recorded       [1]?Cardiology Office Visit Note; Shreya Palacios 01/17/2019 11:17 CST  [2]?Cardiology Office Visit Note; Shreya Palacios 01/17/2019 11:17 CST  [3]?IM Admission [facility] & P; [facility], [place] 05/12/2019 11:44  CDT  [4]?IM Admission H & P; [facility], Janice 05/12/2019 11:44 CDT

## 2022-05-02 NOTE — HISTORICAL [facility]
This is a historical note converted from [employer]. Formatting and pictures may have been removed.  Please reference [place] for original formatting and attached multimedia. Chief Complaint  Shortness of breath  History of Present Illness  PCP: Shiloh Madrid MD  CODE STATUS: Full  ?  Mr. Alejandra is a 74-year-old  male with a significant history of systolic heart failure with an EF of 20-25%, moderate-severe aortic valve stenosis and remote smoking history.? He presented to the emergency department with increased exertional dyspnea for the past 3 days with associated orthopnea.? Also endorses clear/white productive cough.? He denies any other symptoms; no fever, chills or chest pain.? He denies any recent weight gain, nocturnal paroxysmal dyspnea and states he has been compliant with his low-sodium diet.? Denies any history of COPD with his been using?nebulizer treatments?twice a day for the past week. ?On arrival to the ED he was tachycardic, tachypneic and requiring supplemental oxygen.? Chest x-ray showed increased interstitial markings.? There are bilateral groundglass opacities and enlarged mediastinal lymph nodes seen on CTA.? Initial laboratory work-up was significant for elevated BNP.? Diuresis was initiated in the ED and he was admitted to the hospital service with CIS consult.? Patient qualifies for COVID19 testing, results are pending and he is on isolation precautions.  Review of Systems  Except as documented all systems reviewed and negative  Physical Exam  Vitals & Measurements  T:?36.5? ?C (Temporal Artery)? HR:?103(Peripheral)? HR:?103(Monitored)? RR:?27? BP:?160/87? SpO2:?97%?  General:?NAD, nontoxic appearing  Eye: PERRLA, clear conjunctiva  HENT:?moist mucus membranes, normocephalic  Neck: full range of motion, no lymphadenopathy  Respiratory:?Diminished breath sounds on the right with inspiratory crackles,?and expiratory wheezes?left lung fields,?tachypneic with?mild  sternocleidomastoid use, saturating 98%?on 2 L nasal cannula  Cardiovascular:?regular rate and rhythm without murmurs, gallops or rubs  Gastrointestinal:?soft, non-tender, non-distended, active bowel sounds  Genitourinary: no CVA tenderness to palpation  Musculoskeletal:?full range of motion of all extremities  Integumentary: no rashes or skin lesions present  Neurologic: cranial nerves intact, no signs of peripheral neurological deficit, motor/sensory function intact  ?   Labs?(Last four charted values)  WBC ?????10.2???(APR 01)  Hgb ?????L?12.7???(APR 01)  Hct ?????L?40.9???(APR 01)  Plt ?????307???(APR 01)  Na ?????137???(APR 01)  K ?????4.8???(APR 01)  CO2 ?????L?22???(APR 01)  Cl ?????106???(APR 01)  Cr ?????1.14???(APR 01)  BUN ?????18.0???(APR 01)  Glucose Random ???????H?131???(APR 01)  PT ?????H?14.1???(APR 01)  INR ?????1.1???(APR 01)  PTT ?????H?37.0???(APR 01)  ?   Accession:?[id number]  Order:?CT Thorax W/O Contrast  Report Dt/Tm:?04/01/2020 12:52  Report:?  ?  ?  CLINICAL: ?Dyspnea  ?  TECHNIQUE: ?Multidetector axial images were performed from thoracic  inlet to below hemidiaphragms without intravenous contrast  administration. ?Sagittal and coronal reformations performed.  ?  Dose length product of 476 mGycm. Automated exposure control was  utilized to minimize radiation dose.  ?  COMPARISON: CT chest November 13, 2019. Chest radiograph April 1, 2020  ?  FINDINGS: ?Cardiac chambers enlargement is stable. There is  pericardial effusion which is mostly confined onto the right side with  maximum thickness of 2.5 cm. Bilateral lungs are remarkable for  moderate groundglass attenuations which are nonspecific though in the  setting of cardiomegaly congestive heart failure is favored. There are  bilateral large pleural effusions with 9.2 cm  depth on the right and 6.0 cm depth on the left. These cause  atelectasis of the lower lung zones. Right lower lung lobe 9 mm nodule  on image 36 series 4 remain  stable.  ?  There are new enlarged mediastinal lymph nodes. For example  aorticopulmonary window tiera complex measures 2.6 x 2.0 cm on image  21 and aorticopulmonary window 1.5 x 1.8 cm on image 20 series 3.  Calcified plaques of the thoracic aorta. Maximum diameter of the  ascending aorta is 3.4 cm.  ?  Adrenals are not enlarged. There are degenerative changes of the  thoracic spine. No acute or aggressive skeletal abnormality.  ?  IMPRESSION:?  ?  1. ?Bilateral lungs groundglass opacities likely represent pulmonary  edema in the setting of cardiomegaly.  2. ?Pericardial effusion and bilateral pleural effusions.  3. ?Nonspecific enlarged mediastinal lymph nodes.  ?  Accession:?[id number]  Order:?XR Chest 1 View  Report Dt/Tm:?04/01/2020 11:24  Report:?  one view of the chest  ?  CPT 02345  ?  HISTORY:  Dyspnea  ?  FINDINGS:  Examination reveals mediastinal and cardiac silhouettes to be within  normal limits. Increased interstitial markings identified with  confluent airspace opacities in the left perihilar region and right  base early infiltrates cannot be completely excluded no other focal  consolidative changes identified  ?  IMPRESSION: Increase in interstitial markings throughout with  confluent airspace opacities in the left perihilar region and left  base and in the right base early infiltrates cannot be completely  excluded  ?  Medications (11) Active  Scheduled: (7)  aspirin 81 mg Chew tab ?81 mg 1 tab(s), Oral, Daily  atorvastatin 40 mg Tab ?80 mg 2 tab(s), Oral, Daily  carvedilol 12.5 mg Tab UD ?12.5 mg 1 tab(s), Oral, BID  furosemide 10 mg/ml Inj - 4mL ?40 mg 4 mL, IV Push, BID  lisinopril 5 mg Tab UD ?5 mg 1 tab(s), Oral, Daily  sertraline 50 mg Tab UD ?50 mg 1 tab(s), Oral, Daily  trazodone 50 mg Tab ?50 mg 1 tab(s), Oral, Once a day (at bedtime)  Continuous: (0)  PRN: (4)  acetaminophen 500 mg Tab ?1,000 mg 2 tab(s), Oral, q6hr  albuterol CFC free 90 mcg/inh Aero ?90 mcg 1 puff(s), INH,  q4hr  Nitroglycerin 0.4 mg TAB ?0.4 mg 1 tab(s), SL, q5min  ondansetron 2 mg/mL inj - 2mL ?4 mg 2 mL, IV Push, q4hr  Assessment/Plan  Acute decompensated?systolic heart failure?(EF 25%)  Acute?respiratory failure with hypoxia  Exertional dyspnea/orthopnea  Aortic valve stenosis-followed by Dr. Villagran  Peripheral vascular disease s/p intervention  Hypertension  Hyperlipidemia  Remote smoking history  ?  Plan:  ?  Awaiting COVID19 results, continue with isolation precautions  Afebrile, no leukocytosis,?will defer?use of antibiotics at this time  Continue?diuresis, IV Lasix?40 mg twice daily  Monitor Is and Os, daily weights, low-sodium diet  Maintain oxygen saturations >94%  Metered-dose inhaler?as needed  Resume medications as appropriate,?hold ACE inhibitor  100% on 2 L nasal cannula,?is tachypneic?with some increased work of breathing, will closely monitor  No active chest pain, no EKG changes  Telemetry monitor  ?  I, Goyo Hendrix PA-C, have seen and discussed this patients case with Dr.?Abraham OSBORNE  Please see addendum section and the rest of the note for further information on patient assessment and treatment  ?  ?   ????I, Dr. Nancie Alfaro, assumed care of this patient at?around 1600  ????For this encounter I have reviewed the NPs documentation, treatment plan, and medical decision making. I had face to face time with the patient.?  ?????  ????HPI:?74-year-old male with significant history of valvular heart disease-aortic stenosis, CAD, subclavian steal syndrome, HTN, COPD.? Patient is being evaluated by CV surgery for aortic valve replacement.? However was deemed not a candidate because of his low ejection fraction.? Patient also was found to have a multiple lung nodular lesion for which she was evaluated by pulmonology and had recommended repeat CT.? Patient was not interested in biopsy.? Most recent echocardiogram consistent with severe aortic stenosis.? Patient today presented to the ED with complaints of  worsening shortness of breath over the past few days.? More worsening with exertion.? No fever, chills, sick contacts, myalgia.? Also complains of chest tightness for the same duration.? Upon initial evaluation in the ED patient was tachypneic and tachycardic.? Labs with significantly elevated BNP.? Otherwise unremarkable.? No leukocytosis.? CT thorax also obtained which was consistent with bilateral lungs: Close opacity likely pulmonary edema in the setting of cardiomegaly.? Patient also has pericardial effusion and bilateral pleural effusions.? Also with associated mediastinal adenopathy.? Hospitalist team consulted for admission and cardiology also was consulted.  ????Physical Exam:?As noted above  ????Medical Decision making:?  ?  Acute on chronic systolic heart failure with ejection fraction-20-25%  Acute pulmonary edema  Severe aortic stenosis  Bilateral groundglass opacities  Bilateral pulmonary nodules  Bilateral pleural effusion  Pericardial effusion  Mild atypical chest pain  Suspected C 19 pneumonitis  Acute hypoxemic respiratory failure secondary to pulmonary edema  Peripheral vascular disease  Essential HTN-stable  HLD  History of tobacco use  COPD with no acute exacerbation  History of CAD  Subclavian steal syndrome  Prophylaxis  ?  Supplemental oxygen to maintain saturations more than 92%  IV diuresis-Lasix 40 mg twice a day  Strict Is and Os  Continue Coreg and lisinopril  Repeat echocardiogram stat given pericardial effusion noted in CT thorax  Follow-up results  Cardiology consulted  Cycle troponins given mild atypical chest pain  Patient also has bilateral pleural effusions  Could be secondary to decompensated heart failure.? Repeat chest x-ray after diuresis  Patient has severe aortic stenosis, valve replacement planning in progress.? However challenging given his significantly reduced ejection fraction  Patient followed up with pulmonology recently for his bilateral lung nodules, recommended  repeat CT in 3 months  Findings in CT today most likely secondary to pulmonary edema and less likely infectious  Patient does not exhibit any features of pneumonitis.? No fever.  He was tested for C19 given CT findings and shortness of breath, less likely.? ?1 is negative, awaiting 2nd test results  Continue isolation precautions until then  Continue home medications-Zoloft, aspirin, trazodone, statin  Inhaled bronchodilators as needed  Bilateral SCDs for prophylaxis  ?  Critical care time-45 minutes  Critical care diagnosis-Acute Pulm edema needing iv lasix  Critical care?interventions: Hands-on evaluation, review of labs/radiographs/records and discussions with patient   Problem List/Past Medical History  Ongoing  Aortic stenosis, Mild  Aortic valve disorder  Chest pain(  Confirmed  )  Congestive heart failure(  Confirmed  )  Constipation  HLD (hyperlipidemia)(  Confirmed  )  HTN (hypertension)  HTN (hypertension)(  Confirmed  )  Insomnia  Joint pain  PVD (peripheral vascular disease)  PVD (peripheral vascular disease)(  Confirmed  )  Well adult exam  Historical  Cardiac ejection fraction 61%  Procedure/Surgical History  Fluoroscopy of Multiple Coronary Arteries using Other Contrast (01/15/2020)  Measurement of Cardiac Sampling and Pressure, Left Heart, Percutaneous Approach (01/15/2020)  Femoral-popliteal artery bypass graft with vein (01/01/2010)  Aorta to superior mesenteric artery bypass graft (01/01/2009)  Fluoroscopy and stent renal artery (01/01/2008)  CEA - Carotid endarterectomy (01/01/2006)   Medications  Inpatient  acetaminophen, 1000 mg= 2 tab(s), Oral, q6hr, PRN  aspirin 81 mg oral tablet, CHEWABLE, 81 mg= 1 tab(s), Oral, Daily  atorvastatin, 80 mg= 2 tab(s), Oral, Daily  Coreg 12.5 mg oral tablet, 12.5 mg= 1 tab(s), Oral, BID  Lasix, 40 mg= 4 mL, IV Push, BID  lisinopril 5 mg oral tablet, 5 mg= 1 tab(s), Oral, Daily  Nitrostat 0.4 mg sublingual tab, 0.4 mg= 1 tab(s), SL, q5min,  PRN  sertraline 50 mg oral tablet, 50 mg= 1 tab(s), Oral, Daily  traZODONE 50 mg oral tablet ( Desyrel ), 50 mg= 1 tab(s), Oral, Once a day (at bedtime)  Triple Antibiotic Ointment topical, 1 dose(s), TOP, Once  Ventolin HFA 90 mcg/inh inhalation aerosol, 90 mcg= 1 puff(s), INH, q4hr, PRN  Zofran, 4 mg= 2 mL, IV Push, q4hr, PRN  Home  aspirin 81 mg oral tablet, CHEWABLE, 81 mg= 1 tab(s), Oral, Daily, 6 refills  atorvastatin 80 mg oral tablet, 80 mg= 1 tab(s), Oral, Daily, 2 refills  carvedilol 3.125 mg oral tablet, 3.125 mg= 1 tab(s), Oral, BID,? ?Not Taking per Prescriber  carvedilol 6.25 mg oral tablet, 6.25 mg= 1 tab(s), Oral, BID,? ?Not Taking per Prescriber  Coreg 12.5 mg oral tablet, 12.5 mg= 1 tab(s), Oral, BID, 3 refills  diclofenac 1% topical gel, 2 gm, TOP, QID, PRN, 5 refills  DuoNeb 0.5 mg-2.5 mg/3 mL inhalation solution, 3 mL, INH, QID, PRN, 3 refills  Flonase 50 mcg/inh nasal spray, 1 spray(s), Nasal, BID, PRN, 3 refills  gabapentin 300 mg oral capsule, 300 mg= 1 cap(s), Oral, At Bedtime, 3 refills,? ?Not Taking per Prescriber  lactulose 10 g/15 mL oral syrup, 20 gm= 30 mL, Oral, TID, 3 refills,? ?Not Taking per Prescriber  Lasix 20 mg oral tablet, 20 mg= 1 tab(s), Oral, Daily, PRN, 3 refills  levofloxacin 750 mg oral tablet, 750 mg= 1 tab(s), Oral, Daily,? ?Not Taking, Completed Rx  lisinopril 2.5 mg oral tablet, 2.5 mg= 1 tab(s), Oral, Daily,? ?Not Taking per Prescriber  lisinopril 5 mg oral tablet, 5 mg= 1 tab(s), Oral, Daily, 3 refills  multivitamin with minerals (Adult Tab), 1 tab(s), Oral, Daily  Nitrostat 0.4 mg sublingual tab, 0.4 mg= 1 tab(s), SL, q5min, PRN, 5 refills  omeprazole 40 mg oral DR capsule, 40 mg= 1 cap(s), Oral, Daily  Osteo Bi-Flex 250 mg-200 mg oral tablet, 1 tab, Oral, Daily  sertraline 50 mg oral tablet, 50 mg= 1 tab(s), Oral, Daily, 3 refills  traZODONE 50 mg oral tablet ( Desyrel ), 50 mg= 1 tab(s), Oral, Once a day (at bedtime), 3 refills  Vitamin C 250 mg oral tablet, 1  tab, Oral, Daily  Allergies  penicillin G benzathine  Social History  Abuse/Neglect  No, 01/22/2020  No, 01/10/2020  Alcohol - Denies Alcohol Use, 09/11/2014  Past, 08/11/2016  Employment/School  Retired, Highest education level: High school., 04/10/2015  Exercise - Does not exercise, 03/06/2015  Exercise duration: 60. Exercise frequency: 3-4 times/week. Self assessment: Good condition. Exercise type: Walking, Weight lifting., 12/11/2018  Home/Environment  Lives with Spouse. Living situation: Home/Independent. Alcohol abuse in household: No. Substance abuse in household: No. Smoker in household: Yes. Feels unsafe at home: No. Safe place to go: Yes. Family/Friends available for support: Yes., 04/10/2015  Nutrition/Health  Regular, Wants to lose weight: No., 12/11/2018  Regular, Caffeine intake amount: 2 coke zero per day., 02/15/2017  Sexual  Sexual orientation: Straight or heterosexual. Gender Identity Identifies as male., 01/30/2019  Substance Use - Denies Substance Abuse, 09/11/2014  Never, 08/11/2016  Tobacco - Denies Tobacco Use, 09/11/2014  Former smoker, quit more than 30 days ago, N/A, 01/22/2020  Former smoker, quit more than 30 days ago, N/A, 01/20/2020  Family History  CVA (cerebral infarction): Father.  Parkinson disease.: Mother.  Immunizations  Vaccine Date Status Comments   zoster vaccine, inactivated 01/07/2020 Given    pneumococcal 13-valent conjugate vaccine 06/10/2019 Given Pt raquel well. No adverse reaction noted at d/c. Consent signed and VIS given.   influenza virus vaccine, inactivated 09/27/2017 Recorded [facility]   pneumococcal 23-polyvalent vaccine 02/15/2017 Given    tetanus/diphtheria/pertussis, acel(Tdap) 09/11/2014 Given other (see comment)   influenza virus vaccine, inactivated 10/20/2013 Recorded    influenza virus vaccine, inactivated 10/20/2013 Recorded

## 2022-05-10 ENCOUNTER — OFFICE VISIT (OUTPATIENT)
Dept: VASCULAR SURGERY | Facility: CLINIC | Age: 77
End: 2022-05-10
Payer: COMMERCIAL

## 2022-05-10 VITALS
BODY MASS INDEX: 24.32 KG/M2 | SYSTOLIC BLOOD PRESSURE: 95 MMHG | WEIGHT: 160.5 LBS | DIASTOLIC BLOOD PRESSURE: 65 MMHG | TEMPERATURE: 98 F | OXYGEN SATURATION: 98 % | HEIGHT: 68 IN | HEART RATE: 60 BPM | RESPIRATION RATE: 20 BRPM

## 2022-05-10 DIAGNOSIS — I70.1 RENAL ARTERY STENOSIS: Primary | ICD-10-CM

## 2022-05-10 PROCEDURE — 99215 OFFICE O/P EST HI 40 MIN: CPT | Mod: PBBFAC

## 2022-05-10 RX ORDER — AMLODIPINE BESYLATE 10 MG/1
10 TABLET ORAL NIGHTLY
COMMUNITY
Start: 2022-02-14

## 2022-05-10 RX ORDER — SERTRALINE HYDROCHLORIDE 50 MG/1
50 TABLET, FILM COATED ORAL DAILY
COMMUNITY
Start: 2021-10-26 | End: 2022-06-30 | Stop reason: SDUPTHER

## 2022-05-10 RX ORDER — LISINOPRIL 40 MG/1
40 TABLET ORAL DAILY
COMMUNITY
Start: 2022-04-19 | End: 2024-02-05

## 2022-05-10 RX ORDER — HYDRALAZINE HYDROCHLORIDE 50 MG/1
50 TABLET, FILM COATED ORAL
COMMUNITY
Start: 2022-01-20 | End: 2022-06-30 | Stop reason: SDUPTHER

## 2022-05-10 RX ORDER — EZETIMIBE 10 MG/1
10 TABLET ORAL NIGHTLY
COMMUNITY
Start: 2022-04-18

## 2022-05-10 RX ORDER — NIFEDIPINE 60 MG/1
60 TABLET, EXTENDED RELEASE ORAL DAILY
COMMUNITY
Start: 2022-04-28 | End: 2024-02-05

## 2022-05-10 RX ORDER — TRAZODONE HYDROCHLORIDE 100 MG/1
100 TABLET ORAL NIGHTLY
COMMUNITY
Start: 2021-11-30 | End: 2022-07-19 | Stop reason: SDUPTHER

## 2022-05-10 RX ORDER — ATORVASTATIN CALCIUM 80 MG/1
80 TABLET, FILM COATED ORAL NIGHTLY
COMMUNITY
Start: 2022-01-05

## 2022-05-10 RX ORDER — OMEPRAZOLE 40 MG/1
40 CAPSULE, DELAYED RELEASE ORAL
COMMUNITY
End: 2024-02-05

## 2022-05-10 RX ORDER — CLONIDINE HYDROCHLORIDE 0.1 MG/1
0.1 TABLET ORAL 2 TIMES DAILY
COMMUNITY
Start: 2022-02-14

## 2022-05-10 RX ORDER — CLOPIDOGREL BISULFATE 75 MG/1
75 TABLET ORAL DAILY
COMMUNITY
Start: 2022-05-10

## 2022-05-10 RX ORDER — MULTIVITAMIN
1 TABLET ORAL DAILY
COMMUNITY

## 2022-05-10 RX ORDER — FLUTICASONE PROPIONATE 50 MCG
1 SPRAY, SUSPENSION (ML) NASAL DAILY
COMMUNITY
End: 2024-02-05

## 2022-05-10 RX ORDER — CARVEDILOL 25 MG/1
25 TABLET ORAL DAILY
COMMUNITY
Start: 2022-04-18

## 2022-05-10 RX ORDER — ASCORBIC ACID 250 MG
250 TABLET ORAL DAILY
COMMUNITY

## 2022-05-10 NOTE — PROGRESS NOTES
Patient seen by Dr. Ramirez and Dr. Spencer. RTC 6 months with carotid US, abdominal aortic US, and bilateral arterial AKANKSHA. Discharge instructions given verbal and written.

## 2022-05-10 NOTE — PROGRESS NOTES
[facility] Vascular surgery Clinic note    CC:   Justyn Perez is a 76 y.o. male presenting with concerns for renal artery stenosis     HPI:   Mr perez has a PMHx of HLD, pulm edema, severe aortic stenosis, HFrEF, HTN was found to have renal artery stenosis on AAA U/S. He expressed that he has previously had 6 stents placed (3 in his groin, 1 shoulder, 1 carotid, 1 in R renal artery). He has also had AA bypass surgery 12-15 years prior.     PMH:   Past Medical History:   Diagnosis Date    Aortic stenosis     Coronary artery disease     Disorder of kidney and ureter     Hyperlipidemia     Hypertension        PSH:   Past Surgical History:   Procedure Laterality Date    CARDIAC VALVE REPLACEMENT      CAROTID STENT         FamHx:   Family History   Problem Relation Age of Onset    Parkinsonism Mother     Stroke Father     No Known Problems Sister     No Known Problems Brother     No Known Problems Daughter     No Known Problems Son        SocHx:  Social History     Socioeconomic History    Marital status:    Tobacco Use    Smoking status: Never Smoker    Smokeless tobacco: Never Used   Substance and Sexual Activity    Alcohol use: Not Currently    Drug use: Never       Allergies:   Review of patient's allergies indicates:   Allergen Reactions    Penicillins Rash       Medications:  Current Outpatient Medications on File Prior to Visit   Medication Sig Dispense Refill    amLODIPine (NORVASC) 10 MG tablet Take 10 mg by mouth.      ascorbic acid, vitamin C, (VITAMIN C) 250 MG tablet Take 250 mg by mouth.      atorvastatin (LIPITOR) 80 MG tablet Take 80 mg by mouth.      carvediloL (COREG) 25 MG tablet Take 25 mg by mouth once daily at 6am.      cloNIDine (CATAPRES) 0.1 MG tablet Take 0.1 mg by mouth.      clopidogreL (PLAVIX) 75 mg tablet Take 75 mg by mouth once daily at 6am.      ezetimibe (ZETIA) 10 mg tablet Take 10 mg by mouth once daily at 6am.      fluticasone propionate  (FLONASE) 50 mcg/actuation nasal spray 1 spray by Nasal route.      hydrALAZINE (APRESOLINE) 50 MG tablet Take 50 mg by mouth.      lisinopriL (PRINIVIL,ZESTRIL) 40 MG tablet Take 40 mg by mouth once daily at 6am.      multivitamin with folic acid 400 mcg Tab Take 1 tablet by mouth once daily.      NIFEdipine (PROCARDIA-XL) 60 MG (OSM) 24 hr tablet Take 60 mg by mouth once daily at 6am.      omeprazole (PRILOSEC) 40 MG capsule Take 40 mg by mouth.      sertraline (ZOLOFT) 50 MG tablet Take 50 mg by mouth.      traZODone (DESYREL) 100 MG tablet Take 100 mg by mouth.       No current facility-administered medications on file prior to visit.         ROS:   Gen: Denies any fatigue, fever, or chills  Skin: No new rashes, noted ecchymosis   Head: No new headaches  Cardiac: Denies any orthopnea or palpitations  Resp: Denies any cough, wheezing, or shortness of breath  GI: No N/V, changes in bowel movments   Urinary: Denies any pain or difficulty urinating  Neuro: No pain or tingling in extremities.  No new onset weakness.      Objective:  Physical Exam:  GENERAL: NAD, resting comfortably in stretcher  NEURO: awake, alert, oriented x3  HEENT: Atraumatic, no scleral icterus  NECK: trachea midline, no masses  CARDIO: regular rate, regular rhythm, no murmurs or rubs  CHEST: Non-labored breathing, good resp effort, CTAB  ABD: no rebound tenderness or guarding, healed midline scar, no renal bruits palpable  /RECTAL: rectal exam deferred  SKIN: no rashes, no lesions      Imaging:  U/S Abdominal Aorta- 11/16/21    IMPRESSION  Overall limited sonographic assessment secondary to technical factors  discussed above. Within limitations:       1. No definite evidence of abdominal aortic aneurysm or focal  intraluminal abnormality.    2. No findings to suggest occlusive vascular disease.    3. Elevated distal left renal artery velocity suggestive of  hemodynamically significant stenosis.    4. Mildly abnormal bilateral  AKANKSHA.    A/P:   76 y.o. male with PMHx of HLD, pulm edema, severe aortic stenosis, HFrEF, HTN was found to have renal artery stenosis on AAA U/S for consideration of stent placement.  - In regards to his Left sided renal art stenosis, vascular surgery does not think that it requires intervention at this time. He is currently controlled on max blood pressure medication.   - he may need angiography in the future but would like to hold off in an effort to not damage kidneys any more.   - We will see him back in 6 months with BL lower extrem US and AKANKSHA, BL carotid US, and AAA US.      Talia Rodriguez, MS3  [facility]

## 2022-06-30 ENCOUNTER — CLINICAL SUPPORT (OUTPATIENT)
Dept: INTERNAL MEDICINE | Facility: CLINIC | Age: 77
End: 2022-06-30
Payer: COMMERCIAL

## 2022-06-30 DIAGNOSIS — I10 HYPERTENSION, UNSPECIFIED TYPE: Primary | ICD-10-CM

## 2022-06-30 RX ORDER — ALBUTEROL SULFATE 1.25 MG/3ML
1.25 SOLUTION RESPIRATORY (INHALATION) EVERY 6 HOURS PRN
Qty: 75 ML | Refills: 0 | Status: SHIPPED | OUTPATIENT
Start: 2022-06-30 | End: 2024-02-05

## 2022-06-30 RX ORDER — SERTRALINE HYDROCHLORIDE 50 MG/1
50 TABLET, FILM COATED ORAL DAILY
Qty: 60 TABLET | Refills: 2 | Status: SHIPPED | OUTPATIENT
Start: 2022-06-30 | End: 2022-11-29 | Stop reason: SDUPTHER

## 2022-06-30 RX ORDER — HYDRALAZINE HYDROCHLORIDE 50 MG/1
50 TABLET, FILM COATED ORAL DAILY
Qty: 60 TABLET | Refills: 3 | Status: SHIPPED | OUTPATIENT
Start: 2022-06-30 | End: 2022-08-29

## 2022-06-30 NOTE — PROGRESS NOTES
Established Patient - Audio Only Telehealth Visit     The patient location is: home  The chief complaint leading to consultation is: routine f/u  Visit type: Virtual visit with audio only (telephone)  Total time spent with patient: 30 minutes       The reason for the audio only service rather than synchronous audio and video virtual visit was related to technical difficulties or patient preference/necessity.     Each patient to whom I provide medical services by telemedicine is:  (1) informed of the relationship between the physician and patient and the respective role of any other health care provider with respect to management of the patient; and (2) notified that they may decline to receive medical services by telemedicine and may withdraw from such care at any time. Patient verbally consented to receive this service via voice-only telephone call.         [facility]  [address] [address]      PATIENT NAME: Justyn Perez  : 1945  DATE: 22  MRN: [id number]      Billing Provider: [facility], [facility]  Level of Service:   Patient PCP Information     Provider PCP Type    DO Naheed Flores          Reason for Visit / Chief Complaint: Follow-up (Denies any concerns at this time) and Medication Refill       History of Present Illness / Problem Focused Workflow     Justyn Sanchezxnayder presents to the clinic with Follow-up (Denies any concerns at this time) and Medication Refill     No acute complaints.  Pleasant manner.  Only requesting refills for hydralazine, sertraline and neb albuterol.      Review of Systems     10-point ROS performed and negative except as above.      Medical / Social / Family History     Past Medical History:   Diagnosis Date    Aortic stenosis     Coronary artery disease     Disorder of kidney and ureter     Hyperlipidemia     Hypertension        Past Surgical  History:   Procedure Laterality Date    CARDIAC VALVE REPLACEMENT      CAROTID STENT         Social History    reports that he has never smoked. He has never used smokeless tobacco. He reports previous alcohol use. He reports that he does not use drugs.    Family History  's family history includes No Known Problems in his brother, daughter, sister, and son; Parkinsonism in his mother; Stroke in his father.    Medications and Allergies     Medications  Outpatient Medications Marked as Taking for the 6/30/22 encounter (Clinical Support) with [facility], [facility]   Medication Sig Dispense Refill    amLODIPine (NORVASC) 10 MG tablet Take 10 mg by mouth once daily.      ascorbic acid, vitamin C, (VITAMIN C) 250 MG tablet Take 250 mg by mouth once daily.      atorvastatin (LIPITOR) 80 MG tablet Take 80 mg by mouth once daily.      carvediloL (COREG) 25 MG tablet Take 25 mg by mouth once daily at 6am.      clopidogreL (PLAVIX) 75 mg tablet Take 75 mg by mouth once daily at 6am.      ezetimibe (ZETIA) 10 mg tablet Take 10 mg by mouth once daily at 6am.      fluticasone propionate (FLONASE) 50 mcg/actuation nasal spray 1 spray by Nasal route Daily.      glucosamine/chondr sy A sod (OSTEO BI-FLEX ORAL) Take 1 tablet by mouth once daily at 6am.      lisinopriL (PRINIVIL,ZESTRIL) 40 MG tablet Take 40 mg by mouth once daily at 6am.      multivitamin with folic acid 400 mcg Tab Take 1 tablet by mouth once daily.      NIFEdipine (PROCARDIA-XL) 60 MG (OSM) 24 hr tablet Take 60 mg by mouth once daily at 6am.      sertraline (ZOLOFT) 50 MG tablet Take 50 mg by mouth once daily.      traZODone (DESYREL) 100 MG tablet Take 100 mg by mouth every evening.         Allergies  Review of patient's allergies indicates:   Allergen Reactions    Penicillins Rash       Physical Examination   There were no vitals filed for this visit.      Deferred due to audio only visit.        Results   Reviewed recent  labs.        Assessment and Plan (including Health Maintenance)     Left Renal Artery Stenosis   - noted incidentally on screening for AAA per AA u/s on 11/16/2021, currently being followed by [facility] surgery, no definitive answer whether patient will receive stenting     Right renal artery stenosis s/p right renal stent   - placed approx 10 years ago, sees nephrology clinic       Abnormal Bilateral AKANKSHA    PAD with hx of bilateral revascularization   - per screening for AAA on AA u/s on 11/16/2021, lower ext art u/s from march 2022 showed right AKANKSHA 0.81 with 50-75% stenosis and left AKANKSHA 0.86 with greater than 75% stenosis, bilateral abnormal ABIs, referral to [facility] vascular clinic sent with pending appointment may 10 2022 , unable to order ct angio with runoff due to ckd III     Critical Aortic stenosis/regurgitation s/p TAVR on 4/29/20    -continue follow up by Dr. Lin and Dr. Mares  - TTE 12/15/2021: EF 56%, severe LVH, left atrial enlargement, aortic bioprosthetic valve well seated, mild MS, mild TR, PASP elevated at 37-45     Subclavian steal syndrome, left s/p left subclavian arterial stent in Feb 2021  Carotid Artery Stenosis, s/p R carotid artery stent placed by Dr. Cm 1/29/21   -US NIVA done in Feb 2020 Which showed findings suggestive of moderate to moderately severe left arm arterial insufficiency with some degree of obstruction at the level of the proximal subclavian artery.   -Continue aspirin and Plavix      Carpal Tunnel s/p release in left hand and right hand   -Continue gabapentin 900 mg twice daily; hand surgeon Dr. Guy Esparza      HTN   -Currently taking Coreg 25mg BID. Lisinopril 40mg daily, nifedipine 60 , hydralazine to 50 BID, lasix prn     CKD Stage IIIA   -BREE, ANCA, neg   -RP ultrasound December 2020: Multiple simple appearing bilateral renal cortical cysts and parenchymal echogenicity of kidneys bilaterally  - following nephrology clinic; Avoid nephrotoxic agents     HLD    -Continue atorvastatin 80, Zetia 10     CAD/ PAD s/p fem pop bypass   Superior mesenteric artery stenosis   -continue follow up with cardiology, surgery clinic recommended no surgical intervention at this time   -continue asa, statin     Arthritis   -continue tylenol arthritis PRN; Continue follow-up with Ortho     Depressed mood vs. Major Depressive Disorder   -Continue Sertraline 50mg daily     Pre-DM   -A1c 6.1 as of 10/26/2022, recommended lifestyle mods, reorder a1c if still in pre-DM range or random BG elevated , will order metformin   -Eye exam in October 2019 showed no retinopathy, but +cataracts. Will be followed by ophtho      Insomnia   - cont Trazodone 100 mg     Health Maintenance/ Wellness   Flu vaccine: received 10/22/2021   Pneumococcal vaccine: UTD, has had prevnar and pneumovax  COVID vaccine: completed recent dose in 2/10/2021, booster received nov 2021  TDAP: UTD, done in 2014  Shingrix vaccine: UTD  AAA U/S screening: negative for AAA, done 11/16/2021  Screening colonoscopy: FIT negative 11/2/2021  Lung Cancer Screening: CT from 11/22/2021 Lung RADS 2, benign, repeat in 12 months    Hepatitis Panel: Negative in 2015          Health Maintenance Due   Topic Date Due    Hepatitis C Screening  Never done    TETANUS VACCINE  Never done    COVID-19 Vaccine (4 - Booster for Pfizer series) 03/30/2022         Health Maintenance Topics with due status: Not Due       Topic Last Completion Date    Lipid Panel 10/26/2021       No follow-ups on file.     Future Appointments   Date Time Provider Department Center   8/4/2022 10:00 AM VICK [facility]   11/15/2022  1:00 PM PROVIDER, [facility] VASCULAR SURGERY [facility] K[facility]            Signature:  Jay Martínez DO  [facility]  [address] [address]    Date of encounter: 6/30/22                       This service was not originating from a related  E/M service provided within the previous 7 days nor will  to an E/M service or procedure within the next 24 hours or my soonest available appointment.  Prevailing standard of care was able to be met in this audio-only visit.

## 2022-07-19 RX ORDER — TRAZODONE HYDROCHLORIDE 100 MG/1
100 TABLET ORAL NIGHTLY
Qty: 30 TABLET | Refills: 0 | Status: SHIPPED | OUTPATIENT
Start: 2022-07-19 | End: 2022-08-30 | Stop reason: SDUPTHER

## 2022-07-28 ENCOUNTER — TELEPHONE (OUTPATIENT)
Dept: NEPHROLOGY | Facility: CLINIC | Age: 77
End: 2022-07-28
Payer: COMMERCIAL

## 2022-07-28 NOTE — TELEPHONE ENCOUNTER
----- Message from Eboni Herrera sent at 7/28/2022  8:20 AM CDT -----  Regarding: Labs  Patient needs labs sent to [facility]  Address -- [address]   Patients appointment is for August 4th     Thank You ,   Eboni REARDON

## 2022-08-03 PROBLEM — N18.9 CHRONIC KIDNEY DISEASE: Status: ACTIVE | Noted: 2022-08-03

## 2022-08-03 PROBLEM — R91.1 LUNG NODULE: Status: ACTIVE | Noted: 2022-08-03

## 2022-08-03 PROBLEM — G47.00 INSOMNIA: Status: ACTIVE | Noted: 2022-08-03

## 2022-08-03 PROBLEM — I35.0 NONRHEUMATIC AORTIC VALVE STENOSIS: Status: ACTIVE | Noted: 2022-08-03

## 2022-08-03 PROBLEM — I42.9 CARDIOMYOPATHY: Status: ACTIVE | Noted: 2022-08-03

## 2022-08-03 PROBLEM — K59.00 CONSTIPATION: Status: ACTIVE | Noted: 2022-08-03

## 2022-08-03 PROBLEM — R07.9 CHEST PAIN: Status: ACTIVE | Noted: 2022-08-03

## 2022-08-03 PROBLEM — I50.9 CONGESTIVE HEART FAILURE: Status: ACTIVE | Noted: 2022-08-03

## 2022-08-03 PROBLEM — I73.9 PERIPHERAL VASCULAR DISEASE: Status: ACTIVE | Noted: 2022-08-03

## 2022-08-03 PROBLEM — I77.1 STENOSIS OF SUBCLAVIAN ARTERY: Status: ACTIVE | Noted: 2022-08-03

## 2022-08-03 PROBLEM — I35.9 AORTIC VALVE DISORDER: Status: ACTIVE | Noted: 2022-08-03

## 2022-08-03 PROBLEM — E78.5 HYPERLIPIDEMIA: Status: ACTIVE | Noted: 2022-08-03

## 2022-08-03 PROBLEM — R06.02 SHORTNESS OF BREATH: Status: ACTIVE | Noted: 2022-08-03

## 2022-08-03 PROBLEM — G62.9 NEUROPATHY: Status: ACTIVE | Noted: 2022-08-03

## 2022-08-03 PROBLEM — M25.50 ARTHRALGIA: Status: ACTIVE | Noted: 2022-08-03

## 2022-08-03 PROBLEM — I65.29 STENOSIS OF CAROTID ARTERY: Status: ACTIVE | Noted: 2022-08-03

## 2022-08-03 PROBLEM — G56.00 CARPAL TUNNEL SYNDROME: Status: ACTIVE | Noted: 2022-08-03

## 2022-08-03 PROBLEM — I25.10 ARTERIOSCLEROSIS OF CORONARY ARTERY: Status: ACTIVE | Noted: 2022-08-03

## 2022-08-04 ENCOUNTER — OFFICE VISIT (OUTPATIENT)
Dept: NEPHROLOGY | Facility: CLINIC | Age: 77
End: 2022-08-04
Payer: COMMERCIAL

## 2022-08-04 VITALS
HEIGHT: 67 IN | OXYGEN SATURATION: 98 % | SYSTOLIC BLOOD PRESSURE: 158 MMHG | TEMPERATURE: 98 F | DIASTOLIC BLOOD PRESSURE: 68 MMHG | BODY MASS INDEX: 25.51 KG/M2 | HEART RATE: 60 BPM | WEIGHT: 162.5 LBS | RESPIRATION RATE: 20 BRPM

## 2022-08-04 DIAGNOSIS — N18.31 CKD STAGE G3A/A3, GFR 45-59 AND ALBUMIN CREATININE RATIO >300 MG/G: Primary | ICD-10-CM

## 2022-08-04 DIAGNOSIS — D63.8 ANEMIA OF CHRONIC DISEASE: ICD-10-CM

## 2022-08-04 DIAGNOSIS — I10 PRIMARY HYPERTENSION: ICD-10-CM

## 2022-08-04 PROCEDURE — 1160F RVW MEDS BY RX/DR IN RCRD: CPT | Mod: CPTII,,, | Performed by: NURSE PRACTITIONER

## 2022-08-04 PROCEDURE — 1159F PR MEDICATION LIST DOCUMENTED IN MEDICAL RECORD: ICD-10-PCS | Mod: CPTII,,, | Performed by: NURSE PRACTITIONER

## 2022-08-04 PROCEDURE — 3288F PR FALLS RISK ASSESSMENT DOCUMENTED: ICD-10-PCS | Mod: CPTII,,, | Performed by: NURSE PRACTITIONER

## 2022-08-04 PROCEDURE — 3288F FALL RISK ASSESSMENT DOCD: CPT | Mod: CPTII,,, | Performed by: NURSE PRACTITIONER

## 2022-08-04 PROCEDURE — 1126F PR PAIN SEVERITY QUANTIFIED, NO PAIN PRESENT: ICD-10-PCS | Mod: CPTII,,, | Performed by: NURSE PRACTITIONER

## 2022-08-04 PROCEDURE — 99214 OFFICE O/P EST MOD 30 MIN: CPT | Mod: S$PBB,,, | Performed by: NURSE PRACTITIONER

## 2022-08-04 PROCEDURE — 1159F MED LIST DOCD IN RCRD: CPT | Mod: CPTII,,, | Performed by: NURSE PRACTITIONER

## 2022-08-04 PROCEDURE — 3078F DIAST BP <80 MM HG: CPT | Mod: CPTII,,, | Performed by: NURSE PRACTITIONER

## 2022-08-04 PROCEDURE — 99214 PR OFFICE/OUTPT VISIT, EST, LEVL IV, 30-39 MIN: ICD-10-PCS | Mod: S$PBB,,, | Performed by: NURSE PRACTITIONER

## 2022-08-04 PROCEDURE — 3077F PR MOST RECENT SYSTOLIC BLOOD PRESSURE >= 140 MM HG: ICD-10-PCS | Mod: CPTII,,, | Performed by: NURSE PRACTITIONER

## 2022-08-04 PROCEDURE — 1101F PR PT FALLS ASSESS DOC 0-1 FALLS W/OUT INJ PAST YR: ICD-10-PCS | Mod: CPTII,,, | Performed by: NURSE PRACTITIONER

## 2022-08-04 PROCEDURE — 1126F AMNT PAIN NOTED NONE PRSNT: CPT | Mod: CPTII,,, | Performed by: NURSE PRACTITIONER

## 2022-08-04 PROCEDURE — 3078F PR MOST RECENT DIASTOLIC BLOOD PRESSURE < 80 MM HG: ICD-10-PCS | Mod: CPTII,,, | Performed by: NURSE PRACTITIONER

## 2022-08-04 PROCEDURE — 99215 OFFICE O/P EST HI 40 MIN: CPT | Mod: PBBFAC | Performed by: NURSE PRACTITIONER

## 2022-08-04 PROCEDURE — 1101F PT FALLS ASSESS-DOCD LE1/YR: CPT | Mod: CPTII,,, | Performed by: NURSE PRACTITIONER

## 2022-08-04 PROCEDURE — 3077F SYST BP >= 140 MM HG: CPT | Mod: CPTII,,, | Performed by: NURSE PRACTITIONER

## 2022-08-04 PROCEDURE — 1160F PR REVIEW ALL MEDS BY PRESCRIBER/CLIN PHARMACIST DOCUMENTED: ICD-10-PCS | Mod: CPTII,,, | Performed by: NURSE PRACTITIONER

## 2022-08-04 RX ORDER — FERROUS SULFATE 325(65) MG
325 TABLET ORAL EVERY OTHER DAY
Qty: 45 TABLET | Refills: 1 | Status: SHIPPED | OUTPATIENT
Start: 2022-08-04 | End: 2023-01-31

## 2022-08-04 NOTE — PROGRESS NOTES
"[facility]  Nephrology Clinic Note   Chief Complaint   Patient presents with    Chronic Kidney Disease     Follow up      History of Present Illness  Mr Justyn Perez is a 76 y.o. White male with past medical history of chronic kidney disease, IGT, coronary artery disease, hypertension, dyslipidemia, aortic stenosis (S/p TAVR in April 2002), peripheral vascular disease (right renal artery stenosis, s/p stenting, carotid artery stenosis, s/p stenting), carpal tunnel syndrome. Presents for follow up appointment in nephrology clinic today, denies complaints.     Review of Systems  Twelve point review of systems conducted, negative except as stated in history of present illness.    Review of patient's allergies indicates:   Allergen Reactions    Penicillins Rash       Past Medical History:   Past Medical History:   Diagnosis Date    Aortic stenosis     Carpal tunnel syndrome     CKD (chronic kidney disease)     Coronary artery disease     Disorder of kidney and ureter     Dyslipidemia     Hyperlipidemia     Hypertension     IGT (impaired glucose tolerance)     PVD (peripheral vascular disease)        Procedure History:   Past Surgical History:   Procedure Laterality Date    CARDIAC VALVE REPLACEMENT      CAROTID STENT      femoral-popliteal artery bypass      Renal artery angiogram      TAVR-TF  04/29/2020    TONSILLECTOMY         Family History: family history includes No Known Problems in his brother, daughter, sister, and son; Parkinsonism in his mother; Stroke in his father.    Social History:  reports that he has never smoked. He has never used smokeless tobacco. He reports previous alcohol use. He reports that he does not use drugs.    Physical Exam:   BP (!) 158/68 (BP Location: Right arm, Patient Position: Sitting, BP Method: Medium (Manual))   Pulse 60   Temp 97.8 °F (36.6 °C) (Oral)   Resp 20   Ht 5' 7" (1.702 m)   Wt 73.7 kg (162 lb 7.7 oz)   SpO2 " 98%   BMI 25.45 kg/m²     General appearance: Patient is in no acute distress.  Skin: No rashes or wounds.  HEENT: PERRLA, EOMI, no scleral icterus, no JVD. Neck is supple.  Chest: Respirations are unlabored. Lungs sounds are clear.   Heart: S1, S2.   Abdomen: Benign.  : Deferred.  Extremities: No edema, peripheral pulses are palpable.   Neuro: No focal deficits.     Home Medications:    Current Outpatient Medications:     albuterol (ACCUNEB) 1.25 mg/3 mL Nebu, Take 3 mLs (1.25 mg total) by nebulization every 6 (six) hours as needed. Rescue, Disp: 75 mL, Rfl: 0    amLODIPine (NORVASC) 10 MG tablet, Take 10 mg by mouth once daily., Disp: , Rfl:     ascorbic acid, vitamin C, (VITAMIN C) 250 MG tablet, Take 250 mg by mouth once daily., Disp: , Rfl:     atorvastatin (LIPITOR) 80 MG tablet, Take 80 mg by mouth once daily., Disp: , Rfl:     carvediloL (COREG) 25 MG tablet, Take 25 mg by mouth once daily at 6am., Disp: , Rfl:     cloNIDine (CATAPRES) 0.1 MG tablet, Take 0.1 mg by mouth., Disp: , Rfl:     clopidogreL (PLAVIX) 75 mg tablet, Take 75 mg by mouth once daily at 6am., Disp: , Rfl:     ezetimibe (ZETIA) 10 mg tablet, Take 10 mg by mouth once daily at 6am., Disp: , Rfl:     fluticasone propionate (FLONASE) 50 mcg/actuation nasal spray, 1 spray by Nasal route Daily., Disp: , Rfl:     glucosamine/chondr sy A sod (OSTEO BI-FLEX ORAL), Take 1 tablet by mouth once daily at 6am., Disp: , Rfl:     hydrALAZINE (APRESOLINE) 50 MG tablet, Take 1 tablet (50 mg total) by mouth once daily at 6am. for 60 doses, Disp: 60 tablet, Rfl: 3    lisinopriL (PRINIVIL,ZESTRIL) 40 MG tablet, Take 40 mg by mouth once daily at 6am., Disp: , Rfl:     multivitamin with folic acid 400 mcg Tab, Take 1 tablet by mouth once daily., Disp: , Rfl:     NIFEdipine (PROCARDIA-XL) 60 MG (OSM) 24 hr tablet, Take 60 mg by mouth once daily at 6am., Disp: , Rfl:     omeprazole (PRILOSEC) 40 MG capsule, Take 40 mg by mouth., Disp: , Rfl:      sertraline (ZOLOFT) 50 MG tablet, Take 1 tablet (50 mg total) by mouth once daily., Disp: 60 tablet, Rfl: 2    traZODone (DESYREL) 100 MG tablet, Take 1 tablet (100 mg total) by mouth every evening., Disp: 30 tablet, Rfl: 0    ferrous sulfate (IRON, FERROUS SULFATE,) 325 mg (65 mg iron) Tab tablet, Take 1 tablet (325 mg total) by mouth every other day., Disp: 45 tablet, Rfl: 1     Laboratory Data:   Hgb 9.9 Hct 29.4, UA unremarkable except for mild proteinuria. Creatinine 1.38, BUN 33, K 4.3, Na 140, CO2 22, Cl 109, e-GFR 50, albumin 3.5.     Imaging:  Kidney US 12/1/2020  Multiple simple appearing bilateral renal cortical cysts, no suspicious renal mass lesions, no hydronephrosis, no nephrolithiasis. Mildly increased renal cortical echogenicity.     Impression and Plan     CKD stage G3a/A3, GFR 45-59 and albumin creatinine ratio >300 mg/g  -     Comprehensive Metabolic Panel; Future; Expected date: 07/27/2022  -     Protein/Creatinine Ratio, Urine; Future; Expected date: 07/27/2022  -     Urinalysis; Future; Expected date: 07/27/2022  -     Comprehensive Metabolic Panel; Future; Expected date: 12/04/2022  -     Protein/Creatinine Ratio, Urine; Future; Expected date: 12/04/2022  Ischemic kidney disease. No significant proteinuria or active urinary sediment. Renal indices are stable. Continue risk factor management and periodic monitoring.   RTC with routine labs in 4 months.     Primary hypertension   Blood pressure is at goal. Continue current medication regimen.     Anemia of chronic disease  -     ferrous sulfate (IRON, FERROUS SULFATE,) 325 mg (65 mg iron) Tab tablet; Take 1 tablet (325 mg total) by mouth every other day.  Dispense: 45 tablet; Refill: 1  -     OCCULT BLOOD FECAL IMMUNOASSAY; Future; Expected date: 08/18/2022  -     CBC Auto Differential; Future; Expected date: 12/04/2022  -     Iron and TIBC; Future; Expected date: 12/04/2022  -     Ferritin; Future; Expected date: 12/04/2022  Drop in H&H  noted. Will check FOB, start oral iron supplementation.     BMI 24.0-24.9, adult    Well balanced diet discussed.

## 2022-08-19 ENCOUNTER — LAB VISIT (OUTPATIENT)
Dept: LAB | Facility: HOSPITAL | Age: 77
End: 2022-08-19
Attending: NURSE PRACTITIONER
Payer: COMMERCIAL

## 2022-08-19 DIAGNOSIS — D63.8 ANEMIA OF CHRONIC DISEASE: Primary | ICD-10-CM

## 2022-08-19 PROCEDURE — 30000890 MAYO GENERIC ORDERABLE

## 2022-08-19 PROCEDURE — 82274 ASSAY TEST FOR BLOOD FECAL: CPT

## 2022-08-21 LAB — MAYO GENERIC ORDERABLE RESULT: ABNORMAL

## 2022-08-30 RX ORDER — TRAZODONE HYDROCHLORIDE 100 MG/1
100 TABLET ORAL NIGHTLY
Qty: 30 TABLET | Refills: 0 | Status: SHIPPED | OUTPATIENT
Start: 2022-08-30 | End: 2022-09-26 | Stop reason: SDUPTHER

## 2022-09-26 RX ORDER — TRAZODONE HYDROCHLORIDE 100 MG/1
100 TABLET ORAL NIGHTLY
Qty: 30 TABLET | Refills: 3 | Status: SHIPPED | OUTPATIENT
Start: 2022-09-26 | End: 2023-01-26 | Stop reason: SDUPTHER

## 2022-10-27 ENCOUNTER — OFFICE VISIT (OUTPATIENT)
Dept: INTERNAL MEDICINE | Facility: CLINIC | Age: 77
End: 2022-10-27
Payer: COMMERCIAL

## 2022-10-27 VITALS
HEIGHT: 67 IN | SYSTOLIC BLOOD PRESSURE: 164 MMHG | TEMPERATURE: 99 F | WEIGHT: 164.5 LBS | RESPIRATION RATE: 20 BRPM | HEART RATE: 78 BPM | BODY MASS INDEX: 25.82 KG/M2 | DIASTOLIC BLOOD PRESSURE: 78 MMHG

## 2022-10-27 DIAGNOSIS — Z23 NEED FOR VACCINATION FOR H FLU TYPE B: Primary | ICD-10-CM

## 2022-10-27 DIAGNOSIS — Z12.2 ENCOUNTER FOR SCREENING FOR LUNG CANCER: ICD-10-CM

## 2022-10-27 DIAGNOSIS — Z12.11 COLON CANCER SCREENING: ICD-10-CM

## 2022-10-27 DIAGNOSIS — R73.03 PREDIABETES: ICD-10-CM

## 2022-10-27 LAB — HBA1C MFR BLD: 5.9 %

## 2022-10-27 PROCEDURE — G0008 ADMIN INFLUENZA VIRUS VAC: HCPCS | Mod: PBBFAC

## 2022-10-27 PROCEDURE — 99215 OFFICE O/P EST HI 40 MIN: CPT | Mod: PBBFAC,25

## 2022-10-27 PROCEDURE — 83036 HEMOGLOBIN GLYCOSYLATED A1C: CPT | Mod: PBBFAC

## 2022-10-27 RX ORDER — OLMESARTAN MEDOXOMIL 40 MG/1
40 TABLET ORAL NIGHTLY
COMMUNITY
Start: 2022-10-24

## 2022-10-27 RX ORDER — HYDRALAZINE HYDROCHLORIDE 100 MG/1
100 TABLET, FILM COATED ORAL EVERY 8 HOURS
COMMUNITY

## 2022-10-27 NOTE — PROGRESS NOTES
[facility] INTERNAL MEDICINE  OUTPATIENT OFFICE VISIT NOTE    SUBJECTIVE:      Chief Complaint: Follow-up and Hypertension (Being seen by [facility]   changed meds )     HPI: Justyn Perez is a 77 y.o. yo male w/ PMH of HTN, HLD, renal artery stenosis, subclavian steal syndrome s/p stent, carotid stenosis s/p stent, PAD s/p BL revascularization, who presents to clinic for follow-up.  Patient states that overall he has been feeling well.  His blood pressure has been running high over the last several weeks but he follows with [facility] and they made adjustments to his blood pressure regimen on 10/13.  Since then his pressures have been stable and he has been feeling well with occasional headaches.  He has no other complaints at this time.  He is following with nephrology and vascular surgery for related issues and states he is able to maintain daily function.  He does yard work every day and performs a lot of the house work while his wife is at work.  He denies any recent episodes of chest pain and does endorse some dyspnea on exertion.  He denies any N/V, F/C, diarrhea, constipation, or dysuria.  He is compliant with his medication regimen.     Past Medical History:   has a past medical history of Aortic stenosis, Carpal tunnel syndrome, CKD (chronic kidney disease), Coronary artery disease, Disorder of kidney and ureter, Dyslipidemia, Hyperlipidemia, Hypertension, IGT (impaired glucose tolerance), and PVD (peripheral vascular disease).     Past Surgical History:   has a past surgical history that includes Carotid stent; Cardiac valve replacement; TAVR-TF (04/29/2020); Tonsillectomy; Renal artery angiogram; and femoral-popliteal artery bypass.     Family History:  family history includes No Known Problems in his brother, daughter, sister, and son; Parkinsonism in his mother; Stroke in his father.     Social History:   reports that he has never smoked. He has never used smokeless tobacco. He reports that he does not currently  use alcohol. He reports that he does not use drugs.     Allergies:  is allergic to penicillins.     Home Medications:  Prior to Admission medications    Medication Sig Start Date End Date Taking? Authorizing Provider   albuterol (ACCUNEB) 1.25 mg/3 mL Nebu Take 3 mLs (1.25 mg total) by nebulization every 6 (six) hours as needed. Rescue 6/30/22 6/30/23  Jay Martínez,    amLODIPine (NORVASC) 10 MG tablet Take 10 mg by mouth once daily. 2/14/22   Historical Provider   ascorbic acid, vitamin C, (VITAMIN C) 250 MG tablet Take 250 mg by mouth once daily.    Historical Provider   atorvastatin (LIPITOR) 80 MG tablet Take 80 mg by mouth once daily. 1/5/22   Historical Provider   carvediloL (COREG) 25 MG tablet Take 25 mg by mouth once daily at 6am. 4/18/22   Historical Provider   cloNIDine (CATAPRES) 0.1 MG tablet Take 0.1 mg by mouth. 2/14/22   Historical Provider   clopidogreL (PLAVIX) 75 mg tablet Take 75 mg by mouth once daily at 6am. 5/10/22   Historical Provider   ezetimibe (ZETIA) 10 mg tablet Take 10 mg by mouth once daily at 6am. 4/18/22   Historical Provider   ferrous sulfate (IRON, FERROUS SULFATE,) 325 mg (65 mg iron) Tab tablet Take 1 tablet (325 mg total) by mouth every other day. 8/4/22 1/31/23  Jen Horn, FNP   fluticasone propionate (FLONASE) 50 mcg/actuation nasal spray 1 spray by Nasal route Daily.    Historical Provider   glucosamine/chondr sy A sod (OSTEO BI-FLEX ORAL) Take 1 tablet by mouth once daily at 6am.    Historical Provider   hydrALAZINE (APRESOLINE) 50 MG tablet Take 1 tablet (50 mg total) by mouth once daily at 6am. for 60 doses 6/30/22 8/29/22  Jay Martínez DO   lisinopriL (PRINIVIL,ZESTRIL) 40 MG tablet Take 40 mg by mouth once daily at 6am. 4/19/22   Historical Provider   multivitamin with folic acid 400 mcg Tab Take 1 tablet by mouth once daily.    Historical Provider   NIFEdipine (PROCARDIA-XL) 60 MG (OSM) 24 hr tablet Take 60 mg by mouth once daily at 6am. 4/28/22    "Historical Provider   omeprazole (PRILOSEC) 40 MG capsule Take 40 mg by mouth.    Historical Provider   sertraline (ZOLOFT) 50 MG tablet Take 1 tablet (50 mg total) by mouth once daily. 6/30/22   Jay Martínez DO   traZODone (DESYREL) 100 MG tablet Take 1 tablet (100 mg total) by mouth every evening. 9/26/22   Ro Suarez MD     ROS negative unless stated in above HPI      OBJECTIVE:     Vital signs:   BP (!) 154/84   Pulse 78   Temp 98.8 °F (37.1 °C)   Resp 20   Ht 5' 7" (1.702 m)   Wt 74.6 kg (164 lb 8 oz)   BMI 25.76 kg/m²      Physical Examination:  General: Patient resting comfortably, in no acute distress   Eye: PERRLA, EOMI, clear conjunctiva, eyelids normal  HENT: Head-normocephalic and atraumatic  Neck: full range of motion, no thyromegaly or lymphadenopathy, trachea midline, supple, no palpable thyroid nodules  Respiratory: clear to auscultation bilaterally without wheezes, rales, rhonchi  Cardiovascular: regular rate and rhythm without murmurs.  No gallops or rubs no JVD.  Capillary refill within normal limits.  Gastrointestinal: soft, non-tender, non-distended with normal bowel sounds, without masses to palpation  Genitourinary: no CVA tenderness to palpation  Musculoskeletal: full range of motion of all extremities/spine without limitation or discomfort  Integumentary: no rashes or skin lesions present  Neurologic: no signs of peripheral neurological deficit, motor/sensory function intact  Psychiatric:  alert and oriented, cognitive function intact, cooperative with exam, good eye contact, judgement and insight intact, mood and affect full range.     Labs:  CMP:   Lab Results   Component Value Date    GLUCOSE 109 01/31/2022    CALCIUM 9.5 01/31/2022    ALBUMIN 3.7 01/31/2022     01/31/2022    K 4.4 01/31/2022    CO2 23 01/31/2022    BUN 20.6 01/31/2022    CREATININE 1.31 01/31/2022    ALKPHOS 97 01/31/2022    ALT 20 01/31/2022    AST 18 01/31/2022    BILITOT 0.6 01/31/2022     CBC: "   Lab Results   Component Value Date    WBC 8.4 10/26/2021    HGB 11.3 (L) 10/26/2021    HCT 34.4 (L) 10/26/2021    MCV 83.3 10/26/2021    RDW 18.3 (H) 10/26/2021     FLP:   Lab Results   Component Value Date    CHOL 140 10/26/2021    HDL 37 10/26/2021    LDL 88.00 10/26/2021    TRIG 74 10/26/2021    TOTALCHOLEST 4 10/26/2021     DM:   Lab Results   Component Value Date    HGBA1C 5.9 10/27/2022    .4 10/26/2021    CREATININE 1.31 01/31/2022    CREATRANDUR 199.6 01/31/2022    PROTEINURINE 175.8 01/31/2022     Thyroid:   Lab Results   Component Value Date    TSH 0.74 05/17/2017    HEFJYP6PFME 0.99 05/17/2017     LFTs:   Lab Results   Component Value Date    LABPROT 6.7 01/31/2022    ALBUMIN 3.7 01/31/2022    AST 18 01/31/2022    ALT 20 01/31/2022    ALKPHOS 97 01/31/2022       ASSESSMENT & PLAN:     Left Renal Artery Stenosis  - noted incidentally on screening for AAA per AA u/s on 11/16/2021  - following with vascular clinic, per note will see him 11/15/22 with BL lower extrem US and AKANKSHA, BL carotid US, and AAA US.    Right renal artery stenosis s/p right renal stent  - placed approx 10 years ago, sees nephrology clinic  - seen by vascular clinic, no intervention required at this time     Abnormal Bilateral AKANKSHA   PAD with hx of bilateral revascularization   - per screening for AAA on AA u/s on 11/16/2021, lower ext art u/s from march 2022 showed right AKANKSHA 0.81 with 50-75% stenosis and left AKANKSHA 0.86 with greater than 75% stenosis, bilateral abnormal ABIs, following with vascular clinic, no intervention recommended at this time, next appt 11/15/22     Critical Aortic stenosis/regurgitation s/p TAVR on 4/29/20  - continue follow up by Dr. Lin and Dr. Mares  - TTE 12/15/2021: EF 56%, severe LVH, left atrial enlargement, aortic bioprosthetic valve well seated, mild MS, mild TR, PASP elevated at 37-45    Subclavian steal syndrome, left s/p left subclavian arterial stent in Feb 2021  Carotid Artery Stenosis,  s/p R carotid artery stent placed by Dr. Cm 1/29/21  -US NIVA done in Feb 2020 which showed findings suggestive of moderate to moderately severe left arm arterial insufficiency with some degree of obstruction at the level of the proximal subclavian artery.  -Continue aspirin and Plavix    Carpal Tunnel s/p release in left hand and right hand  -Continue gabapentin 900 mg twice daily; hand surgeon Dr. Guy Esparza     HTN  -Currently taking Coreg 25mg BID. Lisinopril 40mg daily, clonidine 0.1mg, nifedipine 60 , hydralazine to 50 BID, lasix prn  -folliwng with [facility], medication regimen adjusted on 10/13/22   - BP today 154/84    CKD Stage IIIA  -BREE, ANCA, neg  -RP ultrasound December 2020: Multiple simple appearing bilateral renal cortical cysts and parenchymal echogenicity of kidneys bilaterally  - following nephrology clinic; Avoid nephrotoxic agents    HLD  -Continue atorvastatin 80, Zetia 10    CAD/ PAD s/p fem pop bypass  Superior mesenteric artery stenosis  -continue follow up with cardiology, surgery clinic recommended no surgical intervention at this time  -continue asa, statin    Arthritis  -continue tylenol arthritis PRN;     Depressed mood vs. Major Depressive Disorder  -Continue Sertraline 50mg daily    Pre-DM  -A1c 6.1 as of 10/26/2022, 5.9 today, recommended lifestyle mods, reorder a1c if still in pre-DM range or random BG elevated , will order metformin  -Eye exam in October 2019 showed no retinopathy, but +cataracts. Will be followed by ophtho    Insomnia  - cont Trazodone 100 mg     Health Maintenance/ Wellness  Flu vaccine: received today 10/27/22   Pneumococcal vaccine: UTD, has had prevnar and pneumovax  COVID vaccine: completed recent dose in 2/10/2021, booster received nov 2021  TDAP: UTD, done in 2014  Shingrix vaccine: UTD  AAA U/S screening: negative for AAA, done 11/16/2021  Screening colonoscopy: FIT negative 11/2/2021, repeat ordered today   Lung Cancer Screening: CT from 11/22/2021 Lung  RADS 2, benign, repeat in 12 months   Hepatitis Panel: Negative in 2015     Return to clinic in 4 month(s).    Ro Suarez MD  [facility] Internal Medicine, HO-1

## 2022-10-28 NOTE — PROGRESS NOTES
I have reviewed and concur with the resident's history, physical, assessment, and plan.  I have discussed with him all issues related to the diagnosis, workup and treatment plan.Care provided as reasonable and necessary.S/p Dulce Lay MD  [employer] [facility]

## 2022-11-29 RX ORDER — SERTRALINE HYDROCHLORIDE 50 MG/1
50 TABLET, FILM COATED ORAL DAILY
Qty: 60 TABLET | Refills: 2 | Status: SHIPPED | OUTPATIENT
Start: 2022-11-29

## 2022-12-07 DIAGNOSIS — I73.9 PERIPHERAL VASCULAR DISEASE: Primary | ICD-10-CM

## 2022-12-07 DIAGNOSIS — I10 HYPERTENSION, UNSPECIFIED TYPE: ICD-10-CM

## 2023-01-30 RX ORDER — TRAZODONE HYDROCHLORIDE 100 MG/1
100 TABLET ORAL NIGHTLY
Qty: 30 TABLET | Refills: 3 | Status: SHIPPED | OUTPATIENT
Start: 2023-01-30 | End: 2023-05-23 | Stop reason: SDUPTHER

## 2023-02-02 ENCOUNTER — OFFICE VISIT (OUTPATIENT)
Dept: NEPHROLOGY | Facility: CLINIC | Age: 78
End: 2023-02-02
Payer: COMMERCIAL

## 2023-02-02 ENCOUNTER — HOSPITAL ENCOUNTER (OUTPATIENT)
Dept: RADIOLOGY | Facility: HOSPITAL | Age: 78
Discharge: HOME | End: 2023-02-02
Attending: STUDENT IN AN ORGANIZED HEALTH CARE EDUCATION/TRAINING PROGRAM
Payer: COMMERCIAL

## 2023-02-02 VITALS
WEIGHT: 157.38 LBS | OXYGEN SATURATION: 98 % | HEIGHT: 67 IN | TEMPERATURE: 97 F | HEART RATE: 59 BPM | SYSTOLIC BLOOD PRESSURE: 142 MMHG | DIASTOLIC BLOOD PRESSURE: 60 MMHG | BODY MASS INDEX: 24.7 KG/M2 | RESPIRATION RATE: 20 BRPM

## 2023-02-02 DIAGNOSIS — I10 PRIMARY HYPERTENSION: ICD-10-CM

## 2023-02-02 DIAGNOSIS — I73.9 PERIPHERAL VASCULAR DISEASE: ICD-10-CM

## 2023-02-02 DIAGNOSIS — D50.9 IRON DEFICIENCY ANEMIA, UNSPECIFIED IRON DEFICIENCY ANEMIA TYPE: ICD-10-CM

## 2023-02-02 DIAGNOSIS — N18.31 CKD STAGE G3A/A3, GFR 45-59 AND ALBUMIN CREATININE RATIO >300 MG/G: Primary | ICD-10-CM

## 2023-02-02 PROCEDURE — 3077F PR MOST RECENT SYSTOLIC BLOOD PRESSURE >= 140 MM HG: ICD-10-PCS | Mod: CPTII,,, | Performed by: NURSE PRACTITIONER

## 2023-02-02 PROCEDURE — 1126F PR PAIN SEVERITY QUANTIFIED, NO PAIN PRESENT: ICD-10-PCS | Mod: CPTII,,, | Performed by: NURSE PRACTITIONER

## 2023-02-02 PROCEDURE — 3288F PR FALLS RISK ASSESSMENT DOCUMENTED: ICD-10-PCS | Mod: CPTII,,, | Performed by: NURSE PRACTITIONER

## 2023-02-02 PROCEDURE — 1159F MED LIST DOCD IN RCRD: CPT | Mod: CPTII,,, | Performed by: NURSE PRACTITIONER

## 2023-02-02 PROCEDURE — 1126F AMNT PAIN NOTED NONE PRSNT: CPT | Mod: CPTII,,, | Performed by: NURSE PRACTITIONER

## 2023-02-02 PROCEDURE — 3078F PR MOST RECENT DIASTOLIC BLOOD PRESSURE < 80 MM HG: ICD-10-PCS | Mod: CPTII,,, | Performed by: NURSE PRACTITIONER

## 2023-02-02 PROCEDURE — 1160F RVW MEDS BY RX/DR IN RCRD: CPT | Mod: CPTII,,, | Performed by: NURSE PRACTITIONER

## 2023-02-02 PROCEDURE — 1101F PR PT FALLS ASSESS DOC 0-1 FALLS W/OUT INJ PAST YR: ICD-10-PCS | Mod: CPTII,,, | Performed by: NURSE PRACTITIONER

## 2023-02-02 PROCEDURE — 3078F DIAST BP <80 MM HG: CPT | Mod: CPTII,,, | Performed by: NURSE PRACTITIONER

## 2023-02-02 PROCEDURE — 99214 PR OFFICE/OUTPT VISIT, EST, LEVL IV, 30-39 MIN: ICD-10-PCS | Mod: S$PBB,,, | Performed by: NURSE PRACTITIONER

## 2023-02-02 PROCEDURE — 1159F PR MEDICATION LIST DOCUMENTED IN MEDICAL RECORD: ICD-10-PCS | Mod: CPTII,,, | Performed by: NURSE PRACTITIONER

## 2023-02-02 PROCEDURE — 99215 OFFICE O/P EST HI 40 MIN: CPT | Mod: PBBFAC | Performed by: NURSE PRACTITIONER

## 2023-02-02 PROCEDURE — 1101F PT FALLS ASSESS-DOCD LE1/YR: CPT | Mod: CPTII,,, | Performed by: NURSE PRACTITIONER

## 2023-02-02 PROCEDURE — 3077F SYST BP >= 140 MM HG: CPT | Mod: CPTII,,, | Performed by: NURSE PRACTITIONER

## 2023-02-02 PROCEDURE — 1160F PR REVIEW ALL MEDS BY PRESCRIBER/CLIN PHARMACIST DOCUMENTED: ICD-10-PCS | Mod: CPTII,,, | Performed by: NURSE PRACTITIONER

## 2023-02-02 PROCEDURE — 3288F FALL RISK ASSESSMENT DOCD: CPT | Mod: CPTII,,, | Performed by: NURSE PRACTITIONER

## 2023-02-02 PROCEDURE — 99214 OFFICE O/P EST MOD 30 MIN: CPT | Mod: S$PBB,,, | Performed by: NURSE PRACTITIONER

## 2023-02-02 PROCEDURE — 76775 US EXAM ABDO BACK WALL LIM: CPT | Mod: TC

## 2023-02-02 RX ORDER — FERROUS SULFATE 325(65) MG
325 TABLET ORAL EVERY OTHER DAY
Qty: 45 TABLET | Refills: 1 | Status: SHIPPED | OUTPATIENT
Start: 2023-02-02 | End: 2023-08-01

## 2023-02-02 NOTE — PROGRESS NOTES
"[facility]  Nephrology Clinic Note    Chief complaint: Chronic Kidney Disease (Follow up)    History of present illness:   Justyn Perez is a 77 y.o. White male with past medical history of chronic kidney disease, IGT, coronary artery disease, hypertension, dyslipidemia, aortic stenosis (S/p TAVR in April 2002), peripheral vascular disease (right renal artery stenosis, s/p stenting, carotid artery stenosis, s/p stenting), carpal tunnel syndrome. Presents for follow up appointment in nephrology clinic today, denies complaints.     Review of Systems  12 point review of systems conducted, negative except as stated in the history of present illness.    Allergies: Patient is allergic to penicillins.     Past Medical History:  has a past medical history of Aortic stenosis, Carpal tunnel syndrome, CKD (chronic kidney disease), Coronary artery disease, Disorder of kidney and ureter, Dyslipidemia, Hyperlipidemia, Hypertension, IGT (impaired glucose tolerance), and PVD (peripheral vascular disease).    Procedure History:  has a past surgical history that includes Carotid stent; Cardiac valve replacement; TAVR-TF (04/29/2020); Tonsillectomy; Renal artery angiogram; and femoral-popliteal artery bypass.    Family History: family history includes No Known Problems in his brother, daughter, sister, and son; Parkinsonism in his mother; Stroke in his father.    Social History:  reports that he has never smoked. He has never used smokeless tobacco. He reports that he does not currently use alcohol. He reports that he does not use drugs.    Physical exam  BP (!) 142/60 (BP Location: Right arm, Patient Position: Sitting, BP Method: Medium (Manual))   Pulse (!) 59   Temp 97.4 °F (36.3 °C) (Oral)   Resp 20   Ht 5' 7" (1.702 m)   Wt 71.4 kg (157 lb 6.4 oz)   SpO2 98%   BMI 24.65 kg/m²   General appearance: Patient is in no acute distress.  Skin: No rashes or wounds.  HEENT: ALIN GALE, no " scleral icterus, no JVD. Neck is supple.  Chest: Respirations are unlabored. Lungs sounds are clear.   Heart: S1, S2.   Abdomen: Benign.  : Deferred.  Extremities: No edema, peripheral pulses are palpable.   Neuro: No focal deficits.     Home Medications:    Current Outpatient Medications:     albuterol (ACCUNEB) 1.25 mg/3 mL Nebu, Take 3 mLs (1.25 mg total) by nebulization every 6 (six) hours as needed. Rescue, Disp: 75 mL, Rfl: 0    amLODIPine (NORVASC) 10 MG tablet, Take 10 mg by mouth once daily., Disp: , Rfl:     ascorbic acid, vitamin C, (VITAMIN C) 250 MG tablet, Take 250 mg by mouth once daily., Disp: , Rfl:     atorvastatin (LIPITOR) 80 MG tablet, Take 80 mg by mouth once daily., Disp: , Rfl:     carvediloL (COREG) 25 MG tablet, Take 25 mg by mouth once daily at 6am., Disp: , Rfl:     cloNIDine (CATAPRES) 0.1 MG tablet, Take 0.1 mg by mouth. For blood pressure >170, Disp: , Rfl:     clopidogreL (PLAVIX) 75 mg tablet, Take 75 mg by mouth once daily at 6am., Disp: , Rfl:     ezetimibe (ZETIA) 10 mg tablet, Take 10 mg by mouth once daily at 6am., Disp: , Rfl:     fluticasone propionate (FLONASE) 50 mcg/actuation nasal spray, 1 spray by Nasal route Daily., Disp: , Rfl:     glucosamine/chondr sy A sod (OSTEO BI-FLEX ORAL), Take 1 tablet by mouth once daily at 6am., Disp: , Rfl:     hydrALAZINE (APRESOLINE) 100 MG tablet, Take 100 mg by mouth every 8 (eight) hours., Disp: , Rfl:     lisinopriL (PRINIVIL,ZESTRIL) 40 MG tablet, Take 40 mg by mouth once daily at 6am., Disp: , Rfl:     multivitamin with folic acid 400 mcg Tab, Take 1 tablet by mouth once daily., Disp: , Rfl:     NIFEdipine (PROCARDIA-XL) 60 MG (OSM) 24 hr tablet, Take 60 mg by mouth once daily at 6am., Disp: , Rfl:     olmesartan (BENICAR) 40 MG tablet, Take 40 mg by mouth once daily., Disp: , Rfl:     omeprazole (PRILOSEC) 40 MG capsule, Take 40 mg by mouth., Disp: , Rfl:     sertraline (ZOLOFT) 50 MG tablet, Take 1 tablet (50 mg total) by mouth  once daily., Disp: 60 tablet, Rfl: 2    traZODone (DESYREL) 100 MG tablet, Take 1 tablet (100 mg total) by mouth every evening., Disp: 30 tablet, Rfl: 3    ferrous sulfate (IRON, FERROUS SULFATE,) 325 mg (65 mg iron) Tab tablet, Take 1 tablet (325 mg total) by mouth every other day., Disp: 45 tablet, Rfl: 1    Laboratory data        Imaging  US Retroperitoneal Limited 12/01/2020  FINDINGS:  The right kidney measures approximately 8.3 cm in length. Right renal  parenchymal echogenicity is mildly increased. Multiple hypoechoic  simple appearing right renal cortical cysts are identified, the  largest arising exophytically from the upper pole measuring  approximately 2.7 x 2.5 x 3.4 cm. No suspicious right renal  parenchymal mass lesions or shadowing stones are otherwise identified.  There is no right hydronephrosis. Color Doppler demonstrates flow to  the right kidney.    The left kidney measures approximately 10 cm in length. Left renal  parenchymal echogenicity is mildly increased. Multiple hypoechoic  simple appearing left renal cortical cysts are identified, the largest  arising exophytically from the interpolar region measuring  approximately 4.5 x 2.6 x 4.8 cm. No suspicious left renal parenchymal  mass lesions or shadowing stones are otherwise identified. There is no  left hydronephrosis. Color Doppler demonstrates flow to the left  kidney.  IMPRESSION:  1. Multiple simple appearing bilateral renal cortical cysts. No  suspicious renal mass lesions, nephrolithiasis, or hydronephrosis.  2. Mildly increased parenchymal echogenicity of the kidneys  bilaterally, as can be seen with medical renal disease.  Electronically Signed By: Harpreet Campos MD  Date/Time Signed: 12/01/2020 13:35      Impression and plan     CKD stage G3a/A3, GFR 45-59 and albumin creatinine ratio >300 mg/g  Ischemic kidney disease. No significant proteinuria or active urinary sediment. Renal indices are stable. Continue risk factor  management and periodic monitoring.   RTC with routine labs in 4 months.     Primary hypertension  Blood pressure reading is at goal, continue current antihypertensive regimen and 2 g a day dietary sodium restriction.      Iron deficiency anemia, unspecified iron deficiency anemia type  -     ferrous sulfate (IRON, FERROUS SULFATE,) 325 mg (65 mg iron) Tab tablet; Take 1 tablet (325 mg total) by mouth every other day.  Dispense: 45 tablet; Refill: 1  Will start oral iron supplementation, repeat iron studies prior to next visit.  If iron by mouth is ineffective, consider intravenous iron replacement.      BMI 24.0-24.9, adult  Continue well-balanced diet.      Orders  Lab Frequency Next Occurrence   CV Ultrasound doppler arterial legs bilat Once 10/03/2022   CV Abdominal Aorta Once 10/03/2022   CV Ultrasound Bilateral Doppler Carotid Once 10/03/2022   CBC Auto Differential Once 05/20/2023   Comprehensive Metabolic Panel Once 05/20/2023   Iron and TIBC Once 05/20/2023   Ferritin Once 05/20/2023   CT Chest Lung Screening Low Dose Once 10/27/2022         2/2/2023  Jen Horn NP  [facility] Nephrology

## 2023-03-13 ENCOUNTER — OFFICE VISIT (OUTPATIENT)
Dept: INTERNAL MEDICINE | Facility: CLINIC | Age: 78
End: 2023-03-13
Payer: MEDICARE

## 2023-03-13 VITALS
SYSTOLIC BLOOD PRESSURE: 140 MMHG | TEMPERATURE: 98 F | HEART RATE: 60 BPM | DIASTOLIC BLOOD PRESSURE: 62 MMHG | HEIGHT: 67 IN | WEIGHT: 160 LBS | RESPIRATION RATE: 18 BRPM | BODY MASS INDEX: 25.11 KG/M2

## 2023-03-13 DIAGNOSIS — E78.5 HYPERLIPIDEMIA, UNSPECIFIED HYPERLIPIDEMIA TYPE: ICD-10-CM

## 2023-03-13 DIAGNOSIS — Z11.59 NEED FOR HEPATITIS C SCREENING TEST: ICD-10-CM

## 2023-03-13 DIAGNOSIS — I10 HYPERTENSION, UNSPECIFIED TYPE: Primary | ICD-10-CM

## 2023-03-13 PROCEDURE — 99215 OFFICE O/P EST HI 40 MIN: CPT | Mod: PBBFAC

## 2023-03-13 NOTE — PROGRESS NOTES
[facility] INTERNAL MEDICINE  OUTPATIENT OFFICE VISIT NOTE    SUBJECTIVE:      Chief Complaint: Follow-up (Denies any concerns at this time)    HPI: Justyn Perez is a 77 y.o. yo male w/ PMH of HTN, HLD, renal artery stenosis, subclavian steal syndrome s/p stent, carotid stenosis s/p stent, PAD s/p BL revascularization, who presents to clinic for follow-up.  Patient is doing well today with no complaints.  His wife recently passed away in December of 2022 but patient states that overall he is feeling well and has good support at home from his god daughter.  He says it has been hard being home without his wife but he knows she was very sick and appears to be in good spirits despite this recent event.  He says his physical health has been good overall and has no major complaints today.  Denies any recent episodes of N/V, F/C, diarrhea, constipation, chest pain, shortness of breath, dysuria, abdominal pain, headaches, dizziness, or vision changes.  He states he has a history of arthritis in his right hip and lower back for which he previously received steroid injections.  He has occasional pain associated with this history but does not take any medications at home for pain as he was unaware he could take tylenol in the setting of kidney disease.  He states he may be interested in pursuing further management for this problem but it is well controlled at this time and he does not feel like he needs a referral.  He is following with [facility] for significant cardiac history and was last seen by them aprox 1 month ago per patient.  He is also following with nephrology for history of CKD and renal artery stenosis s/p stent.  He denies any tobacco, alcohol, or recreational drug use although he does have a significant smoking history and quit aproxx 10 years ago.  He is compliant with medication regimen.     Past Medical History:   has a past medical history of Aortic stenosis, Carpal tunnel syndrome, CKD (chronic kidney  disease), Coronary artery disease, Disorder of kidney and ureter, Dyslipidemia, Hyperlipidemia, Hypertension, IGT (impaired glucose tolerance), and PVD (peripheral vascular disease).     Past Surgical History:   has a past surgical history that includes Carotid stent; Cardiac valve replacement; TAVR-TF (04/29/2020); Tonsillectomy; Renal artery angiogram; and femoral-popliteal artery bypass.     Family History:  family history includes No Known Problems in his brother, daughter, sister, and son; Parkinsonism in his mother; Stroke in his father.     Social History:   reports that he has never smoked. He has never used smokeless tobacco. He reports that he does not currently use alcohol. He reports that he does not use drugs.     Allergies:  is allergic to penicillins.     Home Medications:  Prior to Admission medications    Medication Sig Start Date End Date Taking? Authorizing Provider   albuterol (ACCUNEB) 1.25 mg/3 mL Nebu Take 3 mLs (1.25 mg total) by nebulization every 6 (six) hours as needed. Rescue 6/30/22 6/30/23  Jay Martínez DO   amLODIPine (NORVASC) 10 MG tablet Take 10 mg by mouth once daily. 2/14/22   Historical Provider   ascorbic acid, vitamin C, (VITAMIN C) 250 MG tablet Take 250 mg by mouth once daily.    Historical Provider   atorvastatin (LIPITOR) 80 MG tablet Take 80 mg by mouth once daily. 1/5/22   Historical Provider   carvediloL (COREG) 25 MG tablet Take 25 mg by mouth once daily at 6am. 4/18/22   Historical Provider   cloNIDine (CATAPRES) 0.1 MG tablet Take 0.1 mg by mouth. 2/14/22   Historical Provider   clopidogreL (PLAVIX) 75 mg tablet Take 75 mg by mouth once daily at 6am. 5/10/22   Historical Provider   ezetimibe (ZETIA) 10 mg tablet Take 10 mg by mouth once daily at 6am. 4/18/22   Historical Provider   ferrous sulfate (IRON, FERROUS SULFATE,) 325 mg (65 mg iron) Tab tablet Take 1 tablet (325 mg total) by mouth every other day. 8/4/22 1/31/23  Jen Horn, GRISELP   fluticasone  "propionate (FLONASE) 50 mcg/actuation nasal spray 1 spray by Nasal route Daily.    Historical Provider   glucosamine/chondr sy A sod (OSTEO BI-FLEX ORAL) Take 1 tablet by mouth once daily at 6am.    Historical Provider   hydrALAZINE (APRESOLINE) 50 MG tablet Take 1 tablet (50 mg total) by mouth once daily at 6am. for 60 doses 6/30/22 8/29/22  Jay Martínez DO   lisinopriL (PRINIVIL,ZESTRIL) 40 MG tablet Take 40 mg by mouth once daily at 6am. 4/19/22   Historical Provider   multivitamin with folic acid 400 mcg Tab Take 1 tablet by mouth once daily.    Historical Provider   NIFEdipine (PROCARDIA-XL) 60 MG (OSM) 24 hr tablet Take 60 mg by mouth once daily at 6am. 4/28/22   Historical Provider   omeprazole (PRILOSEC) 40 MG capsule Take 40 mg by mouth.    Historical Provider   sertraline (ZOLOFT) 50 MG tablet Take 1 tablet (50 mg total) by mouth once daily. 6/30/22   Jay Martínez DO   traZODone (DESYREL) 100 MG tablet Take 1 tablet (100 mg total) by mouth every evening. 9/26/22   Ro Suraez MD     Review of Systems   Constitutional:  Negative for chills and fever.   HENT: Negative.     Eyes:  Negative for blurred vision.   Respiratory:  Negative for cough and shortness of breath.    Cardiovascular:  Positive for leg swelling. Negative for chest pain.   Gastrointestinal:  Negative for abdominal pain, constipation, nausea and vomiting.   Genitourinary:  Negative for dysuria.   Musculoskeletal:  Positive for joint pain.   Skin:  Negative for rash.   Neurological:  Negative for dizziness and headaches.   Psychiatric/Behavioral:  Positive for depression.        OBJECTIVE:     Vital signs:   BP (!) 140/62 (BP Location: Left arm, Patient Position: Sitting, BP Method: Large (Automatic))   Pulse 60   Temp 97.7 °F (36.5 °C) (Oral)   Resp 18   Ht 5' 7" (1.702 m)   Wt 72.6 kg (160 lb)   BMI 25.06 kg/m²      Physical Examination:  General: Patient resting comfortably, in no acute distress   Eye: PERRLA EOMI, " clear conjunctiva, eyelids normal  HENT: Head-normocephalic and atraumatic  Neck: full range of motion, no thyromegaly or lymphadenopathy, trachea midline, supple, no palpable thyroid nodules  Respiratory: clear to auscultation bilaterally without wheezes, rales, rhonchi  Cardiovascular: regular rate and rhythm without murmurs.  No gallops or rubs no JVD.  Capillary refill within normal limits.  Gastrointestinal: soft, non-tender, non-distended with normal bowel sounds, without masses to palpation  Genitourinary: no CVA tenderness to palpation  Musculoskeletal: full range of motion of all extremities/spine without limitation or discomfort  Integumentary: no rashes or skin lesions present  Neurologic: no signs of peripheral neurological deficit, motor/sensory function intact  Psychiatric:  alert and oriented, cognitive function intact, cooperative with exam, good eye contact, judgement and insight intact, mood and affect full range.     Labs:  CMP:   Lab Results   Component Value Date    GLUCOSE 109 01/31/2022    CALCIUM 9.5 01/31/2022    ALBUMIN 3.7 01/31/2022     01/31/2022    K 4.4 01/31/2022    CO2 23 01/31/2022    BUN 20.6 01/31/2022    CREATININE 1.31 01/31/2022    ALKPHOS 97 01/31/2022    ALT 20 01/31/2022    AST 18 01/31/2022    BILITOT 0.6 01/31/2022     CBC:   Lab Results   Component Value Date    WBC 8.4 10/26/2021    HGB 11.3 (L) 10/26/2021    HCT 34.4 (L) 10/26/2021    MCV 83.3 10/26/2021    RDW 18.3 (H) 10/26/2021     FLP:   Lab Results   Component Value Date    CHOL 140 10/26/2021    HDL 37 10/26/2021    LDL 88.00 10/26/2021    TRIG 74 10/26/2021    TOTALCHOLEST 4 10/26/2021     DM:   Lab Results   Component Value Date    HGBA1C 6.1 10/26/2021    .4 10/26/2021    CREATININE 1.31 01/31/2022    CREATRANDUR 199.6 01/31/2022    PROTEINURINE 175.8 01/31/2022     Thyroid:   Lab Results   Component Value Date    TSH 0.74 05/17/2017    GFWTMF1GWCA 0.99 05/17/2017     LFTs:   Lab Results    Component Value Date    LABPROT 6.7 01/31/2022    ALBUMIN 3.7 01/31/2022    AST 18 01/31/2022    ALT 20 01/31/2022    ALKPHOS 97 01/31/2022       ASSESSMENT & PLAN:     Left Renal Artery Stenosis  - noted incidentally on screening for AAA per AA u/s on 11/16/2021  - following with vascular clinic, was supposed to be seen 12/13/22 for f/u w/ BL lower extrem US and AKANKSHA, BL carotid US, and AAA US but patient did not attend appointment   - AAA US was performed 12/7/2022 showing patent grafts and possible increased velocity in SMA.   -informed patient to reschedule appt for further follow-up.     Right renal artery stenosis s/p right renal stent  - placed approx 10 years ago, following nephrology clinic  - seen by vascular clinic, no intervention required at this time     Abnormal Bilateral AKANKSHA   PAD with hx of bilateral revascularization   - per screening for AAA on AA u/s on 11/16/2021, lower ext art u/s from march 2022 showed right AKANKSHA 0.81 with 50-75% stenosis and left AKANKSHA 0.86 with greater than 75% stenosis, bilateral abnormal ABIs, following with vascular clinic, no intervention recommended at this time  -repeat studies shceduled for 12/7/2022 but patient did not complete, informed patient to reschedule with vascular clinic for further follow-up.     Critical Aortic stenosis/regurgitation s/p TAVR on 4/29/20  - continue follow up by Dr. Lin and Dr. Mares at [facility]   - TTE 12/15/2021: EF 56%, severe LVH, left atrial enlargement, aortic bioprosthetic valve well seated, mild MS, mild TR, PASP elevated at 37-45    Subclavian steal syndrome, left s/p left subclavian arterial stent in Feb 2021  Carotid Artery Stenosis, s/p R carotid artery stent placed by Dr. Cm 1/29/21  -US NIVA done in Feb 2020 which showed findings suggestive of moderate to moderately severe left arm arterial insufficiency with some degree of obstruction at the level of the proximal subclavian artery.  -Continue aspirin and  Plavix  -following with [facility]     Carpal Tunnel s/p release in left hand and right hand  -Continue gabapentin 900 mg twice daily; hand surgeon Dr. Guy Esparza     HTN  -Currently taking Coreg 25mg BID. Lisinopril 40mg daily, clonidine 0.1mg, nifedipine 60 , hydralazine to 50 BID, lasix prn  -folliwng with [facility], medication regimen adjusted on 10/13/22   - BP today 140/62    CKD Stage IIIA  -BREE, ANCA, neg  -RP ultrasound December 2020: Multiple simple appearing bilateral renal cortical cysts and parenchymal echogenicity of kidneys bilaterally  - following nephrology clinic; Avoid nephrotoxic agents    HLD  -Continue atorvastatin 80, Zetia 10    CAD/ PAD s/p fem pop bypass  Superior mesenteric artery stenosis  -continue follow up with cardiology, surgery clinic recommended no surgical intervention at this time  -continue asa, statin    Arthritis  -continue tylenol arthritis PRN;     Depressed mood vs. Major Depressive Disorder  -Continue Sertraline 50mg daily    Pre-DM  -A1c 6.1 as of 10/26/2022, 5.9 today, recommended lifestyle mods, reorder a1c if still in pre-DM range or random BG elevated , will order metformin  -Eye exam in October 2019 showed no retinopathy, but +cataracts. Will be followed by ophtho    Insomnia  - cont Trazodone 100 mg     Health Maintenance/ Wellness  Flu vaccine: received today 10/27/22   Pneumococcal vaccine: UTD, has had prevnar and pneumovax  COVID vaccine: completed recent dose in 2/10/2021, booster received nov 2021  TDAP: UTD, done in 2014  Shingrix vaccine: UTD  AAA U/S screening: negative for AAA, done 11/16/2021  Screening colonoscopy: FIT negative 11/2/2021, repeat ordered today   Lung Cancer Screening: CT from 11/22/2021 Lung RADS 2, benign, re-ordered today   Hepatitis Panel: ordered today     Labs ordered for next visit: CBC, CMP, Lipid panel, A1c, hep panel   Return to clinic in 3 month(s).    Ro Suarez MD  [facility] Internal Medicine, HO[facility]

## 2023-03-14 NOTE — PROGRESS NOTES
Attending Addendum:   Patient seen and examined in clinic. Management and Plan were discussed with resident. Care was reasonable and necessary.   Shahana Coates MD  [facility] - Internal Medicine

## 2023-05-02 ENCOUNTER — TELEPHONE (OUTPATIENT)
Dept: INTERNAL MEDICINE | Facility: CLINIC | Age: 78
End: 2023-05-02

## 2023-05-02 NOTE — TELEPHONE ENCOUNTER
Pt Surinder Robles called stating pt is requesting a referral to [facility] in [place]. Pt states he is having trouble with hearing out of both ears. Please Advise fax; [phone]

## 2023-05-05 NOTE — TELEPHONE ENCOUNTER
DR. JOSE,     PLEASE ADDRESS.  PT HAS AN APPT 5/11/2023 WITH [facility].      PLEASE ADVISE.

## 2023-05-09 NOTE — TELEPHONE ENCOUNTER
GOOD MORNING DR. JOSE,    IT LOOKS LIKE MR. GRIGGS WILL NEED THAT REFERRAL TO [facility].   IF YOU PLACE IT IN THE CHART, I CAN SEND IT OUT ASAP.      THANK YOU IN ADVANCE.

## 2023-05-11 DIAGNOSIS — H91.90 HEARING LOSS, UNSPECIFIED HEARING LOSS TYPE, UNSPECIFIED LATERALITY: Primary | ICD-10-CM

## 2023-05-12 ENCOUNTER — TELEPHONE (OUTPATIENT)
Dept: INTERNAL MEDICINE | Facility: CLINIC | Age: 78
End: 2023-05-12
Payer: MEDICARE

## 2023-05-12 NOTE — TELEPHONE ENCOUNTER
Patient cam in person to get Audiology referral sent to [facility] in [place]. He states he had appt on 5/11/23 and could not schedule due to referral not being sent. Referral was in system but without provider information. Faxed referral and facesheet over to [phone] as requested. PT verbalized just wanted to make sure this gets done because he is having trouble hearing in both ears. Service Recovery ended with patient satisfied.

## 2023-05-23 RX ORDER — TRAZODONE HYDROCHLORIDE 100 MG/1
100 TABLET ORAL NIGHTLY
Qty: 30 TABLET | Refills: 3 | Status: SHIPPED | OUTPATIENT
Start: 2023-05-23

## 2023-12-29 DIAGNOSIS — R42 DIZZINESS: ICD-10-CM

## 2023-12-29 DIAGNOSIS — I65.09 VERTEBRAL ARTERY STENOSIS: Primary | ICD-10-CM

## 2024-01-31 ENCOUNTER — TELEPHONE (OUTPATIENT)
Dept: NEUROLOGY | Facility: CLINIC | Age: 79
End: 2024-01-31
Payer: COMMERCIAL

## 2024-01-31 ENCOUNTER — OFFICE VISIT (OUTPATIENT)
Dept: NEUROLOGY | Facility: CLINIC | Age: 79
End: 2024-01-31
Payer: MEDICARE

## 2024-01-31 VITALS
DIASTOLIC BLOOD PRESSURE: 65 MMHG | HEIGHT: 67 IN | HEART RATE: 60 BPM | BODY MASS INDEX: 25.11 KG/M2 | WEIGHT: 160 LBS | SYSTOLIC BLOOD PRESSURE: 140 MMHG

## 2024-01-31 DIAGNOSIS — I63.89 OTHER CEREBRAL INFARCTION: ICD-10-CM

## 2024-01-31 DIAGNOSIS — R42 DIZZINESS: ICD-10-CM

## 2024-01-31 DIAGNOSIS — I65.09 VERTEBRAL ARTERY STENOSIS: ICD-10-CM

## 2024-01-31 PROCEDURE — 1160F RVW MEDS BY RX/DR IN RCRD: CPT | Mod: CPTII,S$GLB,, | Performed by: PSYCHIATRY & NEUROLOGY

## 2024-01-31 PROCEDURE — 99205 OFFICE O/P NEW HI 60 MIN: CPT | Mod: S$GLB,,, | Performed by: PSYCHIATRY & NEUROLOGY

## 2024-01-31 PROCEDURE — 1159F MED LIST DOCD IN RCRD: CPT | Mod: CPTII,S$GLB,, | Performed by: PSYCHIATRY & NEUROLOGY

## 2024-01-31 PROCEDURE — 1101F PT FALLS ASSESS-DOCD LE1/YR: CPT | Mod: CPTII,S$GLB,, | Performed by: PSYCHIATRY & NEUROLOGY

## 2024-01-31 PROCEDURE — 3078F DIAST BP <80 MM HG: CPT | Mod: CPTII,S$GLB,, | Performed by: PSYCHIATRY & NEUROLOGY

## 2024-01-31 PROCEDURE — 3288F FALL RISK ASSESSMENT DOCD: CPT | Mod: CPTII,S$GLB,, | Performed by: PSYCHIATRY & NEUROLOGY

## 2024-01-31 PROCEDURE — 3077F SYST BP >= 140 MM HG: CPT | Mod: CPTII,S$GLB,, | Performed by: PSYCHIATRY & NEUROLOGY

## 2024-01-31 PROCEDURE — 99999 PR PBB SHADOW E&M-EST. PATIENT-LVL IV: CPT | Mod: PBBFAC,,, | Performed by: PSYCHIATRY & NEUROLOGY

## 2024-01-31 NOTE — TELEPHONE ENCOUNTER
Spoke with patient. Patient scheduled for Diagnostic Cerebral Angiogram on Feb 5th. Advised patient that a nurse from [facility] will call the Friday before with time and pre-op instructions. Verbalized understanding. Patient currently doesn't have any medication to hold prior to procedure.

## 2024-01-31 NOTE — PROGRESS NOTES
Neurology Office Visit  Neurology  HPI:    Justyn Perez is a 78 y.o. male who is referred to the clinic by his cardiologist Dr Blake for evaluation of dizziness. He has a complex medical & vascular history of CAD, CHF, HLD, PAD, PVD, HHD, R ROSI s/p mid carotid stent, L Subclavian stenosis s/p stent, s/p TAVR, AICD, left CEA, Renal artery stenosis. He reports that his dizziness has improved since receiving iron infusion for iron deficiency anemia. He complains of left ear throbbing pain and wears a hearing aid. He reports that he has been having throbbing sensation in his left ear for a while now. He also reports that he had dizziness for a long time which improved since he received iron infusion. He denies any episodes of weakness, numbness, balance problems, vision or speech changes. He does report hearing problem and hearing loss and wears hearing aids. He is on aspirin, plavix due to significant history of vascular disease throughout his body.     ROS:  Review of Systems   Constitutional:  Negative for weight loss.   HENT:  Positive for ear pain and hearing loss. Negative for ear discharge.    Eyes:  Negative for blurred vision, double vision and photophobia.   Gastrointestinal:  Negative for blood in stool, melena, nausea and vomiting.   Neurological:  Positive for dizziness. Negative for sensory change, speech change, weakness and headaches.   Endo/Heme/Allergies:  Does not bruise/bleed easily.        Past Medical History:   Diagnosis Date    Aortic stenosis     Carpal tunnel syndrome     CKD (chronic kidney disease)     Coronary artery disease     Disorder of kidney and ureter     Dyslipidemia     Hyperlipidemia     Hypertension     IGT (impaired glucose tolerance)     PVD (peripheral vascular disease)      Past Surgical History:   Procedure Laterality Date    CARDIAC VALVE REPLACEMENT      CAROTID STENT      femoral-popliteal artery bypass      Renal artery angiogram      TAVR-TF  04/29/2020     TONSILLECTOMY       Family History   Problem Relation Age of Onset    Parkinsonism Mother     Stroke Father     No Known Problems Sister     No Known Problems Brother     No Known Problems Daughter     No Known Problems Son      Review of patient's allergies indicates:   Allergen Reactions    Penicillins Rash       Current Outpatient Medications:     amLODIPine (NORVASC) 10 MG tablet, Take 10 mg by mouth once daily., Disp: , Rfl:     ascorbic acid, vitamin C, (VITAMIN C) 250 MG tablet, Take 250 mg by mouth once daily., Disp: , Rfl:     atorvastatin (LIPITOR) 80 MG tablet, Take 80 mg by mouth once daily., Disp: , Rfl:     carvediloL (COREG) 25 MG tablet, Take 25 mg by mouth once daily at 6am., Disp: , Rfl:     cloNIDine (CATAPRES) 0.1 MG tablet, Take 0.1 mg by mouth. For blood pressure >170, Disp: , Rfl:     clopidogreL (PLAVIX) 75 mg tablet, Take 75 mg by mouth once daily at 6am., Disp: , Rfl:     ezetimibe (ZETIA) 10 mg tablet, Take 10 mg by mouth once daily at 6am., Disp: , Rfl:     fluticasone propionate (FLONASE) 50 mcg/actuation nasal spray, 1 spray by Nasal route Daily., Disp: , Rfl:     glucosamine/chondr sy A sod (OSTEO BI-FLEX ORAL), Take 1 tablet by mouth once daily at 6am., Disp: , Rfl:     hydrALAZINE (APRESOLINE) 100 MG tablet, Take 100 mg by mouth every 8 (eight) hours., Disp: , Rfl:     lisinopriL (PRINIVIL,ZESTRIL) 40 MG tablet, Take 40 mg by mouth once daily at 6am., Disp: , Rfl:     multivitamin with folic acid 400 mcg Tab, Take 1 tablet by mouth once daily., Disp: , Rfl:     NIFEdipine (PROCARDIA-XL) 60 MG (OSM) 24 hr tablet, Take 60 mg by mouth once daily at 6am., Disp: , Rfl:     olmesartan (BENICAR) 40 MG tablet, Take 40 mg by mouth once daily., Disp: , Rfl:     omeprazole (PRILOSEC) 40 MG capsule, Take 40 mg by mouth., Disp: , Rfl:     sertraline (ZOLOFT) 50 MG tablet, Take 1 tablet (50 mg total) by mouth once daily., Disp: 60 tablet, Rfl: 2    traZODone (DESYREL) 100 MG tablet, Take 1 tablet  (100 mg total) by mouth every evening., Disp: 30 tablet, Rfl: 3    albuterol (ACCUNEB) 1.25 mg/3 mL Nebu, Take 3 mLs (1.25 mg total) by nebulization every 6 (six) hours as needed. Rescue, Disp: 75 mL, Rfl: 0  Outpatient Medications Marked as Taking for the 1/31/24 encounter (Office Visit) with Clif Acuna MD   Medication Sig Dispense Refill    amLODIPine (NORVASC) 10 MG tablet Take 10 mg by mouth once daily.      ascorbic acid, vitamin C, (VITAMIN C) 250 MG tablet Take 250 mg by mouth once daily.      atorvastatin (LIPITOR) 80 MG tablet Take 80 mg by mouth once daily.      carvediloL (COREG) 25 MG tablet Take 25 mg by mouth once daily at 6am.      cloNIDine (CATAPRES) 0.1 MG tablet Take 0.1 mg by mouth. For blood pressure >170      clopidogreL (PLAVIX) 75 mg tablet Take 75 mg by mouth once daily at 6am.      ezetimibe (ZETIA) 10 mg tablet Take 10 mg by mouth once daily at 6am.      fluticasone propionate (FLONASE) 50 mcg/actuation nasal spray 1 spray by Nasal route Daily.      glucosamine/chondr sy A sod (OSTEO BI-FLEX ORAL) Take 1 tablet by mouth once daily at 6am.      hydrALAZINE (APRESOLINE) 100 MG tablet Take 100 mg by mouth every 8 (eight) hours.      lisinopriL (PRINIVIL,ZESTRIL) 40 MG tablet Take 40 mg by mouth once daily at 6am.      multivitamin with folic acid 400 mcg Tab Take 1 tablet by mouth once daily.      NIFEdipine (PROCARDIA-XL) 60 MG (OSM) 24 hr tablet Take 60 mg by mouth once daily at 6am.      olmesartan (BENICAR) 40 MG tablet Take 40 mg by mouth once daily.      omeprazole (PRILOSEC) 40 MG capsule Take 40 mg by mouth.      sertraline (ZOLOFT) 50 MG tablet Take 1 tablet (50 mg total) by mouth once daily. 60 tablet 2    traZODone (DESYREL) 100 MG tablet Take 1 tablet (100 mg total) by mouth every evening. 30 tablet 3     Social History     Tobacco Use    Smoking status: Never    Smokeless tobacco: Never   Substance Use Topics    Alcohol use: Not Currently    Drug use: Never         Vitals:     01/31/24 0826   BP: (!) 140/65   Pulse: 60       Physical Exam:  HEENT NC/AT  CV RRR, no tenderness  Resp clear without dyspnea  GI soft  Ext no edema    Neuro:  Alert & oriented x 3  EOMI, PERRLA, visual field intact in all 4 quadrants  Face symmetric, speech clear and fluent, facial sensation equal bilaterally  Tongue midline  Strength 5/5 in bilateral upper and lower extremities   Sensation intact and equal bilaterally  Gait steady and balanced     Imaging:  No imaging available for review.     Assessment:   Justyn Perez is a 78 y.o. male who is referred to the clinic by his cardiologist Dr Blake for evaluation of dizziness. He has a complex medical & vascular history of CAD, CHF, HLD, PAD, PVD, HHD, R ROSI s/p mid carotid stent, L Subclavian stenosis s/p stent, s/p TAVR, AICD, left CEA, Renal artery stenosis. He reports that his dizziness has improved since receiving iron infusion for iron deficiency anemia. His examination is unremarkable.     I explained that his symptoms were likely due to anemia which is evident by improvement in his symptoms since iron infusion. However, he has extensive history of vascular disease throughout his body including carotid arteries. If there is concern for vertebral artery stenosis, the best diagnostic test is a diagnostic cerebral angiogram which will also allow evaluation of other cervical and intracranial vasculature for any stenosis. I offered him observation vs DSA and he decided to proceed with the angiogram at this time.     Plan:   - Diagnostic cerebral angiogram to evaluate extracranial and intracranial vasculature  - recommend referral to ENT to evaluate the left ear throbbing pain  - f/u in 2 months    Clif Acuna MD  Vascular and Interventional Neurology    60 minutes were personally spent on this visit including my review of available records from referring physician, prior imaging, comprehensive physical and neurologic examination and discussion  with the patient including plan of care and counseling .

## 2024-02-02 ENCOUNTER — TELEPHONE (OUTPATIENT)
Dept: INTERNAL MEDICINE | Facility: CLINIC | Age: 79
End: 2024-02-02
Payer: COMMERCIAL

## 2024-02-02 NOTE — TELEPHONE ENCOUNTER
I attempt to reach pt ,but unfortunately I was  unable to reach patient   so i left both a voice mail and call back number.

## 2024-02-05 ENCOUNTER — HOSPITAL ENCOUNTER (OUTPATIENT)
Dept: INTERVENTIONAL RADIOLOGY/VASCULAR | Facility: HOSPITAL | Age: 79
Discharge: HOME | End: 2024-02-05
Attending: PSYCHIATRY & NEUROLOGY | Admitting: PSYCHIATRY & NEUROLOGY
Payer: COMMERCIAL

## 2024-02-05 VITALS
BODY MASS INDEX: 25.3 KG/M2 | HEART RATE: 59 BPM | OXYGEN SATURATION: 97 % | TEMPERATURE: 98 F | HEIGHT: 66 IN | SYSTOLIC BLOOD PRESSURE: 162 MMHG | RESPIRATION RATE: 18 BRPM | DIASTOLIC BLOOD PRESSURE: 73 MMHG | WEIGHT: 157.44 LBS

## 2024-02-05 DIAGNOSIS — I65.09 VERTEBRAL ARTERY STENOSIS: ICD-10-CM

## 2024-02-05 DIAGNOSIS — R42 DIZZINESS: ICD-10-CM

## 2024-02-05 DIAGNOSIS — I63.89 OTHER CEREBRAL INFARCTION: ICD-10-CM

## 2024-02-05 LAB
ANION GAP SERPL CALC-SCNC: 7 MEQ/L
BASOPHILS # BLD AUTO: 0.03 X10(3)/MCL
BASOPHILS NFR BLD AUTO: 0.4 %
BUN SERPL-MCNC: 32 MG/DL (ref 8.4–25.7)
CALCIUM SERPL-MCNC: 8.8 MG/DL (ref 8.8–10)
CHLORIDE SERPL-SCNC: 112 MMOL/L (ref 98–107)
CO2 SERPL-SCNC: 22 MMOL/L (ref 23–31)
CREAT SERPL-MCNC: 1.25 MG/DL (ref 0.73–1.18)
CREAT/UREA NIT SERPL: 26
EOSINOPHIL # BLD AUTO: 0.17 X10(3)/MCL (ref 0–0.9)
EOSINOPHIL NFR BLD AUTO: 2.2 %
ERYTHROCYTE [DISTWIDTH] IN BLOOD BY AUTOMATED COUNT: 18.1 % (ref 11.5–17)
GFR SERPLBLD CREATININE-BSD FMLA CKD-EPI: 59 MLS/MIN/1.73/M2
GLUCOSE SERPL-MCNC: 116 MG/DL (ref 82–115)
HCT VFR BLD AUTO: 27.8 % (ref 42–52)
HGB BLD-MCNC: 8.8 G/DL (ref 14–18)
IMM GRANULOCYTES # BLD AUTO: 0.03 X10(3)/MCL (ref 0–0.04)
IMM GRANULOCYTES NFR BLD AUTO: 0.4 %
LYMPHOCYTES # BLD AUTO: 0.95 X10(3)/MCL (ref 0.6–4.6)
LYMPHOCYTES NFR BLD AUTO: 12.3 %
MCH RBC QN AUTO: 28.5 PG (ref 27–31)
MCHC RBC AUTO-ENTMCNC: 31.7 G/DL (ref 33–36)
MCV RBC AUTO: 90 FL (ref 80–94)
MONOCYTES # BLD AUTO: 0.61 X10(3)/MCL (ref 0.1–1.3)
MONOCYTES NFR BLD AUTO: 7.9 %
NEUTROPHILS # BLD AUTO: 5.95 X10(3)/MCL (ref 2.1–9.2)
NEUTROPHILS NFR BLD AUTO: 76.8 %
NRBC BLD AUTO-RTO: 0 %
PLATELET # BLD AUTO: 208 X10(3)/MCL (ref 130–400)
PMV BLD AUTO: 10.4 FL (ref 7.4–10.4)
POC PTINR: 1.2 (ref 0.9–1.2)
POC PTWBT: 14.1 SEC (ref 9.7–14.3)
POTASSIUM SERPL-SCNC: 3.9 MMOL/L (ref 3.5–5.1)
RBC # BLD AUTO: 3.09 X10(6)/MCL (ref 4.7–6.1)
SAMPLE: NORMAL
SODIUM SERPL-SCNC: 141 MMOL/L (ref 136–145)
WBC # SPEC AUTO: 7.74 X10(3)/MCL (ref 4.5–11.5)

## 2024-02-05 PROCEDURE — 36223 PLACE CATH CAROTID/INOM ART: CPT | Mod: 50

## 2024-02-05 PROCEDURE — 99152 MOD SED SAME PHYS/QHP 5/>YRS: CPT | Performed by: PSYCHIATRY & NEUROLOGY

## 2024-02-05 PROCEDURE — 36226 PLACE CATH VERTEBRAL ART: CPT | Mod: RT | Performed by: PSYCHIATRY & NEUROLOGY

## 2024-02-05 PROCEDURE — 36223 PLACE CATH CAROTID/INOM ART: CPT | Mod: 51,50,, | Performed by: PSYCHIATRY & NEUROLOGY

## 2024-02-05 PROCEDURE — 80048 BASIC METABOLIC PNL TOTAL CA: CPT | Performed by: PSYCHIATRY & NEUROLOGY

## 2024-02-05 PROCEDURE — 63600175 PHARM REV CODE 636 W HCPCS: Mod: JZ,JG | Performed by: PSYCHIATRY & NEUROLOGY

## 2024-02-05 PROCEDURE — 36225 PLACE CATH SUBCLAVIAN ART: CPT | Mod: 59,LT | Performed by: PSYCHIATRY & NEUROLOGY

## 2024-02-05 PROCEDURE — C1887 CATHETER, GUIDING: HCPCS | Performed by: PSYCHIATRY & NEUROLOGY

## 2024-02-05 PROCEDURE — 27201423 OPTIME MED/SURG SUP & DEVICES STERILE SUPPLY: Performed by: PSYCHIATRY & NEUROLOGY

## 2024-02-05 PROCEDURE — 99152 MOD SED SAME PHYS/QHP 5/>YRS: CPT | Mod: ,,, | Performed by: PSYCHIATRY & NEUROLOGY

## 2024-02-05 PROCEDURE — 25500020 PHARM REV CODE 255: Performed by: PSYCHIATRY & NEUROLOGY

## 2024-02-05 PROCEDURE — 76937 US GUIDE VASCULAR ACCESS: CPT | Mod: 26,,, | Performed by: PSYCHIATRY & NEUROLOGY

## 2024-02-05 PROCEDURE — 36225 PLACE CATH SUBCLAVIAN ART: CPT | Mod: 59,LT,, | Performed by: PSYCHIATRY & NEUROLOGY

## 2024-02-05 PROCEDURE — C1769 GUIDE WIRE: HCPCS | Performed by: PSYCHIATRY & NEUROLOGY

## 2024-02-05 PROCEDURE — 99153 MOD SED SAME PHYS/QHP EA: CPT | Performed by: PSYCHIATRY & NEUROLOGY

## 2024-02-05 PROCEDURE — 25000003 PHARM REV CODE 250: Performed by: PSYCHIATRY & NEUROLOGY

## 2024-02-05 PROCEDURE — 36226 PLACE CATH VERTEBRAL ART: CPT | Mod: RT,,, | Performed by: PSYCHIATRY & NEUROLOGY

## 2024-02-05 PROCEDURE — 85025 COMPLETE CBC W/AUTO DIFF WBC: CPT | Performed by: PSYCHIATRY & NEUROLOGY

## 2024-02-05 PROCEDURE — C1894 INTRO/SHEATH, NON-LASER: HCPCS | Performed by: PSYCHIATRY & NEUROLOGY

## 2024-02-05 RX ORDER — SODIUM CHLORIDE 9 MG/ML
INJECTION, SOLUTION INTRAVENOUS CONTINUOUS
Status: ACTIVE | OUTPATIENT
Start: 2024-02-05 | End: 2024-02-05

## 2024-02-05 RX ORDER — SODIUM CHLORIDE 9 MG/ML
INJECTION, SOLUTION INTRAVENOUS CONTINUOUS
Status: DISCONTINUED | OUTPATIENT
Start: 2024-02-05 | End: 2024-02-06 | Stop reason: HOSPADM

## 2024-02-05 RX ORDER — HEPARIN SOD,PORCINE/0.9 % NACL 1000/500ML
INTRAVENOUS SOLUTION INTRAVENOUS
Status: COMPLETED | OUTPATIENT
Start: 2024-02-05 | End: 2024-02-05

## 2024-02-05 RX ORDER — FENTANYL CITRATE 50 UG/ML
INJECTION, SOLUTION INTRAMUSCULAR; INTRAVENOUS
Status: COMPLETED | OUTPATIENT
Start: 2024-02-05 | End: 2024-02-05

## 2024-02-05 RX ORDER — VERAPAMIL HYDROCHLORIDE 2.5 MG/ML
INJECTION, SOLUTION INTRAVENOUS
Status: COMPLETED | OUTPATIENT
Start: 2024-02-05 | End: 2024-02-05

## 2024-02-05 RX ORDER — LIDOCAINE HYDROCHLORIDE 20 MG/ML
INJECTION, SOLUTION INFILTRATION; PERINEURAL
Status: COMPLETED | OUTPATIENT
Start: 2024-02-05 | End: 2024-02-05

## 2024-02-05 RX ORDER — ONDANSETRON HYDROCHLORIDE 2 MG/ML
4 INJECTION, SOLUTION INTRAVENOUS EVERY 8 HOURS PRN
Status: DISCONTINUED | OUTPATIENT
Start: 2024-02-05 | End: 2024-02-06 | Stop reason: HOSPADM

## 2024-02-05 RX ORDER — ACETAMINOPHEN 325 MG/1
650 TABLET ORAL EVERY 4 HOURS PRN
Status: DISCONTINUED | OUTPATIENT
Start: 2024-02-05 | End: 2024-02-06 | Stop reason: HOSPADM

## 2024-02-05 RX ORDER — NITROGLYCERIN 20 MG/100ML
INJECTION INTRAVENOUS
Status: COMPLETED | OUTPATIENT
Start: 2024-02-05 | End: 2024-02-05

## 2024-02-05 RX ORDER — HYDRALAZINE HYDROCHLORIDE 20 MG/ML
10 INJECTION INTRAMUSCULAR; INTRAVENOUS EVERY 4 HOURS PRN
Status: DISCONTINUED | OUTPATIENT
Start: 2024-02-05 | End: 2024-02-06 | Stop reason: HOSPADM

## 2024-02-05 RX ADMIN — IOPAMIDOL 100 ML: 755 INJECTION, SOLUTION INTRAVENOUS at 09:02

## 2024-02-05 RX ADMIN — NITROGLYCERIN 200 MCG: 20 INJECTION INTRAVENOUS at 08:02

## 2024-02-05 RX ADMIN — Medication 3.5 ML: at 08:02

## 2024-02-05 RX ADMIN — FENTANYL CITRATE 50 MCG: 50 INJECTION INTRAMUSCULAR; INTRAVENOUS at 08:02

## 2024-02-05 RX ADMIN — VERAPAMIL HYDROCHLORIDE 2.5 MG: 2.5 INJECTION, SOLUTION INTRAVENOUS at 08:02

## 2024-02-05 RX ADMIN — Medication 2250 ML: at 08:02

## 2024-02-05 RX ADMIN — LIDOCAINE HYDROCHLORIDE 5 ML: 20 INJECTION, SOLUTION INFILTRATION; PERINEURAL at 08:02

## 2024-02-05 NOTE — BRIEF OP NOTE
Name: Justyn Perez  MRN: [id number]  Age: 78 y.o.  Gender: male    Date of Procedure: 02/05/2024    Pre-operative Diagnosis: Concern for right VA stenosis    Post-operative Diagnosis: no evidence of significant stenosis    Procedure: Diagnostic cerebral angiogram    Access/Closure: Right radial artery access    Surgeon: Clif Acuna MD    Anesthesia: LA and conscious sedation    EBL: min    Description of findings:  - no evidence of significant stenosis   - patent left subclavian artery and right common carotid artery stents  - mild stenosis at bilateral carotid bifurcation, bilateral vertebral artery origin  - left vertebral artery ends in PICA  - right VA to R occipital artery anastomoses  - bilateral ICAD without significant stenosis    Patient condition:   stable    Implant/specimen(s):  none    Plan:  - no evidence of significant stenosis  - rest of the care per primary cardiologist Dr Mares.     Clif Acuna MD  Interventional Neurology

## 2024-02-05 NOTE — OP NOTE
[facility]                       [address] [id number]     PATIENT NAME:Justyn Perez    YOB: 1945      DATE OF SURGERY: 02/05/2024      SURGEON:  Clif Acuna MD     PREOPERATIVE DIAGNOSIS:  Concern for right Vertebral artery stenosis     POSTOPERATIVE DIAGNOSIS:  no evidence of significant stenosis     PROCEDURE PERFORMED:    Cerebral angiogram with catheter insertion in the following arteries:  Right common carotid, right subclavian, right vertebral, left common carotid, left subclavian  Interpretation of images  Ultrasound-guided right radial artery access  Moderate conscious sedation for 60 minutes     LEVEL OF SEDATION:  Conscious sedation.  Sedation administered by independent   trained observer under attending supervision with continuous monitoring of the   patient's level of consciousness and physiologic status.     TOTAL INTRASERVICE SEDATION TIME:  60 minutes.     COMPLICATIONS:  None.    APPROACH:  Right radial artery      VESSELS SELECTIVELY CATHETERIZED:   Left common carotid artery  Left subclavian artery   Right common carotid artery   Right subclavian artery   Right vertebral artery    DEVICES USED:  5 Fr slender sheath  5 Fr Sim 2 catheter  035 glidewire  TR band     INDICATION:  78 y.o. years old male with a history of H/o R ROSI, Left CEA, L Subclavian stent. CUS showed concern for right V2 stenosis.   He presents for a cerebral angiogram for evaluation.    DETAILED DESCRIPTION:      Risks/benefits were thoroughly reviewed with patient/family prior to the procedure.  Risks inclusive but not limited to death, stroke, contrast reaction/nephropathy, access/vascular injury, and other unforeseen risks.  Patient/family provided informed consent.  Patient supine on the angio table, wrist prepped and draped in routine fashion.  Moderate conscious sedation was administered along with local  anesthesia.    The right radial artery was sonographically evaluated and judged to be patent.  Real-time ultrasound was used to visualize needle entry into the vessel and a permanent image was stored. An arterial sheath was placed over the wire.  Radial cocktail of 2.5mg verapamil, 200 mcg nitroglycerin and 3500 units of heparin were given through the sheath. Diagnostic catheter telescoping over the 035 glidewire advanced to the arch and double flushed.  Enumerated vessels were then selectively catheterized and angiograms obtained as listed below.  Multiple cervical and intracranial runs were obtained in AP and lateral projection.  The films were reviewed and determined to be of good diagnostic quality.   Diagnostic catheter and sheath were then withdrawn and hemostasis was obtained with above closure device.  No immediate complications were noted.  Patient tolerated the procedure well.    Angiograms performed and findings:    Right radial artery:  Sheath placement in the right radial artery.  There is no evidence of stenosis, dissection, atherosclerosis or contrast extravasation at the site of puncture.  Left common carotid artery - cervical:  Unremarkable left common carotid. Evidence of atherosclerotic irregularity at the carotid bifurcation. No evidence of significant stenosis. The left internal carotid artery is widely patent distally the left external carotid artery and its branches appear unremarkable.    Left Common carotid artery - cerebral:  The distal cervical, petrous, cavernous, supraclinoid segments of the left ICA appeared patent.  Evidence of irregularity in the intracranial segment without significant stenosis.  There is normal flow and branching noted in the left CONY and MCA territory.  The capillary and venous phases appear unremarkable.  Left subclavian artery - cervical:  Patent left Subclavian artery stent. Mild stenosis of left vertebral artery origin noted.   Left subclavian artery -  cerebral:  Left vertebral artery is non dominant. The left VA ends in left PICA.  Right common carotid artery - cervical:  Unremarkable right common carotid artery. Patent right CCA stent. Evidence of irregularity noted at carotid bifurcation without significant stenosis.  The internal carotid artery is widely patent distally.    Right Common carotid artery - cerebral:  The distal cervical, petrous, cavernous, supraclinoid segments of the right ICA appear patent. Evidence of irregularity throughout intracranial segments of right ICA without significant stenosis.  There is normal flow and branching noted in right CONY and MCA territory.  The capillary and venous phases appear unremarkable.  Right subclavian artery - cervical:  Normal origin and cervical course of the right vertebral artery.  Mild stenosis of right VA origin measuring less than 50%.  Right vertebral artery - cerebral:  Right vertebral artery is co-dominant.  Unremarkable distal cervical and intracranial segments of the right vertebral artery.  Right PICA, bilateral AICA, SCA, PCA are visualized. There is right VA to right occipital artery anastomoses. The basilar artery appears patent.  There is normal capillary and venous phase.        OVERALL IMPRESSION:  - no evidence of significant stenosis   - patent left subclavian artery and right common carotid artery stents  - mild stenosis at bilateral carotid bifurcation, bilateral vertebral artery origin  - left vertebral artery ends in PICA  - right VA to Right occipital artery anastomoses  - bilateral intracranial atherosclerotic disease without significant stenosis        ______________________________  Clif Acuna MD  Vascular and Interventional Neurology

## 2024-02-05 NOTE — H&P
Pre-Operative History and Physical   Interventional Neurology    Justyn Perez is a 78 y.o. male who presents for a diagnostic cerebral angiogram for evaluation of dizziness. He was referred by Dr Blake for concern for right vertebral artery stenosis. H/o R ROSI, Left CEA, L Subclavian stent. CUS showed concern for right V2 stenosis.    Denies any new concerns since last seen in clinic.     ROS:  ROS as described in HPI    Past Medical History:   Diagnosis Date    Aortic stenosis     Carpal tunnel syndrome     CKD (chronic kidney disease)     Coronary artery disease     Disorder of kidney and ureter     Dyslipidemia     Hyperlipidemia     Hypertension     IGT (impaired glucose tolerance)     PVD (peripheral vascular disease)      Past Surgical History:   Procedure Laterality Date    CARDIAC VALVE REPLACEMENT      CAROTID STENT      femoral-popliteal artery bypass      Renal artery angiogram      TAVR-TF  04/29/2020    TONSILLECTOMY       Family History   Problem Relation Age of Onset    Parkinsonism Mother     Stroke Father     No Known Problems Sister     No Known Problems Brother     No Known Problems Daughter     No Known Problems Son      Review of patient's allergies indicates:   Allergen Reactions    Penicillins Rash       Current Outpatient Medications:     amLODIPine (NORVASC) 10 MG tablet, Take 10 mg by mouth every evening., Disp: , Rfl:     ascorbic acid, vitamin C, (VITAMIN C) 250 MG tablet, Take 250 mg by mouth once daily., Disp: , Rfl:     atorvastatin (LIPITOR) 80 MG tablet, Take 80 mg by mouth every evening., Disp: , Rfl:     carvediloL (COREG) 25 MG tablet, Take 25 mg by mouth once daily at 6am., Disp: , Rfl:     cloNIDine (CATAPRES) 0.1 MG tablet, Take 0.1 mg by mouth 2 (two) times daily. For blood pressure >170, Disp: , Rfl:     clopidogreL (PLAVIX) 75 mg tablet, Take 75 mg by mouth once daily at 6am., Disp: , Rfl:     ezetimibe (ZETIA) 10 mg tablet, Take 10 mg by mouth every  evening., Disp: , Rfl:     glucosamine/chondr sy A sod (OSTEO BI-FLEX ORAL), Take 1 tablet by mouth once daily at 6am., Disp: , Rfl:     hydrALAZINE (APRESOLINE) 100 MG tablet, Take 100 mg by mouth every 8 (eight) hours., Disp: , Rfl:     multivitamin with folic acid 400 mcg Tab, Take 1 tablet by mouth once daily., Disp: , Rfl:     olmesartan (BENICAR) 40 MG tablet, Take 40 mg by mouth every evening., Disp: , Rfl:     sertraline (ZOLOFT) 50 MG tablet, Take 1 tablet (50 mg total) by mouth once daily., Disp: 60 tablet, Rfl: 2    traZODone (DESYREL) 100 MG tablet, Take 1 tablet (100 mg total) by mouth every evening., Disp: 30 tablet, Rfl: 3    Current Facility-Administered Medications:     0.9%  NaCl infusion, , Intravenous, Continuous, Clif Acuna MD  Outpatient Medications Marked as Taking for the 2/5/24 encounter (Hospital Encounter) with [facility]   Medication Sig Dispense Refill    amLODIPine (NORVASC) 10 MG tablet Take 10 mg by mouth every evening.      ascorbic acid, vitamin C, (VITAMIN C) 250 MG tablet Take 250 mg by mouth once daily.      atorvastatin (LIPITOR) 80 MG tablet Take 80 mg by mouth every evening.      carvediloL (COREG) 25 MG tablet Take 25 mg by mouth once daily at 6am.      cloNIDine (CATAPRES) 0.1 MG tablet Take 0.1 mg by mouth 2 (two) times daily. For blood pressure >170      clopidogreL (PLAVIX) 75 mg tablet Take 75 mg by mouth once daily at 6am.      ezetimibe (ZETIA) 10 mg tablet Take 10 mg by mouth every evening.      glucosamine/chondr sy A sod (OSTEO BI-FLEX ORAL) Take 1 tablet by mouth once daily at 6am.      hydrALAZINE (APRESOLINE) 100 MG tablet Take 100 mg by mouth every 8 (eight) hours.      multivitamin with folic acid 400 mcg Tab Take 1 tablet by mouth once daily.      olmesartan (BENICAR) 40 MG tablet Take 40 mg by mouth every evening.      sertraline (ZOLOFT) 50 MG tablet Take 1 tablet (50 mg total) by mouth once daily. 60 tablet 2    traZODone (DESYREL) 100 MG tablet Take 1  tablet (100 mg total) by mouth every evening. 30 tablet 3     Social History     Tobacco Use    Smoking status: Never    Smokeless tobacco: Never   Substance Use Topics    Alcohol use: Not Currently    Drug use: Never         Vitals:    02/05/24 0612   BP: (!) 101/51   Pulse: 77   Temp: 97.7 °F (36.5 °C)     Gen NAD  HEENT NC/AT  CV RRR, no carotid bruits  Resp clear  GI soft  Ext no C/C/E    Neuro  AAOx4  Speech fluent, appropriate  EOMI, PERRLA, VFF, no papilledema  Normal facial strength, sensation  Tongue and palate midline  Motor 5/5  Sensation intact  Coord intact    Assessment: 78 M presents for a DSA to evaluate for right VA stenosis    Plan:   - DSA    I have discussed the risks, benefits, indications, and alternatives of the procedure in detail.  The patient verbalizes her understanding.  All questions answered.  Consents signed.  The patient agrees to proceed to proceed as planned.

## 2024-02-05 NOTE — DISCHARGE INSTRUCTIONS
-Remove dressing and armboard in 24 hrs.  -Can shower in 24 hrs, use soap and water only.  -No driving for two days.  -Do not lift anything heavier than a gallon of milk for 5 days.  -Do not submerge site under water for 5 days.  -No lotions, powders, creams around site for 5 days.  -Return to the nearest emergency room if you start running a fever, have any kind of discharge coming from the site, the site looks red or swollen.  -If the site starts to bleed, lay flat on the ground and apply pressure to the site and call 911.

## 2024-05-13 ENCOUNTER — CLINICAL SUPPORT (OUTPATIENT)
Dept: RESPIRATORY THERAPY | Facility: HOSPITAL | Age: 79
End: 2024-05-13
Attending: INTERNAL MEDICINE
Payer: COMMERCIAL

## 2024-05-13 ENCOUNTER — HOSPITAL ENCOUNTER (OUTPATIENT)
Dept: RADIOLOGY | Facility: HOSPITAL | Age: 79
Discharge: HOME | End: 2024-05-13
Attending: NURSE ANESTHETIST, CERTIFIED REGISTERED
Payer: COMMERCIAL

## 2024-05-13 ENCOUNTER — HOSPITAL ENCOUNTER (OUTPATIENT)
Dept: PREADMISSION TESTING | Facility: HOSPITAL | Age: 79
Discharge: HOME | End: 2024-05-13
Attending: INTERNAL MEDICINE
Payer: COMMERCIAL

## 2024-05-13 ENCOUNTER — ANESTHESIA EVENT (OUTPATIENT)
Dept: SURGERY | Facility: HOSPITAL | Age: 79
End: 2024-05-13
Payer: COMMERCIAL

## 2024-05-13 DIAGNOSIS — I42.9 CARDIOMYOPATHY, UNSPECIFIED TYPE: ICD-10-CM

## 2024-05-13 LAB
OHS QRS DURATION: 100 MS
OHS QTC CALCULATION: 430 MS

## 2024-05-13 PROCEDURE — 93005 ELECTROCARDIOGRAM TRACING: CPT

## 2024-05-13 PROCEDURE — 71046 X-RAY EXAM CHEST 2 VIEWS: CPT | Mod: TC

## 2024-05-13 NOTE — ANESTHESIA PREPROCEDURE EVALUATION
05/13/2024  Justyn Perez is a 78 y.o., male.      Pre-op Assessment    I have reviewed the Patient Summary Reports.     I have reviewed the Nursing Notes. I have reviewed the NPO Status.   I have reviewed the Medications.     Review of Systems  Anesthesia Hx:  No problems with previous Anesthesia             Denies Family Hx of Anesthesia complications.    Denies Personal Hx of Anesthesia complications.                    Social:  Non-Smoker       Cardiovascular:     Hypertension   CAD  asymptomatic     CHF        Cardiac clearance reviewed.                         Pulmonary:      Shortness of breath                  Renal/:  Chronic Renal Disease, CKD                Hepatic/GI:  Hepatic/GI Normal                 Musculoskeletal:  Musculoskeletal Normal                Neurological:  Neurology Normal                                      Endocrine:  Endocrine Normal            Psych:  Psychiatric Normal                    Physical Exam  General: Well nourished, Cooperative, Alert and Oriented    Airway:  Mallampati: II / II  Mouth Opening: Normal  TM Distance: Normal  Tongue: Normal  Neck ROM: Normal ROM    Dental:  Intact    Chest/Lungs:  Normal Respiratory Rate    Heart:  Rate: Normal    Musculoskeletal:  Normal mobility      Anesthesia Plan  Type of Anesthesia, risks & benefits discussed:    Anesthesia Type: MAC  Intra-op Monitoring Plan: Standard ASA Monitors  Post Op Pain Control Plan: multimodal analgesia  Induction:  IV  Informed Consent: Informed consent signed with the Patient and all parties understand the risks and agree with anesthesia plan.  All questions answered.   ASA Score: 3  Day of Surgery Review of History & Physical: H&P Update referred to the surgeon/provider.  Anesthesia Plan Notes: Anesthesia plan was discussed with patient and/or representative. Risks and alternatives were  discussed including the possibility of alteration of plan.     Ready For Surgery From Anesthesia Perspective.     .

## 2024-05-14 ENCOUNTER — ANESTHESIA (OUTPATIENT)
Dept: SURGERY | Facility: HOSPITAL | Age: 79
End: 2024-05-14
Payer: COMMERCIAL

## 2024-05-14 ENCOUNTER — HOSPITAL ENCOUNTER (OUTPATIENT)
Facility: HOSPITAL | Age: 79
Discharge: HOME | End: 2024-05-14
Attending: INTERNAL MEDICINE | Admitting: INTERNAL MEDICINE
Payer: COMMERCIAL

## 2024-05-14 VITALS
RESPIRATION RATE: 18 BRPM | OXYGEN SATURATION: 99 % | HEIGHT: 68 IN | BODY MASS INDEX: 22.83 KG/M2 | WEIGHT: 150.63 LBS | HEART RATE: 68 BPM | SYSTOLIC BLOOD PRESSURE: 141 MMHG | DIASTOLIC BLOOD PRESSURE: 75 MMHG | TEMPERATURE: 98 F

## 2024-05-14 DIAGNOSIS — R19.5 HEME POSITIVE STOOL: Primary | ICD-10-CM

## 2024-05-14 DIAGNOSIS — I42.9 CARDIOMYOPATHY, UNSPECIFIED TYPE: ICD-10-CM

## 2024-05-14 PROBLEM — I65.29 STENOSIS OF CAROTID ARTERY: Status: RESOLVED | Noted: 2022-08-03 | Resolved: 2024-05-14

## 2024-05-14 PROBLEM — R42 DIZZINESS: Status: RESOLVED | Noted: 2024-01-31 | Resolved: 2024-05-14

## 2024-05-14 LAB
BASOPHILS # BLD AUTO: 0.05 X10(3)/MCL
BASOPHILS NFR BLD AUTO: 0.7 %
CEA SERPL-MCNC: 5.21 NG/ML (ref 0–3)
EOSINOPHIL # BLD AUTO: 0.09 X10(3)/MCL (ref 0–0.9)
EOSINOPHIL NFR BLD AUTO: 1.2 %
ERYTHROCYTE [DISTWIDTH] IN BLOOD BY AUTOMATED COUNT: 15.7 % (ref 11.5–17)
HCT VFR BLD AUTO: 32.2 % (ref 42–52)
HGB BLD-MCNC: 10.3 G/DL (ref 14–18)
IMM GRANULOCYTES # BLD AUTO: 0.02 X10(3)/MCL (ref 0–0.04)
IMM GRANULOCYTES NFR BLD AUTO: 0.3 %
LYMPHOCYTES # BLD AUTO: 1.01 X10(3)/MCL (ref 0.6–4.6)
LYMPHOCYTES NFR BLD AUTO: 13.4 %
MCH RBC QN AUTO: 28.4 PG (ref 27–31)
MCHC RBC AUTO-ENTMCNC: 32 G/DL (ref 33–36)
MCV RBC AUTO: 88.7 FL (ref 80–94)
MONOCYTES # BLD AUTO: 0.47 X10(3)/MCL (ref 0.1–1.3)
MONOCYTES NFR BLD AUTO: 6.2 %
NEUTROPHILS # BLD AUTO: 5.91 X10(3)/MCL (ref 2.1–9.2)
NEUTROPHILS NFR BLD AUTO: 78.2 %
NRBC BLD AUTO-RTO: 0 %
PLATELET # BLD AUTO: 246 X10(3)/MCL (ref 130–400)
PMV BLD AUTO: 10.2 FL (ref 7.4–10.4)
RBC # BLD AUTO: 3.63 X10(6)/MCL (ref 4.7–6.1)
WBC # SPEC AUTO: 7.55 X10(3)/MCL (ref 4.5–11.5)

## 2024-05-14 PROCEDURE — C1769 GUIDE WIRE: HCPCS | Performed by: INTERNAL MEDICINE

## 2024-05-14 PROCEDURE — 82378 CARCINOEMBRYONIC ANTIGEN: CPT | Performed by: INTERNAL MEDICINE

## 2024-05-14 PROCEDURE — 25000003 PHARM REV CODE 250: Performed by: NURSE ANESTHETIST, CERTIFIED REGISTERED

## 2024-05-14 PROCEDURE — 27201423 OPTIME MED/SURG SUP & DEVICES STERILE SUPPLY: Performed by: INTERNAL MEDICINE

## 2024-05-14 PROCEDURE — 85025 COMPLETE CBC W/AUTO DIFF WBC: CPT | Performed by: INTERNAL MEDICINE

## 2024-05-14 PROCEDURE — 45381 COLONOSCOPY SUBMUCOUS NJX: CPT | Mod: PT | Performed by: INTERNAL MEDICINE

## 2024-05-14 PROCEDURE — 45385 COLONOSCOPY W/LESION REMOVAL: CPT | Mod: PT | Performed by: INTERNAL MEDICINE

## 2024-05-14 PROCEDURE — 45380 COLONOSCOPY AND BIOPSY: CPT | Mod: PT,59 | Performed by: INTERNAL MEDICINE

## 2024-05-14 PROCEDURE — D9220AH HC ANESTHESIA PROFESSIONAL FEE: Mod: QZ,P3,QS | Performed by: NURSE ANESTHETIST, CERTIFIED REGISTERED

## 2024-05-14 PROCEDURE — 83540 ASSAY OF IRON: CPT | Performed by: INTERNAL MEDICINE

## 2024-05-14 PROCEDURE — 37000008 HC ANESTHESIA 1ST 15 MINUTES: Performed by: INTERNAL MEDICINE

## 2024-05-14 PROCEDURE — 63600175 PHARM REV CODE 636 W HCPCS: Performed by: NURSE ANESTHETIST, CERTIFIED REGISTERED

## 2024-05-14 PROCEDURE — 36415 COLL VENOUS BLD VENIPUNCTURE: CPT | Performed by: INTERNAL MEDICINE

## 2024-05-14 PROCEDURE — 37000009 HC ANESTHESIA EA ADD 15 MINS: Performed by: INTERNAL MEDICINE

## 2024-05-14 PROCEDURE — 00811 ANES LWR INTST NDSC NOS: CPT | Mod: QZ,P3,QS | Performed by: NURSE ANESTHETIST, CERTIFIED REGISTERED

## 2024-05-14 RX ORDER — CLOPIDOGREL BISULFATE 75 MG/1
75 TABLET ORAL
OUTPATIENT
Start: 2024-05-14

## 2024-05-14 RX ORDER — LIDOCAINE HYDROCHLORIDE 10 MG/ML
INJECTION, SOLUTION EPIDURAL; INFILTRATION; INTRACAUDAL; PERINEURAL
Status: DISCONTINUED | OUTPATIENT
Start: 2024-05-14 | End: 2024-05-14

## 2024-05-14 RX ORDER — SODIUM CHLORIDE 9 MG/ML
INJECTION, SOLUTION INTRAVENOUS CONTINUOUS
Status: DISCONTINUED | OUTPATIENT
Start: 2024-05-14 | End: 2024-05-14 | Stop reason: HOSPADM

## 2024-05-14 RX ORDER — PROPOFOL 10 MG/ML
VIAL (ML) INTRAVENOUS
Status: DISCONTINUED | OUTPATIENT
Start: 2024-05-14 | End: 2024-05-14

## 2024-05-14 RX ADMIN — PROPOFOL 25 MG: 10 INJECTION, EMULSION INTRAVENOUS at 08:05

## 2024-05-14 RX ADMIN — PROPOFOL 50 MG: 10 INJECTION, EMULSION INTRAVENOUS at 08:05

## 2024-05-14 RX ADMIN — SODIUM CHLORIDE: 9 INJECTION, SOLUTION INTRAVENOUS at 07:05

## 2024-05-14 RX ADMIN — LIDOCAINE HYDROCHLORIDE 20 MG: 10 INJECTION, SOLUTION EPIDURAL; INFILTRATION; INTRACAUDAL; PERINEURAL at 08:05

## 2024-05-14 NOTE — DISCHARGE INSTRUCTIONS
Do not drive, operate heavy machinery or sign important documents for the next 24 hours.    Dr Bassett's office will call you within 5-10 days with your test results.    Depending on pathology results, you will repeat colonoscopy in 5 vs 10 years.    Resume normal eating and home medications.      Stay out of the heat and drink plenty of fluids for the next 24 hours.    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 Dr Bassett's office will help schedule an appointment with a surgeon of your choice.

## 2024-05-14 NOTE — ANESTHESIA POSTPROCEDURE EVALUATION
Anesthesia Post Evaluation    Patient: Justyn Perez    Procedure(s) Performed: Procedure(s) (LRB):  COLONOSCOPY (N/A)    Final Anesthesia Type: MAC      Patient participation: Yes- Able to Participate  Level of consciousness: awake and alert  Post-procedure vital signs: reviewed and stable  Pain management: adequate  Airway patency: patent    PONV status at discharge: No PONV  Anesthetic complications: no      Cardiovascular status: blood pressure returned to baseline  Respiratory status: unassisted  Hydration status: euvolemic  Follow-up not needed.              Vitals Value Taken Time   /76 05/14/24 0900   Temp 36.7 °C (98 °F) 05/14/24 0900   Pulse 65 05/14/24 0900   Resp 16 05/14/24 0859   SpO2 98 % 05/14/24 0900         No case tracking events are documented in the log.      Pain/Aidee Score: No data recorded

## 2024-05-14 NOTE — DISCHARGE SUMMARY
[facility]  Discharge Note  Short Stay    Procedure(s) (LRB):  COLONOSCOPY (N/A)      OUTCOME: Patient tolerated treatment/procedure well without complication and is now ready for discharge.    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 Date: 05/13/25     Order Specific Question Answer Comments   May the Radiologist modify the order per protocol to meet the clinical needs of the patient? Yes    Release to patient Immediate      Ambulatory referral/consult to Cardiology   Standing Status: Future   Referral Priority: Routine Referral Type: Consultation   Referral Reason: Specialty Services Required   Requested Specialty: Cardiology   Number of Visits Requested: 1     EKG 12-lead   Standing Status: Future Number of Occurrences: 1 Standing Exp. Date: 05/13/25        TIME SPENT ON DISCHARGE:  10 minutes

## 2024-05-14 NOTE — OP NOTE
[facility]  Colon Procedure  Operative Note    SUMMARY     Date of Procedure: 2024     Procedure: Procedure(s) (LRB):  COLONOSCOPY (N/A)    Surgeons and Role:     * Jose C Bassett MD - Primary    Assisting Surgeon: None    Patient Name: Justyn Perez      Patient location: OPS    Patient : 1945    Patient Gender: male    Referring Physician: Rodney Jalloh     Anesthesia Provider: No responsible provider has been recorded for the case.     Pre-Operative Diagnosis: Heme positive stool [R19.5]    Post-Operative Diagnosis: Post-Op Diagnosis Codes:     * Heme positive stool [R19.5]       Indications:  Diagnostic colonoscopy, heme-positive stool      Procedure:   This is a patient undergoing a diagnostic colonoscopy. Prior colonoscopy was preformed on over 10 years ago. The procedure, indications, preparation and potential complications were explained to the patient, who indicated understanding and signed the corresponding consent forms. Patient identifications and proposed procedure were verified by the physician, the nurse and the anesthesia staff in the endoscopy suite. IV sedation was administered by the nurse anesthetist. Continuous pulse oximetry and blood pressure monitoring were used throughout the procedure. Supplemental oxygen was used. The quality of preparation was excellent. Patient was placed in left lateral decubitus position. Digital  exam was unremarkable with a normal prostate.  There was an external hemorrhoid at the right posterior position that was somewhat inflamed. The colonoscope was introduced through the rectum and advanced under direct visualization until cecum reached. The appendiceal orifice and the ileocecal valve were identified. The terminal ileum was identified. Patience tolerance to procedure was good. The procedure was not difficult.      Limitations/Complications:  None  Complications: No    Findings:                 Colon:   Colonoscopy was accomplished without much difficulty.  Prep was thought to be fair with some scattered stool.  The most notable finding was a carcinomatous appearing lesion there was seemingly at the splenic flexure.  This was biopsied extensively and then marked with tattoo ink.  There were scattered polyps throughout the colon.  There was 1 in the cecum approximately a 6 mm another polyp on the ileocecal valve that was piecemeal had to be lifted to remove there were several polyps in the descending and sigmoid colon 1 area was clipped the rest were removed with the cold snare technique.  There were a few diverticula noted in the sigmoid and descending.        Other Interventions:  Clip was used to close the 1 polypectomy site and lift was used to remove the polyp on the ileocecal valve which was piecemeal.  The remainder of the polyps were removed with cold snare technique.  The carcinomatous appearing lesion covered circumference of the colon and was not obstructing and was biopsied extensively.    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  We will check a CEA and CBC today.  We will make plans for referral to surgery.    Additional Notes:   Blood loss minimal                Jose C Bassett MD  5/14/2024

## 2024-05-16 LAB — PSYCHE PATHOLOGY RESULT: NORMAL

## 2024-05-17 LAB
IRON SATN MFR SERPL: 14 % (ref 20–50)
IRON SERPL-MCNC: 43 UG/DL (ref 65–175)
TIBC SERPL-MCNC: 261 UG/DL (ref 69–240)
TIBC SERPL-MCNC: 304 UG/DL (ref 250–450)
TRANSFERRIN SERPL-MCNC: 286 MG/DL (ref 163–344)

## 2024-06-19 DIAGNOSIS — R93.89 ABNORMAL CT SCAN: ICD-10-CM

## 2024-06-19 DIAGNOSIS — N28.89 KIDNEY MASS: ICD-10-CM

## 2024-06-19 DIAGNOSIS — K63.89 HEPATIC FLEXURE MASS: ICD-10-CM

## 2024-06-19 DIAGNOSIS — D12.7 ADENOMA DETERMINED BY COLORECTAL BIOPSY: Primary | ICD-10-CM

## 2024-07-05 ENCOUNTER — HOSPITAL ENCOUNTER (OUTPATIENT)
Dept: RADIOLOGY | Facility: HOSPITAL | Age: 79
Discharge: HOME | End: 2024-07-05
Attending: INTERNAL MEDICINE
Payer: MEDICARE

## 2024-07-05 DIAGNOSIS — R93.89 ABNORMAL CT SCAN: ICD-10-CM

## 2024-07-05 DIAGNOSIS — D12.7 ADENOMA DETERMINED BY COLORECTAL BIOPSY: ICD-10-CM

## 2024-07-05 DIAGNOSIS — K63.89 HEPATIC FLEXURE MASS: ICD-10-CM

## 2024-07-05 DIAGNOSIS — N28.89 KIDNEY MASS: ICD-10-CM

## 2024-07-05 PROCEDURE — A9552 F18 FDG: HCPCS | Performed by: INTERNAL MEDICINE

## 2024-07-05 PROCEDURE — 78815 PET IMAGE W/CT SKULL-THIGH: CPT | Mod: TC

## 2024-07-05 RX ORDER — FLUDEOXYGLUCOSE F18 500 MCI/ML
10 INJECTION INTRAVENOUS
Status: COMPLETED | OUTPATIENT
Start: 2024-07-05 | End: 2024-07-05

## 2024-07-05 RX ADMIN — FLUDEOXYGLUCOSE F-18 10.4 MILLICURIE: 500 INJECTION INTRAVENOUS at 12:07

## 2024-08-01 ENCOUNTER — HOSPITAL ENCOUNTER (INPATIENT)
Facility: HOSPITAL | Age: 79
LOS: 1 days | Discharge: ANOTHER HOSPITAL | DRG: 281 | End: 2024-08-02
Attending: INTERNAL MEDICINE | Admitting: INTERNAL MEDICINE
Payer: MEDICARE

## 2024-08-01 DIAGNOSIS — R07.9 CHEST PAIN: ICD-10-CM

## 2024-08-01 DIAGNOSIS — I21.4 NSTEMI (NON-ST ELEVATED MYOCARDIAL INFARCTION): ICD-10-CM

## 2024-08-01 DIAGNOSIS — R19.5 HEME POSITIVE STOOL: Primary | ICD-10-CM

## 2024-08-01 LAB
ALBUMIN SERPL-MCNC: 2.5 G/DL (ref 3.4–4.8)
ALBUMIN/GLOB SERPL: 1.1 RATIO (ref 1.1–2)
ALP SERPL-CCNC: 70 UNIT/L (ref 40–150)
ALT SERPL-CCNC: 20 UNIT/L (ref 0–55)
ANION GAP SERPL CALC-SCNC: 8 MEQ/L
AST SERPL-CCNC: 25 UNIT/L (ref 5–34)
BASOPHILS # BLD AUTO: 0.04 X10(3)/MCL
BASOPHILS NFR BLD AUTO: 0.2 %
BILIRUB SERPL-MCNC: 0.6 MG/DL
BUN SERPL-MCNC: 29.2 MG/DL (ref 8.4–25.7)
CALCIUM SERPL-MCNC: 8.4 MG/DL (ref 8.8–10)
CHLORIDE SERPL-SCNC: 110 MMOL/L (ref 98–107)
CO2 SERPL-SCNC: 22 MMOL/L (ref 23–31)
CREAT SERPL-MCNC: 1.32 MG/DL (ref 0.73–1.18)
CREAT/UREA NIT SERPL: 22
EOSINOPHIL # BLD AUTO: 0.02 X10(3)/MCL (ref 0–0.9)
EOSINOPHIL NFR BLD AUTO: 0.1 %
ERYTHROCYTE [DISTWIDTH] IN BLOOD BY AUTOMATED COUNT: 17.1 % (ref 11.5–17)
GFR SERPLBLD CREATININE-BSD FMLA CKD-EPI: 55 ML/MIN/1.73/M2
GLOBULIN SER-MCNC: 2.2 GM/DL (ref 2.4–3.5)
GLUCOSE SERPL-MCNC: 105 MG/DL (ref 82–115)
HCT VFR BLD AUTO: 28 % (ref 42–52)
HGB BLD-MCNC: 8.9 G/DL (ref 14–18)
IMM GRANULOCYTES # BLD AUTO: 0.11 X10(3)/MCL (ref 0–0.04)
IMM GRANULOCYTES NFR BLD AUTO: 0.7 %
LYMPHOCYTES # BLD AUTO: 0.76 X10(3)/MCL (ref 0.6–4.6)
LYMPHOCYTES NFR BLD AUTO: 4.6 %
MAGNESIUM SERPL-MCNC: 2 MG/DL (ref 1.6–2.6)
MCH RBC QN AUTO: 28.3 PG (ref 27–31)
MCHC RBC AUTO-ENTMCNC: 31.8 G/DL (ref 33–36)
MCV RBC AUTO: 89.2 FL (ref 80–94)
MONOCYTES # BLD AUTO: 0.9 X10(3)/MCL (ref 0.1–1.3)
MONOCYTES NFR BLD AUTO: 5.5 %
NEUTROPHILS # BLD AUTO: 14.58 X10(3)/MCL (ref 2.1–9.2)
NEUTROPHILS NFR BLD AUTO: 88.9 %
NRBC BLD AUTO-RTO: 0 %
PHOSPHATE SERPL-MCNC: 2.7 MG/DL (ref 2.3–4.7)
PLATELET # BLD AUTO: 167 X10(3)/MCL (ref 130–400)
PLATELETS.RETICULATED NFR BLD AUTO: 3.5 % (ref 0.9–11.2)
PMV BLD AUTO: 10.8 FL (ref 7.4–10.4)
POTASSIUM SERPL-SCNC: 4.1 MMOL/L (ref 3.5–5.1)
PROT SERPL-MCNC: 4.7 GM/DL (ref 5.8–7.6)
RBC # BLD AUTO: 3.14 X10(6)/MCL (ref 4.7–6.1)
SODIUM SERPL-SCNC: 140 MMOL/L (ref 136–145)
TROPONIN I SERPL-MCNC: 0.69 NG/ML (ref 0–0.04)
TROPONIN I SERPL-MCNC: 0.7 NG/ML (ref 0–0.04)
TROPONIN I SERPL-MCNC: 0.7 NG/ML (ref 0–0.04)
WBC # BLD AUTO: 16.41 X10(3)/MCL (ref 4.5–11.5)

## 2024-08-01 PROCEDURE — 63600175 PHARM REV CODE 636 W HCPCS: Performed by: INTERNAL MEDICINE

## 2024-08-01 PROCEDURE — 25000003 PHARM REV CODE 250: Performed by: INTERNAL MEDICINE

## 2024-08-01 PROCEDURE — 84100 ASSAY OF PHOSPHORUS: CPT | Performed by: NURSE PRACTITIONER

## 2024-08-01 PROCEDURE — 51702 INSERT TEMP BLADDER CATH: CPT

## 2024-08-01 PROCEDURE — 84484 ASSAY OF TROPONIN QUANT: CPT | Performed by: NURSE PRACTITIONER

## 2024-08-01 PROCEDURE — 80053 COMPREHEN METABOLIC PANEL: CPT | Performed by: NURSE PRACTITIONER

## 2024-08-01 PROCEDURE — 99222 1ST HOSP IP/OBS MODERATE 55: CPT | Mod: GC,,, | Performed by: SURGERY

## 2024-08-01 PROCEDURE — 25000003 PHARM REV CODE 250: Performed by: NURSE PRACTITIONER

## 2024-08-01 PROCEDURE — 85025 COMPLETE CBC W/AUTO DIFF WBC: CPT | Performed by: NURSE PRACTITIONER

## 2024-08-01 PROCEDURE — 63600175 PHARM REV CODE 636 W HCPCS: Mod: JZ,JG | Performed by: INTERNAL MEDICINE

## 2024-08-01 PROCEDURE — 21400001 HC TELEMETRY ROOM

## 2024-08-01 PROCEDURE — 83735 ASSAY OF MAGNESIUM: CPT | Performed by: NURSE PRACTITIONER

## 2024-08-01 PROCEDURE — 94760 N-INVAS EAR/PLS OXIMETRY 1: CPT

## 2024-08-01 PROCEDURE — 27000221 HC OXYGEN, UP TO 24 HOURS

## 2024-08-01 PROCEDURE — 36415 COLL VENOUS BLD VENIPUNCTURE: CPT | Performed by: NURSE PRACTITIONER

## 2024-08-01 PROCEDURE — 63600175 PHARM REV CODE 636 W HCPCS: Performed by: NURSE PRACTITIONER

## 2024-08-01 RX ORDER — LABETALOL HYDROCHLORIDE 5 MG/ML
10 INJECTION, SOLUTION INTRAVENOUS EVERY 4 HOURS PRN
Status: DISCONTINUED | OUTPATIENT
Start: 2024-08-01 | End: 2024-08-02

## 2024-08-01 RX ORDER — ALUMINUM HYDROXIDE, MAGNESIUM HYDROXIDE, AND SIMETHICONE 1200; 120; 1200 MG/30ML; MG/30ML; MG/30ML
30 SUSPENSION ORAL 4 TIMES DAILY PRN
Status: DISCONTINUED | OUTPATIENT
Start: 2024-08-01 | End: 2024-08-02 | Stop reason: HOSPADM

## 2024-08-01 RX ORDER — METRONIDAZOLE 500 MG/100ML
500 INJECTION, SOLUTION INTRAVENOUS
Status: DISCONTINUED | OUTPATIENT
Start: 2024-08-01 | End: 2024-08-02 | Stop reason: HOSPADM

## 2024-08-01 RX ORDER — CIPROFLOXACIN 2 MG/ML
400 INJECTION, SOLUTION INTRAVENOUS
Status: DISCONTINUED | OUTPATIENT
Start: 2024-08-01 | End: 2024-08-01

## 2024-08-01 RX ORDER — CLONIDINE HYDROCHLORIDE 0.1 MG/1
0.1 TABLET ORAL 2 TIMES DAILY
Status: DISCONTINUED | OUTPATIENT
Start: 2024-08-01 | End: 2024-08-02 | Stop reason: HOSPADM

## 2024-08-01 RX ORDER — NAPROXEN SODIUM 220 MG/1
81 TABLET, FILM COATED ORAL DAILY
Status: DISCONTINUED | OUTPATIENT
Start: 2024-08-02 | End: 2024-08-02 | Stop reason: HOSPADM

## 2024-08-01 RX ORDER — AMLODIPINE BESYLATE 5 MG/1
10 TABLET ORAL NIGHTLY
Status: DISCONTINUED | OUTPATIENT
Start: 2024-08-01 | End: 2024-08-02 | Stop reason: HOSPADM

## 2024-08-01 RX ORDER — TALC
6 POWDER (GRAM) TOPICAL NIGHTLY PRN
Status: DISCONTINUED | OUTPATIENT
Start: 2024-08-01 | End: 2024-08-02 | Stop reason: HOSPADM

## 2024-08-01 RX ORDER — ONDANSETRON HYDROCHLORIDE 2 MG/ML
4 INJECTION, SOLUTION INTRAVENOUS EVERY 4 HOURS PRN
Status: DISCONTINUED | OUTPATIENT
Start: 2024-08-01 | End: 2024-08-02 | Stop reason: HOSPADM

## 2024-08-01 RX ORDER — ACETAMINOPHEN 325 MG/1
650 TABLET ORAL EVERY 6 HOURS PRN
Status: DISCONTINUED | OUTPATIENT
Start: 2024-08-01 | End: 2024-08-02 | Stop reason: HOSPADM

## 2024-08-01 RX ORDER — CIPROFLOXACIN 2 MG/ML
400 INJECTION, SOLUTION INTRAVENOUS
Status: DISCONTINUED | OUTPATIENT
Start: 2024-08-01 | End: 2024-08-02 | Stop reason: HOSPADM

## 2024-08-01 RX ORDER — ATORVASTATIN CALCIUM 40 MG/1
80 TABLET, FILM COATED ORAL NIGHTLY
Status: DISCONTINUED | OUTPATIENT
Start: 2024-08-01 | End: 2024-08-02 | Stop reason: HOSPADM

## 2024-08-01 RX ORDER — CLONIDINE HYDROCHLORIDE 0.2 MG/1
0.2 TABLET ORAL 3 TIMES DAILY PRN
Status: DISCONTINUED | OUTPATIENT
Start: 2024-08-01 | End: 2024-08-02 | Stop reason: HOSPADM

## 2024-08-01 RX ORDER — CARVEDILOL 12.5 MG/1
25 TABLET ORAL 2 TIMES DAILY WITH MEALS
Status: DISCONTINUED | OUTPATIENT
Start: 2024-08-01 | End: 2024-08-02 | Stop reason: HOSPADM

## 2024-08-01 RX ORDER — TRAZODONE HYDROCHLORIDE 100 MG/1
100 TABLET ORAL NIGHTLY
Status: DISCONTINUED | OUTPATIENT
Start: 2024-08-01 | End: 2024-08-02 | Stop reason: HOSPADM

## 2024-08-01 RX ORDER — MUPIROCIN 20 MG/G
OINTMENT TOPICAL 2 TIMES DAILY
Status: DISCONTINUED | OUTPATIENT
Start: 2024-08-01 | End: 2024-08-02 | Stop reason: HOSPADM

## 2024-08-01 RX ORDER — ALPRAZOLAM 0.5 MG/1
0.5 TABLET ORAL 3 TIMES DAILY PRN
Status: DISCONTINUED | OUTPATIENT
Start: 2024-08-01 | End: 2024-08-02 | Stop reason: HOSPADM

## 2024-08-01 RX ORDER — HYDRALAZINE HYDROCHLORIDE 50 MG/1
100 TABLET, FILM COATED ORAL 2 TIMES DAILY
Status: DISCONTINUED | OUTPATIENT
Start: 2024-08-01 | End: 2024-08-02

## 2024-08-01 RX ORDER — ENOXAPARIN SODIUM 100 MG/ML
40 INJECTION SUBCUTANEOUS EVERY 24 HOURS
Status: DISCONTINUED | OUTPATIENT
Start: 2024-08-01 | End: 2024-08-02 | Stop reason: HOSPADM

## 2024-08-01 RX ORDER — SIMETHICONE 80 MG
1 TABLET,CHEWABLE ORAL 4 TIMES DAILY PRN
Status: DISCONTINUED | OUTPATIENT
Start: 2024-08-01 | End: 2024-08-02 | Stop reason: HOSPADM

## 2024-08-01 RX ORDER — POLYETHYLENE GLYCOL 3350 17 G/17G
17 POWDER, FOR SOLUTION ORAL 2 TIMES DAILY PRN
Status: DISCONTINUED | OUTPATIENT
Start: 2024-08-01 | End: 2024-08-02 | Stop reason: HOSPADM

## 2024-08-01 RX ORDER — HYDRALAZINE HYDROCHLORIDE 20 MG/ML
20 INJECTION INTRAMUSCULAR; INTRAVENOUS EVERY 4 HOURS PRN
Status: DISCONTINUED | OUTPATIENT
Start: 2024-08-01 | End: 2024-08-02 | Stop reason: HOSPADM

## 2024-08-01 RX ORDER — HYDROXYZINE 50 MG/ML
50 INJECTION, SOLUTION INTRAMUSCULAR EVERY 6 HOURS PRN
Status: DISCONTINUED | OUTPATIENT
Start: 2024-08-01 | End: 2024-08-02 | Stop reason: HOSPADM

## 2024-08-01 RX ORDER — NAPROXEN SODIUM 220 MG/1
81 TABLET, FILM COATED ORAL DAILY
COMMUNITY

## 2024-08-01 RX ORDER — CLOPIDOGREL BISULFATE 75 MG/1
75 TABLET ORAL DAILY
Status: DISCONTINUED | OUTPATIENT
Start: 2024-08-02 | End: 2024-08-02 | Stop reason: HOSPADM

## 2024-08-01 RX ORDER — MORPHINE SULFATE 4 MG/ML
4 INJECTION, SOLUTION INTRAMUSCULAR; INTRAVENOUS EVERY 4 HOURS PRN
Status: DISCONTINUED | OUTPATIENT
Start: 2024-08-01 | End: 2024-08-02 | Stop reason: HOSPADM

## 2024-08-01 RX ORDER — PROCHLORPERAZINE EDISYLATE 5 MG/ML
5 INJECTION INTRAMUSCULAR; INTRAVENOUS EVERY 6 HOURS PRN
Status: DISCONTINUED | OUTPATIENT
Start: 2024-08-01 | End: 2024-08-02 | Stop reason: HOSPADM

## 2024-08-01 RX ORDER — NALOXONE HCL 0.4 MG/ML
0.02 VIAL (ML) INJECTION
Status: DISCONTINUED | OUTPATIENT
Start: 2024-08-01 | End: 2024-08-02 | Stop reason: HOSPADM

## 2024-08-01 RX ORDER — SERTRALINE HYDROCHLORIDE 50 MG/1
50 TABLET, FILM COATED ORAL DAILY
Status: DISCONTINUED | OUTPATIENT
Start: 2024-08-02 | End: 2024-08-02 | Stop reason: HOSPADM

## 2024-08-01 RX ADMIN — ENOXAPARIN SODIUM 40 MG: 40 INJECTION SUBCUTANEOUS at 04:08

## 2024-08-01 RX ADMIN — ATORVASTATIN CALCIUM 80 MG: 40 TABLET, FILM COATED ORAL at 09:08

## 2024-08-01 RX ADMIN — MUPIROCIN: 20 OINTMENT TOPICAL at 09:08

## 2024-08-01 RX ADMIN — CIPROFLOXACIN 400 MG: 200 INJECTION, SOLUTION INTRAVENOUS at 09:08

## 2024-08-01 RX ADMIN — LABETALOL HYDROCHLORIDE 10 MG: 5 INJECTION, SOLUTION INTRAVENOUS at 12:08

## 2024-08-01 RX ADMIN — HYDRALAZINE HYDROCHLORIDE 100 MG: 50 TABLET ORAL at 09:08

## 2024-08-01 RX ADMIN — TRAZODONE HYDROCHLORIDE 100 MG: 100 TABLET ORAL at 09:08

## 2024-08-01 RX ADMIN — CARVEDILOL 25 MG: 12.5 TABLET, FILM COATED ORAL at 09:08

## 2024-08-01 RX ADMIN — CLONIDINE HYDROCHLORIDE 0.1 MG: 0.1 TABLET ORAL at 09:08

## 2024-08-01 RX ADMIN — HYDRALAZINE HYDROCHLORIDE 20 MG: 20 INJECTION INTRAMUSCULAR; INTRAVENOUS at 11:08

## 2024-08-01 RX ADMIN — HYDRALAZINE HYDROCHLORIDE 20 MG: 20 INJECTION INTRAMUSCULAR; INTRAVENOUS at 03:08

## 2024-08-01 RX ADMIN — MORPHINE SULFATE 4 MG: 4 INJECTION, SOLUTION INTRAMUSCULAR; INTRAVENOUS at 02:08

## 2024-08-01 RX ADMIN — AMLODIPINE BESYLATE 10 MG: 5 TABLET ORAL at 09:08

## 2024-08-01 RX ADMIN — METRONIDAZOLE 500 MG: 500 INJECTION, SOLUTION INTRAVENOUS at 08:08

## 2024-08-01 NOTE — NURSING
Nurses Note -- 4 Eyes      8/1/2024   8:00 AM      Skin assessed during: Shift assessment.      [x] No Altered Skin Integrity Present    [x]Prevention Measures Documented      [] Yes- Altered Skin Integrity Present or Discovered   [] LDA Added if Not in Epic (Describe Wound)   [] New Altered Skin Integrity was Present on Admit and Documented in LDA   [] Wound Image Taken    Wound Care Consulted? No    Attending Nurse:  Lawson Valdivia RN/Staff Member:   Mei

## 2024-08-01 NOTE — H&P
"[facility] Hospital Medicine History & Physical Examination       Patient Name: Justyn Perez  MRN: [id number]  Patient Class: IP- Inpatient   Admission Date: 8/1/2024   Admitting Physician: [facility]   Length of Stay: 0  Attending Physician: Dr. Lissette Ramirez  Primary Care Provider: Rodney Jalloh NP  Face-to-Face encounter date: 08/01/2024  Code Status: Full code  Chief Complaint: chest pain      Screening for Social Drivers for health:  Patient screened for food insecurity, housing instability, transportation needs, utility difficulties, and interpersonal safety (select all that apply as identified as concern)  []Housing or Food  []Transportation Needs  []Utility Difficulties  []Interpersonal safety  [x]None    Patient information was obtained from patient, patient's family, past medical records and/or ER records.     HISTORY OF PRESENT ILLNESS:   Justyn Perez is a 78 y.o. male who PMH includes aortic stenosis, CKD, CAD, PVD, PAD s/p aortobifemoral bypass, valvular heart disease s/p TAVR, pacemaker/AICD, CHF; presented transferred from [facility] for cardiology services for NSTEMI and chest pain. PT was admitted there for cholecystectomy and alessandro colectomy for colon tumor done by Dr Jaden Lara.  PT reports tumor "may be cancer" but he does not have the pathology results yet. PT began to have chest pain, no cardiology services available in outlying facility, pt was transferred to cardiology services and higher level of care.  Lab work reviewed demonstrated WBCs 16.41, H&H 8.9/28.0, chloride 110, CO2 22, BUN 29.2, creatinine 1.32, troponin 0.686 will repeat value 0.700; other indices unremarkable.  Initial vital signs /84 pulse 81 respirations 16 temperature 98.6° F O2 saturation 96% on 2 L per nasal cannula.  Patient is admitted to hospital medicine services for further management.  Cardiology Services have been consulted.    PAST MEDICAL " HISTORY:     Past Medical History:   Diagnosis Date    Aortic stenosis     Carpal tunnel syndrome     CKD (chronic kidney disease)     Coronary artery disease     Disorder of kidney and ureter     Dyslipidemia     Hyperlipidemia     Hypertension     IGT (impaired glucose tolerance)     PVD (peripheral vascular disease)        PAST SURGICAL HISTORY:     Past Surgical History:   Procedure Laterality Date    CARDIAC VALVE REPLACEMENT      CAROTID STENT      COLONOSCOPY N/A 2024    Procedure: COLONOSCOPY;  Surgeon: Jose C Bassett MD;  Location: [facility];  Service: Gastroenterology;  Laterality: N/A;    femoral-popliteal artery bypass      Renal artery angiogram      TAVR-TF  2020    TONSILLECTOMY         ALLERGIES:   Penicillins    FAMILY HISTORY:   Reviewed and negative    SOCIAL HISTORY:     Social History     Tobacco Use    Smoking status: Former     Types: Cigarettes     Start date:      Quit date:      Years since quittin.6    Smokeless tobacco: Never   Substance Use Topics    Alcohol use: Not Currently        HOME MEDICATIONS:   As documented  Prior to Admission medications    Medication Sig Start Date End Date Taking? Authorizing Provider   amLODIPine (NORVASC) 10 MG tablet Take 10 mg by mouth every evening. 22  Yes Provider, Historical   aspirin 81 MG Chew Take 81 mg by mouth once daily.   Yes Provider, Historical   atorvastatin (LIPITOR) 80 MG tablet Take 80 mg by mouth every evening. 22  Yes Provider, Historical   carvediloL (COREG) 25 MG tablet Take 25 mg by mouth once daily at 6am. 22  Yes Provider, Historical   cloNIDine (CATAPRES) 0.1 MG tablet Take 0.1 mg by mouth 2 (two) times daily. For blood pressure >170 22  Yes Provider, Historical   clopidogreL (PLAVIX) 75 mg tablet Take 75 mg by mouth once daily at 6am. 5/10/22  Yes Provider, Historical   glucosamine/chondr sy A sod (OSTEO BI-FLEX ORAL) Take 1 tablet by mouth once daily at 6am.   Yes Provider,  Historical   hydrALAZINE (APRESOLINE) 100 MG tablet Take 100 mg by mouth every 8 (eight) hours.   Yes Provider, Historical   olmesartan (BENICAR) 40 MG tablet Take 40 mg by mouth every evening. 10/24/22  Yes Provider, Historical   sertraline (ZOLOFT) 50 MG tablet Take 1 tablet (50 mg total) by mouth once daily. 11/29/22  Yes Ro Suarez MD   traZODone (DESYREL) 100 MG tablet Take 1 tablet (100 mg total) by mouth every evening. 5/23/23  Yes Ro Suarez MD   ascorbic acid, vitamin C, (VITAMIN C) 250 MG tablet Take 250 mg by mouth once daily.    Provider, Historical   ezetimibe (ZETIA) 10 mg tablet Take 10 mg by mouth every evening. 4/18/22   Provider, Historical   multivitamin with folic acid 400 mcg Tab Take 1 tablet by mouth once daily.    Provider, Historical       REVIEW OF SYSTEMS:   Except as documented, all other systems reviewed and negative     PHYSICAL EXAM:     VITAL SIGNS: 24 HRS MIN & MAX LAST   Temp  Min: 98.2 °F (36.8 °C)  Max: 98.6 °F (37 °C) 98.2 °F (36.8 °C)   BP  Min: 143/84  Max: 183/71 (!) 183/71   Pulse  Min: 81  Max: 81  81   Resp  Min: 16  Max: 20 20   SpO2  Min: 95 %  Max: 96 % 95 %       General appearance: chronically ill appearing, fatigued, non-toxic, elderly male; no family at bedside  HENT: Atraumatic head. Moist mucous membranes of oral cavity.  Eyes: PERRL  Lungs: diminished bilaterally. No wheezing present.   Heart: Regular rate and rhythm. S1 and S2 present with no murmurs/gallop/rub. No pedal edema. No JVD present.   Abdomen: Soft, non-distended, generalized TTP,  Bowel sounds are normal, NG tube  Extremities: No cyanosis, clubbing, or edema.  Skin: warm and dry  Neuro: oriented x 3 no acute focal deficits  Psych/mental status: flat affect, cooperative    LABS AND IMAGING:     Recent Labs   Lab 08/01/24  0616   WBC 16.41*   RBC 3.14*   HGB 8.9*   HCT 28.0*   MCV 89.2   MCH 28.3   MCHC 31.8*   RDW 17.1*      MPV 10.8*       Recent Labs   Lab 08/01/24  0616       K 4.1   *   CO2 22*   BUN 29.2*   CREATININE 1.32*   CALCIUM 8.4*   MG 2.00   ALBUMIN 2.5*   ALKPHOS 70   ALT 20   AST 25   BILITOT 0.6       Microbiology Results (last 7 days)       ** No results found for the last 168 hours. **             NM PET CT FDG Skull Base to Mid Thigh  Narrative: EXAMINATION:  NM PET CT FDG SKULL BASE TO MID THIGH    CLINICAL HISTORY:  d12.7; Benign neoplasm of rectosigmoid junction    TECHNIQUE:  10.4 mCi of F18-FDG was administered intravenously.  After an approximately 60 min distribution time, PET/CT images were acquired from the skull base to the mid thigh. Transmission images were acquired to correct for attenuation using a whole body low-dose CT scan without contrast with the arms positioned above the head.    COMPARISON:  CT of the chest abdomen and pelvis 03/06/2020.    FINDINGS:  Head and neck: There is symmetric and physiologic distribution of radiotracer throughout the included brain.  There is no hemorrhage, hydrocephalus, or midline shift.  There is no FDG avid cervical lymphadenopathy.    Chest: There is no FDG avid mediastinal or hilar lymphadenopathy.  There is a pacing device in the left upper chest.  There is no FDG avid pulmonary nodule or mass.  There is no pleural effusion.    Abdomen and pelvis: There is symmetric and physiologic distribution of radiotracer throughout the liver, spleen, and collecting system.  There is a focal area of soft tissue thickening and associated hypermetabolic activity in the colon at the level of the a Paddock flexure.  This is best visualized on image 139 and demonstrates an SUV max of 15.0.  There is no FDG avid mesenteric, pelvic, or retroperitoneal adenopathy.    Musculoskeletal: There is a lytic lesion in the L4 vertebral body measuring 2.2 x 1.3 cm with an SUV max of 3.0.  This is best visualized on image 165.  Impression: 1. Hypermetabolic soft tissue thickening of the colon at the level of the hepatic flexure consistent  with a primary colonic malignancy.  2. 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    Electronically signed by: Walt Lara MD  Date:    07/05/2024  Time:    18:34        ASSESSMENT & PLAN:   ASSESSMENT:  Acute chest pain- POA  NSTEMI- suspected type II- POA  Elevated troponin- POA  Colon tumor s/p hemicolectomy- POA  Right renal mass- suspicion for malignancy- POA  Wealkness- POA    Hx of CAD, CHF, arthritis, HDL, HTN,  carotid artery stenosis, anemia,  anxiety, depression, CINDY, PVD, PAD      PLAN:  Consult Cardiology Services appreciate assistance and recommendations   Consult surgery Services appreciate assistance and recommendations   Continue with routine postoperative management maintain NG tube  Resume home medication as deemed necessary per NG tube  Fall precautions   Accurate I&O   PRN pain management   Trend out troponin level   Repeat lab work in a.m.      VTE Prophylaxis: Lovenox for DVT prophylaxis and will be advised to be as mobile as possible and sit in a chair as tolerated    Patient condition:  Stable  __________________________________________________________________________  INPATIENT LIST OF MEDICATIONS     Scheduled Meds:   enoxparin  40 mg Subcutaneous Daily    mupirocin   Nasal BID     Continuous Infusions:  PRN Meds:.  Current Facility-Administered Medications:     acetaminophen, 650 mg, Oral, Q6H PRN    aluminum-magnesium hydroxide-simethicone, 30 mL, Oral, QID PRN    cloNIDine, 0.2 mg, Oral, TID PRN    hydrALAZINE, 20 mg, Intravenous, Q4H PRN    labetalol, 10 mg, Intravenous, Q4H PRN    melatonin, 6 mg, Oral, Nightly PRN    naloxone, 0.02 mg, Intravenous, PRN    ondansetron, 4 mg, Intravenous, Q4H PRN    polyethylene glycol, 17 g, Oral, BID PRN    prochlorperazine, 5 mg, Intravenous, Q6H PRN    simethicone, 1 tablet, Oral, QID PRN      IClau FNP have reviewed and discussed the case with   Dr. Wilmer Ramirez.  Please see the following addendum for further assessment and plan  from their attending MD.  Clau Garcia, [facility]   08/01/2024    ________________________________________________________________________________    MD Addendum:  Dr. EDEN ---assumed care of this patient today at ---am/pm  For the patient encounter, I performed the substantive portion of the visit, I reviewed the NP/PA documentation, treatment plan, and medical decision making.  I had face to face time with this patient     A. History:    B. Physical exam:    C. Medical decision making:    Discharge Planning and Disposition: No mobility needs. Ambulating well. Good social support system.   Anticipated discharge    All diagnosis and differential diagnosis have been reviewed; assessment and plan has been documented; I have personally reviewed the labs and test results that are presently available; I have reviewed the patients medication list; I have reviewed the consulting providers response and recommendations. I have reviewed or attempted to review medical records based upon their availability.    All of the patient and family questions have been addressed and answered. Patient's is agreeable to the above stated plan. I will continue to monitor closely and make adjustments to medical management as needed.

## 2024-08-01 NOTE — CONSULTS
Inpatient consult to Cardiology  Consult performed by: Emani Espinosa NP  Consult ordered by: Shayy Driscoll AGACNP-BC  Reason for consult: NSTEMI                [facility] Medical Tele[facility]    Cardiology  Consult Note    Patient Name: Justyn Perez  MRN: [id number]  Admission Date: 8/1/2024  Hospital Length of Stay: 0 days  Code Status: Full Code   Attending Provider: Antonia Uriarte MD   Consulting Provider: Emani Espinosa NP  Primary Care Physician: Rodney Jalloh NP  Principal Problem:<principal problem not specified>    Patient information was obtained from patient, past medical records, and ER records.     Subjective:     Reason for Consult: NSTEMI    HPI: 78-natalie-old male that is known to [facility] /Dr. Mares with a PMHX of AS s/p TAVR, CKD stage II, CAD, HLD, HTN, PVD. He was hospitalized at [facility] after colon tumor removal NG tube still in place. Post operatively the patient began to have complaints of CP and the decision to transfer to [facility] was made. [facility] was transferred for NSTEMI.       PMH: AS s/p TAVR, CKD stage II, HTN, HLD, PVD, CAD, MODESTA, HHD  PSH: Valve replacement, Carotid Stent, colonoscopy, Fem-pop bypass, TAVR, Tonsillectomy   Family History: Father: CVA, Mother: Parkinson   Social History: former tobacco use, denies ETOH or illict drug use     Previous Cardiac Diagnostics:     ECHO (7.9.24):  The study quality is average.   The left ventricle is normal in size with asymmetric (moderate septal and mild posterior) left ventricular hypertrophy and normal left ventricular systolic function. The left ventricular ejection fraction is 50%. Left ventricular diastolic function is indeterminate.  The right ventricle is mildly enlarged at 3.4 cms with normal right ventricular systolic function. TAPSE = 2.6 cms.  The left atrial diameter is mildly increased at 4.4 cms. The left atrium is mildly enlarged based on the left atrium volume index of 36.6ml/m².  The  right atrium is normal in size with a linear echo density noted, suggestive of an ICD lead.   Patient HX TAVR from 4/29/2020 using a 26 mm S3 valve with a MG ~ 12.4 mmHg, PV ~ 2.8 m/sec and dimensionless index  ~ 0.38. No significant angie-prosthetic regurgitation was visualized.  Moderate calcification of the mitral valve is present with mild to moderate mitral stenosis. Mildly decreased leaflet mobility is noted. Moderate to severe mitral annular calcification is noted. The area by pressure half time is 2.5 cm². The mean trans mitral gradient is 7.8 mmHg.   Mild to moderate (1-2+) mitral and mild (1+) tricuspid regurgitation is present.  Mild pulmonary hypertension is present with an estimated pulmonary artery systolic pressure at 50 mmHg assuming a right atrial pressure of 3 mmHg.     Carotid US (7.9.24):  The study quality is average.   A Metalic stent in the proximal right common carotid artery extending to mid right common carotid artery. Noted mild 20% instent restenosis of proximal right common carotid artery.   1-39% stenosis in the mid right internal carotid artery based on Bluth Criteria.   1-39% stenosis in the proximal left internal carotid artery based on Bluth Criteria.   Antegrade right vertebral artery flow.   Antegrade left vertebral artery flow.     PET (12.29.23):  This is an abnormal perfusion study.   This scan is suggestive of moderate risk for future cardiovascular events.   Small fixed perfusion abnormality of moderate intensity in the apical segment. Large partially reversible perfusion abnormality of mild intensity in the inferior region.   The left ventricular cavity is noted to be normal on the stress studies. The stress left ventricular ejection fraction was calculated to be 60% and left ventricular global function is normal. The rest left ventricular cavity is noted to be normal. The rest left ventricular ejection fraction was calculated to be 54% and rest left ventricular global  function is normal.   When compared to the resting ejection fraction (54%), the stress ejection fraction (60%) has increased.   The study quality is average.   There was a rise in myocardial blood flow between rest and stress.  Global myocardial blood flow reserve was 1.35.  Myocardial blood flow reserve is globally abnormal, placing the patient at a higher coronary event risk.    ECHO (6.6.23):  The study quality is good.   The left ventricle is normal in size Global left ventricular systolic function is normal. The left ventricular ejection fraction is 60%. The left ventricle diastolic function is impaired (Grade I) with normal left atrial pressure. Noted left ventricular hypertrophy. Concentric left ventricular hypertrophy is present. It is mild.  Mitral stenosis is mild to moderate . The area by planimetry is 2.3 cm². Banks score ~ 10. The mean trans mitral gradient is 5.9 mmHg.  Patient hx of TAVR 4/2020 with 26 mm Prasanth S3. Trace perivalvular leak noted. Dimensionles index is 0.60. The trans-aortic peak velocity is 2.36 m/s. The trans-aortic mean gradient is 9.3 mmHg. LVOT Diameter is 1.9 cms.  Mild to moderate (1-2+) mitral regurgitation. Mild (1+) tricuspid regurgitation. Trace pulmonic regurgitation.   The pulmonary artery systolic pressure is 37 mmHg.   A linear echo density is noted in the right ventricle, suggestive of a ICD lead.   A small pericardial effusion is noted. It is adjacent to the right atrium. The pericardial effusion thickness is 0.91 cm.    LHC (1.15.20):  LM: Patent  LAS: moderate luminal irregularities  LCx: mild irregularities   RCA 50% ostial stenosis     Review of patient's allergies indicates:   Allergen Reactions    Penicillins Rash     No current facility-administered medications on file prior to encounter.     Current Outpatient Medications on File Prior to Encounter   Medication Sig    amLODIPine (NORVASC) 10 MG tablet Take 10 mg by mouth every evening.    aspirin 81 MG Chew  Take 81 mg by mouth once daily.    atorvastatin (LIPITOR) 80 MG tablet Take 80 mg by mouth every evening.    carvediloL (COREG) 25 MG tablet Take 25 mg by mouth once daily at 6am.    cloNIDine (CATAPRES) 0.1 MG tablet Take 0.1 mg by mouth 2 (two) times daily. For blood pressure >170    clopidogreL (PLAVIX) 75 mg tablet Take 75 mg by mouth once daily at 6am.    glucosamine/chondr sy A sod (OSTEO BI-FLEX ORAL) Take 1 tablet by mouth once daily at 6am.    hydrALAZINE (APRESOLINE) 100 MG tablet Take 100 mg by mouth every 8 (eight) hours.    olmesartan (BENICAR) 40 MG tablet Take 40 mg by mouth every evening.    sertraline (ZOLOFT) 50 MG tablet Take 1 tablet (50 mg total) by mouth once daily.    traZODone (DESYREL) 100 MG tablet Take 1 tablet (100 mg total) by mouth every evening.    ascorbic acid, vitamin C, (VITAMIN C) 250 MG tablet Take 250 mg by mouth once daily.    ezetimibe (ZETIA) 10 mg tablet Take 10 mg by mouth every evening.    multivitamin with folic acid 400 mcg Tab Take 1 tablet by mouth once daily.       Review of Systems   Constitutional:  Negative for chills and fatigue.   Respiratory:  Negative for chest tightness and shortness of breath.    Cardiovascular:  Negative for chest pain and palpitations.   Gastrointestinal:  Positive for abdominal pain and nausea.   Skin: Negative.    Psychiatric/Behavioral: Negative.         Objective:     Vital Signs (Most Recent):  Temp: 98.2 °F (36.8 °C) (08/01/24 0716)  Pulse: 81 (08/01/24 0716)  Resp: 20 (08/01/24 0716)  BP: (!) 183/71 (08/01/24 0716)  SpO2: 95 % (08/01/24 0716) Vital Signs (24h Range):  Temp:  [98.2 °F (36.8 °C)-98.6 °F (37 °C)] 98.2 °F (36.8 °C)  Pulse:  [81] 81  Resp:  [16-20] 20  SpO2:  [95 %-96 %] 95 %  BP: (143-183)/(71-84) 183/71   Weight: 70.3 kg (155 lb)  Body mass index is 25.02 kg/m².  SpO2: 95 %       Intake/Output Summary (Last 24 hours) at 8/1/2024 1005  Last data filed at 8/1/2024 0957  Gross per 24 hour   Intake --   Output 700 ml   Net  "-700 ml     Lines/Drains/Airways       Drain  Duration                  Urethral Catheter 07/30/24 1000 2 days              Peripheral Intravenous Line  Duration                  Peripheral IV - Single Lumen 08/01/24 0431 20 G Anterior;Distal;Right Upper Arm <1 day         Peripheral IV - Single Lumen 08/01/24 0432 20 G Anterior;Distal;Left Upper Arm <1 day                  Significant Labs:   Chemistries:   Recent Labs   Lab 08/01/24  0616      K 4.1   *   CO2 22*   BUN 29.2*   CREATININE 1.32*   CALCIUM 8.4*   BILITOT 0.6   ALKPHOS 70   ALT 20   AST 25   GLUCOSE 105   MG 2.00   PHOS 2.7   TROPONINI 0.686*        CBC/Anemia Labs: Coags:    Recent Labs   Lab 08/01/24  0616   WBC 16.41*   HGB 8.9*   HCT 28.0*      MCV 89.2   RDW 17.1*    No results for input(s): "PT", "INR", "APTT" in the last 168 hours.     Significant Imaging:    EKG:       Telemetry:  NSR    Physical Exam  HENT:      Head: Normocephalic.      Mouth/Throat:      Mouth: Mucous membranes are moist.   Cardiovascular:      Rate and Rhythm: Normal rate and regular rhythm.      Pulses: Normal pulses.      Heart sounds: No murmur heard.  Abdominal:      Palpations: Abdomen is soft.      Tenderness: There is abdominal tenderness.   Skin:     General: Skin is warm.   Neurological:      Mental Status: He is alert and oriented to person, place, and time.   Psychiatric:         Mood and Affect: Mood normal.         Behavior: Behavior normal.         Judgment: Judgment normal.       Home Medications:   No current facility-administered medications on file prior to encounter.     Current Outpatient Medications on File Prior to Encounter   Medication Sig Dispense Refill    amLODIPine (NORVASC) 10 MG tablet Take 10 mg by mouth every evening.      aspirin 81 MG Chew Take 81 mg by mouth once daily.      atorvastatin (LIPITOR) 80 MG tablet Take 80 mg by mouth every evening.      carvediloL (COREG) 25 MG tablet Take 25 mg by mouth once daily at 6am.  "     cloNIDine (CATAPRES) 0.1 MG tablet Take 0.1 mg by mouth 2 (two) times daily. For blood pressure >170      clopidogreL (PLAVIX) 75 mg tablet Take 75 mg by mouth once daily at 6am.      glucosamine/chondr sy A sod (OSTEO BI-FLEX ORAL) Take 1 tablet by mouth once daily at 6am.      hydrALAZINE (APRESOLINE) 100 MG tablet Take 100 mg by mouth every 8 (eight) hours.      olmesartan (BENICAR) 40 MG tablet Take 40 mg by mouth every evening.      sertraline (ZOLOFT) 50 MG tablet Take 1 tablet (50 mg total) by mouth once daily. 60 tablet 2    traZODone (DESYREL) 100 MG tablet Take 1 tablet (100 mg total) by mouth every evening. 30 tablet 3    ascorbic acid, vitamin C, (VITAMIN C) 250 MG tablet Take 250 mg by mouth once daily.      ezetimibe (ZETIA) 10 mg tablet Take 10 mg by mouth every evening.      multivitamin with folic acid 400 mcg Tab Take 1 tablet by mouth once daily.       Current Schedule Inpatient Medications:   enoxparin  40 mg Subcutaneous Daily    mupirocin   Nasal BID     Continuous Infusions:    Assessment:   NSTEMI Type II s/t recent surgical interventiom    - Trop: 0.688 --> 0.700   - no specific ST changes    - ECHO (7.9.24): LVEF 50%  CAD   - PET (12.29.23):This is an abnormal perfusion study. Small fixed perfusion abnormality of moderate intensity in the apical segment. Large partially reversible perfusion abnormality of mild intensity in the inferior region.   Colon tumor   Leukocytosis   - WBC 16.41  Hx of Anemia    - being worked up by PCP  AS s/p TAVR  CKD Stage II  HTN  HLD  PVD  BCAS   - R carotid stenting & L CEA  MODESTA   - history of stenting, underwent balloon angioplasty for ISR 11/23    Plan:   EKG reviewed   Cont. To trend trop  Cont. Lovenox   Due to recent surgical intervention and unknown results of outpatient EGD unable to proceed with [facility] at this time ~ patient has been chest pain free since he arrived at [facility]  No need for acute ischemic evaluation   AM labs: CBC, CMP, Mag    Thank you  for your consult.     Emani Espinosa NP  Cardiology  [facility] Tele[facility]  08/01/2024    Physician addendum:  I have seen and examined this patient as a split-shared visit with the NIKI d/t complicated medical management of above problems written in assessment and high acuity requiring physician expertise in medical decision-making. I performed the substantive portion of the history and exam. Above medical decision-making is also formulated by me.    Cardiovascular exam:  S1, S2  Lungs:  fine crackles at bases.  Extremities:  1+ edema bilaterally    Plan:  Medications as above.  Continue supportive therapy.  We will follow up.      Bill Yang MD  Cardiologist

## 2024-08-01 NOTE — NURSING
Nurses Note -- 4 Eyes      8/1/2024   5:31 AM      Skin assessed during: Admit      [x] No Altered Skin Integrity Present    []Prevention Measures Documented      [] Yes- Altered Skin Integrity Present or Discovered   [] LDA Added if Not in Epic (Describe Wound)   [] New Altered Skin Integrity was Present on Admit and Documented in LDA   [] Wound Image Taken    Wound Care Consulted? No    Attending Nurse:  Mei Valdivia RN/Staff Member:   Ventura العراقي

## 2024-08-01 NOTE — NURSING
Nurses Note -- 4 Eyes      8/1/2024   12:36 PM      Skin assessed during: Admit      [x] No Altered Skin Integrity Present    []Prevention Measures Documented      [] Yes- Altered Skin Integrity Present or Discovered   [] LDA Added if Not in Epic (Describe Wound)   [] New Altered Skin Integrity was Present on Admit and Documented in LDA   [] Wound Image Taken    Wound Care Consulted? No    Attending Nurse:  Marla Valdivia RN/Staff Member:  Tiffanie Jeter

## 2024-08-02 VITALS
HEIGHT: 66 IN | RESPIRATION RATE: 18 BRPM | HEART RATE: 91 BPM | WEIGHT: 155 LBS | SYSTOLIC BLOOD PRESSURE: 175 MMHG | DIASTOLIC BLOOD PRESSURE: 74 MMHG | OXYGEN SATURATION: 94 % | TEMPERATURE: 98 F | BODY MASS INDEX: 24.91 KG/M2

## 2024-08-02 LAB
ALBUMIN SERPL-MCNC: 2.7 G/DL (ref 3.4–4.8)
ALBUMIN/GLOB SERPL: 1 RATIO (ref 1.1–2)
ALP SERPL-CCNC: 73 UNIT/L (ref 40–150)
ALT SERPL-CCNC: 21 UNIT/L (ref 0–55)
ANION GAP SERPL CALC-SCNC: 10 MEQ/L
AST SERPL-CCNC: 24 UNIT/L (ref 5–34)
BASOPHILS # BLD AUTO: 0.03 X10(3)/MCL
BASOPHILS NFR BLD AUTO: 0.2 %
BILIRUB SERPL-MCNC: 0.9 MG/DL
BUN SERPL-MCNC: 34.1 MG/DL (ref 8.4–25.7)
CALCIUM SERPL-MCNC: 8.8 MG/DL (ref 8.8–10)
CHLORIDE SERPL-SCNC: 109 MMOL/L (ref 98–107)
CO2 SERPL-SCNC: 21 MMOL/L (ref 23–31)
CREAT SERPL-MCNC: 1.27 MG/DL (ref 0.73–1.18)
CREAT/UREA NIT SERPL: 27
EOSINOPHIL # BLD AUTO: 0.04 X10(3)/MCL (ref 0–0.9)
EOSINOPHIL NFR BLD AUTO: 0.3 %
ERYTHROCYTE [DISTWIDTH] IN BLOOD BY AUTOMATED COUNT: 17.2 % (ref 11.5–17)
GFR SERPLBLD CREATININE-BSD FMLA CKD-EPI: 57 ML/MIN/1.73/M2
GLOBULIN SER-MCNC: 2.6 GM/DL (ref 2.4–3.5)
GLUCOSE SERPL-MCNC: 124 MG/DL (ref 82–115)
HCT VFR BLD AUTO: 31.7 % (ref 42–52)
HGB BLD-MCNC: 10.2 G/DL (ref 14–18)
IMM GRANULOCYTES # BLD AUTO: 0.1 X10(3)/MCL (ref 0–0.04)
IMM GRANULOCYTES NFR BLD AUTO: 0.6 %
LYMPHOCYTES # BLD AUTO: 0.56 X10(3)/MCL (ref 0.6–4.6)
LYMPHOCYTES NFR BLD AUTO: 3.5 %
MCH RBC QN AUTO: 28.5 PG (ref 27–31)
MCHC RBC AUTO-ENTMCNC: 32.2 G/DL (ref 33–36)
MCV RBC AUTO: 88.5 FL (ref 80–94)
MONOCYTES # BLD AUTO: 0.85 X10(3)/MCL (ref 0.1–1.3)
MONOCYTES NFR BLD AUTO: 5.3 %
NEUTROPHILS # BLD AUTO: 14.36 X10(3)/MCL (ref 2.1–9.2)
NEUTROPHILS NFR BLD AUTO: 90.1 %
NRBC BLD AUTO-RTO: 0 %
PLATELET # BLD AUTO: 223 X10(3)/MCL (ref 130–400)
PLATELETS.RETICULATED NFR BLD AUTO: 3.4 % (ref 0.9–11.2)
PMV BLD AUTO: 10.8 FL (ref 7.4–10.4)
POTASSIUM SERPL-SCNC: 3.8 MMOL/L (ref 3.5–5.1)
PROT SERPL-MCNC: 5.3 GM/DL (ref 5.8–7.6)
RBC # BLD AUTO: 3.58 X10(6)/MCL (ref 4.7–6.1)
SODIUM SERPL-SCNC: 140 MMOL/L (ref 136–145)
WBC # BLD AUTO: 15.94 X10(3)/MCL (ref 4.5–11.5)

## 2024-08-02 PROCEDURE — 99233 SBSQ HOSP IP/OBS HIGH 50: CPT | Mod: ,,, | Performed by: SURGERY

## 2024-08-02 PROCEDURE — 85025 COMPLETE CBC W/AUTO DIFF WBC: CPT | Performed by: NURSE PRACTITIONER

## 2024-08-02 PROCEDURE — 63600175 PHARM REV CODE 636 W HCPCS: Performed by: INTERNAL MEDICINE

## 2024-08-02 PROCEDURE — 63600175 PHARM REV CODE 636 W HCPCS: Mod: JZ,JG | Performed by: INTERNAL MEDICINE

## 2024-08-02 PROCEDURE — 25000003 PHARM REV CODE 250: Performed by: INTERNAL MEDICINE

## 2024-08-02 PROCEDURE — 80053 COMPREHEN METABOLIC PANEL: CPT | Performed by: NURSE PRACTITIONER

## 2024-08-02 PROCEDURE — 36415 COLL VENOUS BLD VENIPUNCTURE: CPT | Performed by: NURSE PRACTITIONER

## 2024-08-02 PROCEDURE — 97162 PT EVAL MOD COMPLEX 30 MIN: CPT

## 2024-08-02 RX ORDER — METRONIDAZOLE 500 MG/1
500 TABLET ORAL EVERY 8 HOURS
Qty: 30 TABLET | Refills: 0 | Status: SHIPPED | OUTPATIENT
Start: 2024-08-02 | End: 2024-08-12

## 2024-08-02 RX ORDER — HYDRALAZINE HYDROCHLORIDE 100 MG/1
100 TABLET, FILM COATED ORAL EVERY 8 HOURS
Qty: 90 TABLET | Refills: 11 | Status: SHIPPED | OUTPATIENT
Start: 2024-08-02 | End: 2025-08-02

## 2024-08-02 RX ORDER — CARVEDILOL 25 MG/1
25 TABLET ORAL 2 TIMES DAILY WITH MEALS
Qty: 180 TABLET | Refills: 3 | Status: SHIPPED | OUTPATIENT
Start: 2024-08-02 | End: 2025-08-02

## 2024-08-02 RX ORDER — CLONIDINE HYDROCHLORIDE 0.1 MG/1
0.1 TABLET ORAL 2 TIMES DAILY
Qty: 60 TABLET | Refills: 11 | Status: SHIPPED | OUTPATIENT
Start: 2024-08-02 | End: 2025-08-02

## 2024-08-02 RX ORDER — LABETALOL HYDROCHLORIDE 5 MG/ML
20 INJECTION, SOLUTION INTRAVENOUS EVERY 4 HOURS PRN
Status: DISCONTINUED | OUTPATIENT
Start: 2024-08-02 | End: 2024-08-02 | Stop reason: HOSPADM

## 2024-08-02 RX ORDER — HYDRALAZINE HYDROCHLORIDE 50 MG/1
100 TABLET, FILM COATED ORAL EVERY 8 HOURS
Status: DISCONTINUED | OUTPATIENT
Start: 2024-08-02 | End: 2024-08-02 | Stop reason: HOSPADM

## 2024-08-02 RX ORDER — CIPROFLOXACIN 500 MG/1
500 TABLET ORAL 2 TIMES DAILY
Qty: 20 TABLET | Refills: 0 | Status: SHIPPED | OUTPATIENT
Start: 2024-08-02 | End: 2024-08-12

## 2024-08-02 RX ADMIN — HYDRALAZINE HYDROCHLORIDE 20 MG: 20 INJECTION INTRAMUSCULAR; INTRAVENOUS at 03:08

## 2024-08-02 RX ADMIN — SERTRALINE HYDROCHLORIDE 50 MG: 50 TABLET ORAL at 08:08

## 2024-08-02 RX ADMIN — CLONIDINE HYDROCHLORIDE 0.1 MG: 0.1 TABLET ORAL at 08:08

## 2024-08-02 RX ADMIN — METRONIDAZOLE 500 MG: 500 INJECTION, SOLUTION INTRAVENOUS at 03:08

## 2024-08-02 RX ADMIN — CLOPIDOGREL BISULFATE 75 MG: 75 TABLET ORAL at 08:08

## 2024-08-02 RX ADMIN — MUPIROCIN: 20 OINTMENT TOPICAL at 08:08

## 2024-08-02 RX ADMIN — METRONIDAZOLE 500 MG: 500 INJECTION, SOLUTION INTRAVENOUS at 11:08

## 2024-08-02 RX ADMIN — ASPIRIN 81 MG CHEWABLE TABLET 81 MG: 81 TABLET CHEWABLE at 08:08

## 2024-08-02 RX ADMIN — HYDRALAZINE HYDROCHLORIDE 100 MG: 50 TABLET ORAL at 08:08

## 2024-08-02 RX ADMIN — HYDRALAZINE HYDROCHLORIDE 20 MG: 20 INJECTION INTRAMUSCULAR; INTRAVENOUS at 12:08

## 2024-08-02 RX ADMIN — CARVEDILOL 25 MG: 12.5 TABLET, FILM COATED ORAL at 08:08

## 2024-08-02 RX ADMIN — CIPROFLOXACIN 400 MG: 200 INJECTION, SOLUTION INTRAVENOUS at 08:08

## 2024-08-02 NOTE — NURSING
Pt transferred to [facility] via [facility]. Report delivered to ICU nurse at 1300. Pt stable upon handoff to [facility] at 1355

## 2024-08-02 NOTE — PROGRESS NOTES
[facility] - 6th Floor Medical Telemetry    Cardiology  Progress Note    Patient Name: Justyn Perez  MRN: [id number]  Admission Date: 8/1/2024  Hospital Length of Stay: 1 days  Code Status: Full Code   Attending Physician: Wally Galan MD   Primary Care Physician: Rodney Jalloh NP  Expected Discharge Date: 8/2/2024  Principal Problem:Chest pain    Subjective:     Reason for Consult: NSTEMI     HPI: 78-natal[age]-old male that is known to [facility] /Dr. Mares with a PMHX of AS s/p TAVR, CKD stage II, CAD, HLD, HTN, PVD. He was hospitalized at [facility] after colon tumor removal NG tube still in place. Post operatively the patient began to have complaints of CP and the decision to transfer to [facility] was made. [facility] was transferred for NSTEMI.     Hospital Course:   8.2.24: NAD, VSS. He denies SOB, CP, or nausea. He reports he wishes to got back to [facility] to be at the facility that his surgeon was at    PMH: AS s/p TAVR, CKD stage II, HTN, HLD, PVD, CAD, MODESTA, HHD  PSH: Valve replacement, Carotid Stent, colonoscopy, Fem-pop bypass, TAVR, Tonsillectomy   Family History: Father: CVA, Mother: Parkinson   Social History: former tobacco use, denies ETOH or illict drug use      Previous Cardiac Diagnostics:      ECHO (7.9.24):  The study quality is average.   The left ventricle is normal in size with asymmetric (moderate septal and mild posterior) left ventricular hypertrophy and normal left ventricular systolic function. The left ventricular ejection fraction is 50%. Left ventricular diastolic function is indeterminate.  The right ventricle is mildly enlarged at 3.4 cms with normal right ventricular systolic function. TAPSE = 2.6 cms.  The left atrial diameter is mildly increased at 4.4 cms. The left atrium is mildly enlarged based on the left atrium volume index of 36.6ml/m².  The right atrium is normal in size with a linear echo density noted, suggestive of an ICD lead.   Patient HX  TAVR from 4/29/2020 using a 26 mm S3 valve with a MG ~ 12.4 mmHg, PV ~ 2.8 m/sec and dimensionless index  ~ 0.38. No significant angie-prosthetic regurgitation was visualized.  Moderate calcification of the mitral valve is present with mild to moderate mitral stenosis. Mildly decreased leaflet mobility is noted. Moderate to severe mitral annular calcification is noted. The area by pressure half time is 2.5 cm². The mean trans mitral gradient is 7.8 mmHg.   Mild to moderate (1-2+) mitral and mild (1+) tricuspid regurgitation is present.  Mild pulmonary hypertension is present with an estimated pulmonary artery systolic pressure at 50 mmHg assuming a right atrial pressure of 3 mmHg.      Carotid US (7.9.24):  The study quality is average.   A Metalic stent in the proximal right common carotid artery extending to mid right common carotid artery. Noted mild 20% instent restenosis of proximal right common carotid artery.   1-39% stenosis in the mid right internal carotid artery based on Bluth Criteria.   1-39% stenosis in the proximal left internal carotid artery based on Bluth Criteria.   Antegrade right vertebral artery flow.   Antegrade left vertebral artery flow.      PET (12.29.23):  This is an abnormal perfusion study.   This scan is suggestive of moderate risk for future cardiovascular events.   Small fixed perfusion abnormality of moderate intensity in the apical segment. Large partially reversible perfusion abnormality of mild intensity in the inferior region.   The left ventricular cavity is noted to be normal on the stress studies. The stress left ventricular ejection fraction was calculated to be 60% and left ventricular global function is normal. The rest left ventricular cavity is noted to be normal. The rest left ventricular ejection fraction was calculated to be 54% and rest left ventricular global function is normal.   When compared to the resting ejection fraction (54%), the stress ejection fraction  (60%) has increased.   The study quality is average.   There was a rise in myocardial blood flow between rest and stress.  Global myocardial blood flow reserve was 1.35.  Myocardial blood flow reserve is globally abnormal, placing the patient at a higher coronary event risk.     ECHO (6.6.23):  The study quality is good.   The left ventricle is normal in size Global left ventricular systolic function is normal. The left ventricular ejection fraction is 60%. The left ventricle diastolic function is impaired (Grade I) with normal left atrial pressure. Noted left ventricular hypertrophy. Concentric left ventricular hypertrophy is present. It is mild.  Mitral stenosis is mild to moderate . The area by planimetry is 2.3 cm². Banks score ~ 10. The mean trans mitral gradient is 5.9 mmHg.  Patient hx of TAVR 4/2020 with 26 mm Prasanth S3. Trace perivalvular leak noted. Dimensionles index is 0.60. The trans-aortic peak velocity is 2.36 m/s. The trans-aortic mean gradient is 9.3 mmHg. LVOT Diameter is 1.9 cms.  Mild to moderate (1-2+) mitral regurgitation. Mild (1+) tricuspid regurgitation. Trace pulmonic regurgitation.   The pulmonary artery systolic pressure is 37 mmHg.   A linear echo density is noted in the right ventricle, suggestive of a ICD lead.   A small pericardial effusion is noted. It is adjacent to the right atrium. The pericardial effusion thickness is 0.91 cm.     LHC (1.15.20):  LM: Patent  LAS: moderate luminal irregularities  LCx: mild irregularities   RCA 50% ostial stenosis     Review of Systems   Constitutional: Negative for chills and fever.   Cardiovascular:  Negative for chest pain and syncope.   Musculoskeletal: Negative.    Gastrointestinal:  Negative for abdominal pain and nausea.   Psychiatric/Behavioral: Negative.       Objective:     Vital Signs (Most Recent):  Temp: 97.6 °F (36.4 °C) (08/02/24 1126)  Pulse: 91 (08/02/24 1200)  Resp: 18 (08/01/24 1515)  BP: (!) 175/74 (08/02/24 1126)  SpO2: (!)  "94 % (08/02/24 1200) Vital Signs (24h Range):  Temp:  [97.5 °F (36.4 °C)-98.7 °F (37.1 °C)] 97.6 °F (36.4 °C)  Pulse:  [82-99] 91  SpO2:  [92 %-96 %] 94 %  BP: (127-186)/(61-78) 175/74   Weight: 70.3 kg (155 lb)  Body mass index is 25.02 kg/m².  SpO2: (!) 94 %       Intake/Output Summary (Last 24 hours) at 8/2/2024 1649  Last data filed at 8/2/2024 1330  Gross per 24 hour   Intake 260 ml   Output 1605 ml   Net -1345 ml     Lines/Drains/Airways       Drain  Duration                  NG/OG Tube 07/30/24 1200 Right nostril 3 days         Urethral Catheter 07/30/24 1000 3 days              Peripheral Intravenous Line  Duration                  Peripheral IV - Single Lumen 08/01/24 0432 20 G Anterior;Distal;Left Upper Arm 1 day                    Significant Labs:   Chemistries:   Recent Labs   Lab 08/01/24  0616 08/01/24  1159 08/01/24  1827 08/02/24  0507     --   --  140   K 4.1  --   --  3.8   *  --   --  109*   CO2 22*  --   --  21*   BUN 29.2*  --   --  34.1*   CREATININE 1.32*  --   --  1.27*   CALCIUM 8.4*  --   --  8.8   BILITOT 0.6  --   --  0.9   ALKPHOS 70  --   --  73   ALT 20  --   --  21   AST 25  --   --  24   GLUCOSE 105  --   --  124*   MG 2.00  --   --   --    PHOS 2.7  --   --   --    TROPONINI 0.686* 0.700* 0.704*  --         CBC/Anemia Labs: Coags:    Recent Labs   Lab 08/01/24  0616 08/02/24  0507   WBC 16.41* 15.94*   HGB 8.9* 10.2*   HCT 28.0* 31.7*    223   MCV 89.2 88.5   RDW 17.1* 17.2*    No results for input(s): "PT", "INR", "APTT" in the last 168 hours.     Telemetry:  NSR    Physical Exam  HENT:      Mouth/Throat:      Mouth: Mucous membranes are moist.   Eyes:      Pupils: Pupils are equal, round, and reactive to light.   Cardiovascular:      Rate and Rhythm: Normal rate and regular rhythm.      Pulses: Normal pulses.   Pulmonary:      Effort: Pulmonary effort is normal.      Breath sounds: Normal breath sounds.   Abdominal:      General: Abdomen is flat.   Skin:     " General: Skin is warm.   Neurological:      Mental Status: He is alert.   Psychiatric:         Mood and Affect: Mood normal.         Behavior: Behavior normal.         Judgment: Judgment normal.         Current Schedule Inpatient Medications:   amLODIPine  10 mg Oral QHS    aspirin  81 mg Oral Daily    atorvastatin  80 mg Oral QHS    carvediloL  25 mg Oral BID WM    ciprofloxacin  400 mg Intravenous Q12H    cloNIDine  0.1 mg Oral BID    clopidogreL  75 mg Oral Daily    enoxparin  40 mg Subcutaneous Daily    hydrALAZINE  100 mg Oral Q8H    metronidazole  500 mg Intravenous Q8H    mupirocin   Nasal BID    sertraline  50 mg Oral Daily    traZODone  100 mg Per NG tube QHS     Continuous Infusions:   Amino acid 4.25% - dextrose 5% (CLINIMIX-E) solution (1L provides 42.5 gm AA, 50 gm CHO (170 kcal/L dextrose), Na 35, K 30, Mg 5, Ca 4.5, Acetate 70, Cl 39, Phos 15)   Intravenous Continuous           Assessment:   NSTEMI Type II s/t recent surgical interventiom    - Trop: 0.688 --> 0.700   - no specific ST changes    - ECHO (7.9.24): LVEF 50%  CAD   - PET (12.29.23):This is an abnormal perfusion study. Small fixed perfusion abnormality of moderate intensity in the apical segment. Large partially reversible perfusion abnormality of mild intensity in the inferior region.   Colon tumor   Leukocytosis   - WBC 16.41  Hx of Anemia    - being worked up by PCP  AS s/p TAVR  CKD Stage II  HTN  HLD  PVD  BCAS   - R carotid stenting & L CEA  MODESTA   - history of stenting, underwent balloon angioplasty for ISR 11/23      Plan:   EKG reviewed   Cont. To trend trop  Cont. Lovenox   Due to recent surgical intervention and unknown results of outpatient EGD unable to proceed with [facility] at this time ~ patient has been chest pain free since he arrived at [facility]  No need for acute ischemic evaluation   Cardioloy to sign off please reconsult if needed           Emani Espinosa NP  Cardiology  [facility] - 6th Floor Medical Telemetry

## 2024-08-02 NOTE — NURSING
General surgery MD Katherin Harrison rounded on patient at approximately 2015, stated to keep NG tube to low continuous suction, also approved giving meds via NG tube, clamp and turn off suction for 30 minutes after administering medication and if patient starts experiencing N/V within those 30 minutes unclamp and turn suction back on

## 2024-08-02 NOTE — CONSULTS
[facility] General Surgery Service  ADMIT / CONSULT / H&P NOTE  Date: 2024    CC:   S/p left hemicolectomy and cholecystectomy at [facility]     SUBJECTIVE    HPI:   Justyn Perez is a 78 y.o. male s/p right hemicolectomy and cholecystectomy  for mass found on colonoscopy. Post operatively patient began having chest pain and was found to have NSTEMI. General surgery consulted to manage NG tube and post operative care. Patient reports minimal abdominal pain just some soreness around the incision. He has not passed gas since the surgery. He does report belching. Denies nausea or vomiting. He is anxious because he has not taken his anxiety medicine since surgery due to his NPO status.     ROS:  Negative except for HPI    PMH:   Past Medical History:   Diagnosis Date    Aortic stenosis     Carpal tunnel syndrome     CKD (chronic kidney disease)     Coronary artery disease     Disorder of kidney and ureter     Dyslipidemia     Hyperlipidemia     Hypertension     IGT (impaired glucose tolerance)     PVD (peripheral vascular disease)        PSH:   Past Surgical History:   Procedure Laterality Date    CARDIAC VALVE REPLACEMENT      CAROTID STENT      COLONOSCOPY N/A 2024    Procedure: COLONOSCOPY;  Surgeon: Jose C Bassett MD;  Location: [facility] ENDO;  Service: Gastroenterology;  Laterality: N/A;    femoral-popliteal artery bypass      Renal artery angiogram      TAVR-TF  2020    TONSILLECTOMY         FamHx:   Family History   Problem Relation Name Age of Onset    Parkinsonism Mother      Stroke Father      No Known Problems Sister      No Known Problems Brother      No Known Problems Daughter      No Known Problems Son         SocHx:  Social History     Socioeconomic History    Marital status:    Tobacco Use    Smoking status: Former     Types: Cigarettes     Start date:      Quit date:      Years since quittin.6    Smokeless tobacco: Never   Substance and Sexual Activity    Alcohol  use: Not Currently    Drug use: Never    Sexual activity: Not Currently     Social Determinants of Health     Financial Resource Strain: Low Risk  (6/30/2022)    Overall Financial Resource Strain (CARDIA)     Difficulty of Paying Living Expenses: Not hard at all   Food Insecurity: No Food Insecurity (6/30/2022)    Hunger Vital Sign     Worried About Running Out of Food in the Last Year: Never true     Ran Out of Food in the Last Year: Never true   Transportation Needs: No Transportation Needs (6/30/2022)    PRAPARE - Transportation     Lack of Transportation (Medical): No     Lack of Transportation (Non-Medical): No   Physical Activity: Sufficiently Active (6/30/2022)    Exercise Vital Sign     Days of Exercise per Week: 3 days     Minutes of Exercise per Session: 60 min   Stress: No Stress Concern Present (6/30/2022)    [employer] - Occupational Stress Questionnaire     Feeling of Stress : Not at all   Housing Stability: Low Risk  (6/30/2022)    Housing Stability Vital Sign     Unable to Pay for Housing in the Last Year: No     Number of Places Lived in the Last Year: 1     Unstable Housing in the Last Year: No       Allergies:   Review of patient's allergies indicates:   Allergen Reactions    Penicillins Rash       Medications:  No current facility-administered medications on file prior to encounter.     Current Outpatient Medications on File Prior to Encounter   Medication Sig Dispense Refill    amLODIPine (NORVASC) 10 MG tablet Take 10 mg by mouth every evening.      aspirin 81 MG Chew Take 81 mg by mouth once daily.      atorvastatin (LIPITOR) 80 MG tablet Take 80 mg by mouth every evening.      carvediloL (COREG) 25 MG tablet Take 25 mg by mouth once daily at 6am.      cloNIDine (CATAPRES) 0.1 MG tablet Take 0.1 mg by mouth 2 (two) times daily. For blood pressure >170      clopidogreL (PLAVIX) 75 mg tablet Take 75 mg by mouth once daily at 6am.      ezetimibe (ZETIA) 10 mg tablet  "Take 10 mg by mouth every evening.      glucosamine/chondr sy A sod (OSTEO BI-FLEX ORAL) Take 1 tablet by mouth once daily at 6am.      hydrALAZINE (APRESOLINE) 100 MG tablet Take 100 mg by mouth 2 (two) times a day.      olmesartan (BENICAR) 40 MG tablet Take 40 mg by mouth every evening.      sertraline (ZOLOFT) 50 MG tablet Take 1 tablet (50 mg total) by mouth once daily. 60 tablet 2    traZODone (DESYREL) 100 MG tablet Take 1 tablet (100 mg total) by mouth every evening. 30 tablet 3    ascorbic acid, vitamin C, (VITAMIN C) 250 MG tablet Take 250 mg by mouth once daily.      multivitamin with folic acid 400 mcg Tab Take 1 tablet by mouth once daily.         OBJECTIVE    VITAL SIGNS: 24 HR MIN & MAX LAST    Temp  Min: 97.9 °F (36.6 °C)  Max: 98.7 °F (37.1 °C)  98.7 °F (37.1 °C)        BP  Min: 127/64  Max: 185/66  127/64     Pulse  Min: 70  Max: 92  91     Resp  Min: 16  Max: 23  18    SpO2  Min: 95 %  Max: 98 %  96 %      HT: 5' 6" (167.6 cm)  WT: 70.3 kg (155 lb)  BMI: 25       Physical Exam:  General: NAD, anxious   HEENT: Anicteric sclera  Resp: Unlabored respirations  Cardiac: RRR  Abdomen: Soft, non distended, appropriately tender, incision clean, dry, intact   Extremities: Well perfused    Results  Recent Labs   Lab 08/01/24  0616   WBC 16.41*   HGB 8.9*   HCT 28.0*         CO2 22*   BUN 29.2*   CREATININE 1.32*   GLUCOSE 105   CALCIUM 8.4*   MG 2.00   PHOS 2.7   ALKPHOS 70       Imaging:  No new imaging since 6/2024    A/P:   78 y.o. male s/p right hemicolectomy and cholecystectomy 7/30 for mass found on colonoscopy. Transferred for cardiology services. General surgery consulted to manage NG tube and post operative care.    - NG tube to low intermittent wall suction. Await return of bowel function.   - mIVF   - NPO  - Okay for meds per NG tube   - Okay to leave incision without dressing   - PRN pain and nausea   - Rest of care per primary     Katherin Harrison MD  [employer] General Surgery PGY-2    "

## 2024-08-02 NOTE — PT/OT/SLP EVAL
Physical Therapy Evaluation    Patient Name:  Justyn Perez   MRN:  [id number]    Recommendations:     Discharge therapy intensity: Low Intensity Therapy   Discharge Equipment Recommendations: walker, rolling   Barriers to discharge: Impaired mobility and Ongoing medical needs    Assessment:     Justyn Perez is a 78 y.o. male admitted with a medical diagnosis of s/p hemicolectomy and cholecystectomy 7/30 for mass found on colonscopy.  He presents with the following impairments/functional limitations: weakness, gait instability, impaired endurance, impaired balance, impaired functional mobility.    Pt with good effort to PT eval. Pt was independent at baseline and lives alone. Pt stated that he has not recently used an AD for ambulation but has a walking stick that he would use at times. Pt does not have a rolling walker at home. Pt required min A for bed mobility and tranfers. Pt amb 100ft CGA with RW on room; no LOB or SOB noted. Recommending Low intensity therapy at this time.     Rehab Prognosis: Good; patient would benefit from acute skilled PT services to address these deficits and reach maximum level of function.    Recent Surgery: * No surgery found *      Plan:     During this hospitalization, patient would benefit from acute PT services 5 x/week to address the identified rehab impairments via gait training, therapeutic activities, therapeutic exercises and progress toward the following goals:    Plan of Care Expires:       Subjective     Chief Complaint: none stated   Patient/Family Comments/goals: return home  Pain/Comfort:   Reported of minor abdominal pain     Patients cultural, spiritual, Jain conflicts given the current situation: no    Living Environment:  Pt stated he lives alone. Pt reports that his girlfriend and godchild can assist at him at home. Pt has 14 FELIX with bilateral handrail.   Prior to admission, patients level of function was Independent.  Equipment used at home:  none.  DME owned (not currently used):  walking stick .  Upon discharge, patient will have assistance from girlfriend and godchild.    Objective:     Communicated with RN prior to session.  Patient found HOB elevated with NG tube, pulse ox (continuous), telemetry, oxygen, cardona catheter upon PT entry to room.    General Precautions: Standard, fall  Orthopedic Precautions:    Braces:    Respiratory Status: Nasal cannula, flow 2 L/min upon arrivial, SpO2 WNL, ambulated on room air SpO2 ranged from 92-95%  Blood Pressure: NT      Exams:  Cognitive Exam:  Patient is oriented to Person, Place, Time, and Situation  RLE ROM: WFL  RLE Strength: WFL  LLE ROM: WFL  LLE Strength: WFL  Skin integrity: Visible skin intact      Functional Mobility:  Bed Mobility:     Supine to Sit: minimum assistance  Transfers:     Sit to Stand:  minimum assistance with rolling walker  Gait: Pt amb 100ft CGA with RW demo decreased alicia and steady step through gait pattern. No LOB with turning. Patient amb on room; no SOB noted SpO2 92-95% with mobility.       AM-PAC 6 CLICK MOBILITY  Total Score:        Treatment & Education:      Patient provided with verbal education education regarding PT role/goals/POC, fall prevention, and safety awareness.  Understanding was verbalized.     Patient left up in chair with all lines intact, call button in reach, and RN notified.    GOALS:   Multidisciplinary Problems       Physical Therapy Goals          Problem: Physical Therapy    Goal Priority Disciplines Outcome Goal Variances Interventions   Physical Therapy Goal     PT, PT/OT Progressing     Description: Goals to be met by: 2024     Patient will increase functional independence with mobility by performin. Supine to sit with Modified [place]  2. Sit to supine with Modified [place]  3. Gait  x 200 feet with Modified [place] using Rolling Walker vs LRAD.   4. Ascend/descend 5 stair with bilateral Handrails Modified [place]  using No Assistive Device.                          History:     Past Medical History:   Diagnosis Date    Aortic stenosis     Carpal tunnel syndrome     CKD (chronic kidney disease)     Coronary artery disease     Disorder of kidney and ureter     Dyslipidemia     Hyperlipidemia     Hypertension     IGT (impaired glucose tolerance)     PVD (peripheral vascular disease)        Past Surgical History:   Procedure Laterality Date    CARDIAC VALVE REPLACEMENT      CAROTID STENT      COLONOSCOPY N/A 5/14/2024    Procedure: COLONOSCOPY;  Surgeon: Jose C Bassett MD;  [facility];  Service: Gastroenterology;  Laterality: N/A;    femoral-popliteal artery bypass      Renal artery angiogram      TAVR-TF  04/29/2020    TONSILLECTOMY         Time Tracking:     PT Received On: 08/02/24  PT Start Time: 1101     PT Stop Time: 1119  PT Total Time (min): 18 min     Billable Minutes: Evaluation Mod      08/02/2024

## 2024-08-02 NOTE — PROGRESS NOTES
Acute Care Surgery   Progress Note  Admit Date: 8/1/2024  HD#1  POD#* No surgery found *    Subjective:   Interval history:  AF HDS, SBP up to 185   Pain well controlled  Denies n/v  No BM or flatus since surgery    Home Meds:  Current Outpatient Medications   Medication Instructions    amLODIPine (NORVASC) 10 mg, Oral, Nightly    ascorbic acid (vitamin C) (VITAMIN C) 250 mg, Oral, Daily    aspirin 81 mg, Oral, Daily    atorvastatin (LIPITOR) 80 mg, Oral, Nightly    carvediloL (COREG) 25 mg, Oral, Daily    cloNIDine (CATAPRES) 0.1 mg, Oral, 2 times daily, For blood pressure >170    clopidogreL (PLAVIX) 75 mg, Oral, Daily    ezetimibe (ZETIA) 10 mg, Oral, Nightly    glucosamine/chondr sy A sod (OSTEO BI-FLEX ORAL) 1 tablet, Oral, Daily    hydrALAZINE (APRESOLINE) 100 mg, Oral, 2 times daily    multivitamin with folic acid 400 mcg Tab 1 tablet, Oral, Daily    olmesartan (BENICAR) 40 mg, Oral, Nightly    sertraline (ZOLOFT) 50 mg, Oral, Daily    traZODone (DESYREL) 100 mg, Oral, Nightly      Scheduled Meds:   amLODIPine  10 mg Oral QHS    aspirin  81 mg Oral Daily    atorvastatin  80 mg Oral QHS    carvediloL  25 mg Oral BID WM    ciprofloxacin  400 mg Intravenous Q12H    cloNIDine  0.1 mg Oral BID    clopidogreL  75 mg Oral Daily    enoxparin  40 mg Subcutaneous Daily    hydrALAZINE  100 mg Oral Q8H    metronidazole  500 mg Intravenous Q8H    mupirocin   Nasal BID    sertraline  50 mg Oral Daily    traZODone  100 mg Per NG tube QHS     Continuous Infusions:   Amino acid 4.25% - dextrose 5% (CLINIMIX-E) solution (1L provides 42.5 gm AA, 50 gm CHO (170 kcal/L dextrose), Na 35, K 30, Mg 5, Ca 4.5, Acetate 70, Cl 39, Phos 15)   Intravenous Continuous         PRN Meds:  Current Facility-Administered Medications:     acetaminophen, 650 mg, Oral, Q6H PRN    ALPRAZolam, 0.5 mg, Per NG tube, TID PRN    aluminum-magnesium hydroxide-simethicone, 30 mL, Oral, QID PRN    cloNIDine, 0.2 mg, Oral, TID PRN    hydrALAZINE, 20 mg,  "Intravenous, Q4H PRN    hydrOXYzine, 50 mg, Intramuscular, Q6H PRN    labetalol, 20 mg, Intravenous, Q4H PRN    melatonin, 6 mg, Oral, Nightly PRN    morphine, 4 mg, Intravenous, Q4H PRN    naloxone, 0.02 mg, Intravenous, PRN    ondansetron, 4 mg, Intravenous, Q4H PRN    polyethylene glycol, 17 g, Oral, BID PRN    prochlorperazine, 5 mg, Intravenous, Q6H PRN    simethicone, 1 tablet, Oral, QID PRN     Objective:     VITAL SIGNS: 24 HR MIN & MAX LAST   Temp  Min: 97.9 °F (36.6 °C)  Max: 98.7 °F (37.1 °C)  98.6 °F (37 °C)   BP  Min: 127/64  Max: 185/66  (!) 176/64    Pulse  Min: 70  Max: 99  99    Resp  Min: 18  Max: 23  18    SpO2  Min: 92 %  Max: 98 %  (!) 92 %      HT: 5' 6" (167.6 cm)  WT: 70.3 kg (155 lb)  BMI: 25     Intake/output:  Intake/Output - Last 3 Shifts         07/31 0700  08/01 0659 08/01 0700  08/02 0659 08/02 0700  08/03 0659    P.O.  260     Total Intake(mL/kg)  260 (3.7)     Urine (mL/kg/hr)  875 (0.5)     Drains  800     Total Output  1675     Net  -1415                    Intake/Output Summary (Last 24 hours) at 8/2/2024 0711  Last data filed at 8/2/2024 0545  Gross per 24 hour   Intake 260 ml   Output 1675 ml   Net -1415 ml           Lines/drains/airway:       Peripheral IV - Single Lumen 08/01/24 0432 20 G Anterior;Distal;Left Upper Arm (Active)   Site Assessment No swelling;Clean;Dry;Intact;No redness;No warmth;No drainage 08/02/24 0400   Extremity Assessment Distal to IV No abnormal discoloration;No redness;No swelling;No warmth 08/01/24 2000   Line Status Saline locked 08/02/24 0400   Dressing Status Clean;Intact;Dry 08/02/24 0400   Dressing Intervention Integrity maintained 08/02/24 0400   Number of days: 1            NG/OG Tube 07/30/24 1200 Right nostril (Active)   Placement Check placement verified by aspirate characteristics 08/01/24 1328   Distal Tube Length (cm) 70 08/01/24 2000   Tolerance no signs/symptoms of discomfort 08/01/24 2000   Securement secured to nostril center w/ " "adhesive device 08/01/24 2000   Suction Setting/Drainage Method suction at;low;intermittent setting 08/01/24 2000   Insertion Site Appearance no redness, warmth, tenderness, skin breakdown, drainage 08/01/24 2000   Drainage Green 08/01/24 2000   Tube Output(mL)(Include Discarded Residual) 600 mL 08/02/24 0545   Number of days: 2            Urethral Catheter 07/30/24 1000 (Active)   $ Isabel Insertion Bedside Insertion Performed 08/01/24 0535   Site Assessment Clean;Intact;Dry 08/01/24 2000   Collection Container Standard drainage bag 08/01/24 2000   Securement Method secured to top of thigh w/ adhesive device 08/01/24 2000   Catheter Care Performed yes 08/01/24 2000   Reason for Continuing Urinary Catheterization Urinary retention 08/01/24 2000   CAUTI Prevention Bundle Securement Device in place with 1" slack;Intact seal between catheter & drainage tubing;Drainage bag/urimeter off the floor;Sheeting clip in use;No dependent loops or kinks;Drainage bag/urimeter not overfilled (<2/3 full);Drainage bag/urimeter below bladder 08/01/24 2000   Number of days: 2       Physical examination:  Gen: NAD, AAOx3, answering questions appropriately  HEENT: atraumatic, NGT in place  CV: RR  Resp: NWOB  Abd: S/NT/ND  Ext: moving all extremities spontaneously and purposefully  Neuro: CN II-XII grossly intact  Skin/wounds: WWP, midline incision c/d/I with staples    Labs:  Renal:  Recent Labs     08/01/24  0616 08/02/24  0507   BUN 29.2* 34.1*   CREATININE 1.32* 1.27*     No results for input(s): "LACTIC" in the last 72 hours.  FENGI:  Recent Labs     08/01/24  0616 08/02/24  0507    140   K 4.1 3.8   * 109*   CO2 22* 21*   CALCIUM 8.4* 8.8   MG 2.00  --    PHOS 2.7  --    ALBUMIN 2.5* 2.7*   BILITOT 0.6 0.9   AST 25 24   ALKPHOS 70 73   ALT 20 21     Heme:  Recent Labs     08/01/24  0616 08/02/24  0507   HGB 8.9* 10.2*   HCT 28.0* 31.7*    223     ID:  Recent Labs     08/01/24  0616 08/02/24  0507   WBC 16.41* " 15.94*     CBG:  Recent Labs     08/01/24  0616 08/02/24  0507   GLUCOSE 105 124*      Cardiovascular:  Recent Labs   Lab 08/01/24  0616 08/01/24  1159 08/01/24  1827   TROPONINI 0.686* 0.700* 0.704*     I have reviewed all pertinent lab results within the past 24 hours.    Imaging:  Xray Previous   Final Result      Xray Previous   Final Result      CT Previous   Final Result      CT Previous   Final Result         I have reviewed all pertinent imaging results/findings within the past 24 hours.    Micro/Path/Other:  Microbiology Results (last 7 days)       ** No results found for the last 168 hours. **           Pathology Results  (Last 7 days)      None             Assessment & Plan:   78 y.o. male s/p right hemicolectomy and cholecystectomy 7/30 for mass found on colonoscopy. Transferred for cardiology services. General surgery consulted to manage NG tube and post operative care.      - NG tube to low intermittent wall suction. Await return of bowel function.   - mIVF   - NPO  - Okay for meds per NG tube   - Okay to leave incision without dressing   - PRN pain and nausea   - Rest of care per primary       Nery Henderson MD  [employer] General Surgery, PGY-1  08/02/2024

## 2024-08-02 NOTE — PLAN OF CARE
Problem: Physical Therapy  Goal: Physical Therapy Goal  Description: Goals to be met by: 2024     Patient will increase functional independence with mobility by performin. Supine to sit with Modified Stephens  2. Sit to supine with Modified Stephens  3. Gait  x 200 feet with Modified Stephens using Rolling Walker vs LRAD.   4. Ascend/descend 5 stair with bilateral Handrails Modified Stephens using No Assistive Device.     Outcome: Progressing

## 2024-08-02 NOTE — PLAN OF CARE
08/02/24 1244   Final Note   Anticipated Discharge Disposition Short Term   Post-Acute Status   Post-Acute Authorization Other     Transfer back to [facility].

## 2024-08-02 NOTE — PROGRESS NOTES
"[facility] Hospital Medicine Progress Note      Chief Complaint: chest pain      Screening for Social Drivers for health:  Patient screened for food insecurity, housing instability, transportation needs, utility difficulties, and interpersonal safety (select all that apply as identified as concern)  []Housing or Food  []Transportation Needs  []Utility Difficulties  []Interpersonal safety  [x]None     Patient information was obtained from patient, patient's family, past medical records and/or ER records.      HISTORY OF PRESENT ILLNESS:   Justyn Perez is a 78 y.o. male who PMH includes aortic stenosis, CKD, CAD, PVD, PAD s/p aortobifemoral bypass, valvular heart disease s/p TAVR, pacemaker/AICD, CHF; presented transferred from [facility] for cardiology services for NSTEMI and chest pain. PT was admitted there for cholecystectomy and alessandro colectomy for colon tumor done by Dr Jaden Lara.  PT reports tumor "may be cancer" but he does not have the pathology results yet. PT began to have chest pain, no cardiology services available in outlying facility, pt was transferred to cardiology services and higher level of care.  Lab work reviewed demonstrated WBCs 16.41, H&H 8.9/28.0, chloride 110, CO2 22, BUN 29.2, creatinine 1.32, troponin 0.686 will repeat value 0.700; other indices unremarkable.  Initial vital signs /84 pulse 81 respirations 16 temperature 98.6° F O2 saturation 96% on 2 L per nasal cannula.  Patient is admitted to hospital medicine services for further management.  Cardiology Services have been consulted.      Exam  General appearance: chronically ill appearing, fatigued, non-toxic, elderly male; no family at bedside  HENT: Atraumatic head. Moist mucous membranes of oral cavity.  Eyes: PERRL  Lungs: diminished bilaterally. No wheezing present.   Heart: Regular rate and rhythm. S1 and S2 present with no murmurs/gallop/rub. No pedal edema. No JVD " present.   Abdomen: Soft, non-distended, generalized TTP,  Bowel sounds are normal, NG tube  Extremities: No cyanosis, clubbing, or edema.  Skin: warm and dry  Neuro: oriented x 3 no acute focal deficits  Psych/mental status: flat affect, cooperative       ASSESSMENT & PLAN:   ASSESSMENT:  Acute chest pain- POA  NSTEMI- suspected type II- POA  Elevated troponin- POA  Colon tumor s/p hemicolectomy- POA  Right renal mass- suspicion for malignancy- POA  Wealkness- POA     Hx of CAD, CHF, arthritis, HDL, HTN,  carotid artery stenosis, anemia,  anxiety, depression, CINDY, PVD, PAD        PLAN:  Consult Cardiology Services appreciate assistance and recommendations   Consult surgery Services appreciate assistance and recommendations   Continue with routine postoperative management maintain NG tube  Resume home medication as deemed necessary per NG tube  Fall precautions   Accurate I&O   PRN pain management   Trend out troponin level   Repeat lab work in a.m.        VTE Prophylaxis: Lovenox for DVT prophylaxis and will be advised to be as mobile as possible and sit in a chair as tolerated     Patient condition:  Stable     VITAL SIGNS: 24 HRS MIN & MAX LAST   Temp  Min: 97.9 °F (36.6 °C)  Max: 98.7 °F (37.1 °C) 98.6 °F (37 °C)   BP  Min: 127/64  Max: 185/66 (!) 176/64   Pulse  Min: 70  Max: 99  99   Resp  Min: 18  Max: 23 18   SpO2  Min: 92 %  Max: 98 % (!) 92 %     I have reviewed the following labs:  Recent Labs   Lab 08/01/24  0616 08/02/24  0507   WBC 16.41* 15.94*   RBC 3.14* 3.58*   HGB 8.9* 10.2*   HCT 28.0* 31.7*   MCV 89.2 88.5   MCH 28.3 28.5   MCHC 31.8* 32.2*   RDW 17.1* 17.2*    223   MPV 10.8* 10.8*     Recent Labs   Lab 08/01/24  0616 08/02/24  0507    140   K 4.1 3.8   * 109*   CO2 22* 21*   BUN 29.2* 34.1*   CREATININE 1.32* 1.27*   CALCIUM 8.4* 8.8   MG 2.00  --    ALBUMIN 2.5* 2.7*   ALKPHOS 70 73   ALT 20 21   AST 25 24   BILITOT 0.6 0.9     Microbiology Results (last 7 days)       ** No  results found for the last 168 hours. **             See below for Radiology    Assessment/Plan:      VTE prophylaxis:     Patient condition:  Stable/Fair/Guarded/ Serious/ Critical    Anticipated discharge and Disposition:         All diagnosis and differential diagnosis have been reviewed; assessment and plan has been documented; I have personally reviewed the labs and test results that are presently available; I have reviewed the patients medication list; I have reviewed the consulting providers response and recommendations. I have reviewed or attempted to review medical records based upon their availability    All of the patient's questions have been  addressed and answered. Patient's is agreeable to the above stated plan. I will continue to monitor closely and make adjustments to medical management as needed.    Portions of this note dictated using EMR integrated voice recognition software, and may be subject to voice recognition errors not corrected at proofreading. Please contact writer for clarification if needed.   _____________________________________________________________________    Malnutrition Status:    Scheduled Med:   amLODIPine  10 mg Oral QHS    aspirin  81 mg Oral Daily    atorvastatin  80 mg Oral QHS    carvediloL  25 mg Oral BID WM    ciprofloxacin  400 mg Intravenous Q12H    cloNIDine  0.1 mg Oral BID    clopidogreL  75 mg Oral Daily    enoxparin  40 mg Subcutaneous Daily    hydrALAZINE  100 mg Oral Q8H    metronidazole  500 mg Intravenous Q8H    mupirocin   Nasal BID    sertraline  50 mg Oral Daily    traZODone  100 mg Per NG tube QHS      Continuous Infusions:   Amino acid 4.25% - dextrose 5% (CLINIMIX-E) solution (1L provides 42.5 gm AA, 50 gm CHO (170 kcal/L dextrose), Na 35, K 30, Mg 5, Ca 4.5, Acetate 70, Cl 39, Phos 15)   Intravenous Continuous          PRN Meds:    Current Facility-Administered Medications:     acetaminophen, 650 mg, Oral, Q6H PRN    ALPRAZolam, 0.5 mg, Per NG tube,  TID PRN    aluminum-magnesium hydroxide-simethicone, 30 mL, Oral, QID PRN    cloNIDine, 0.2 mg, Oral, TID PRN    hydrALAZINE, 20 mg, Intravenous, Q4H PRN    hydrOXYzine, 50 mg, Intramuscular, Q6H PRN    labetalol, 20 mg, Intravenous, Q4H PRN    melatonin, 6 mg, Oral, Nightly PRN    morphine, 4 mg, Intravenous, Q4H PRN    naloxone, 0.02 mg, Intravenous, PRN    ondansetron, 4 mg, Intravenous, Q4H PRN    polyethylene glycol, 17 g, Oral, BID PRN    prochlorperazine, 5 mg, Intravenous, Q6H PRN    simethicone, 1 tablet, Oral, QID PRN     Radiology:  I have personally reviewed the following imaging and agree with the radiologist.     NM PET CT FDG Skull Base to Mid Thigh  Narrative: EXAMINATION:  NM PET CT FDG SKULL BASE TO MID THIGH    CLINICAL HISTORY:  d12.7; Benign neoplasm of rectosigmoid junction    TECHNIQUE:  10.4 mCi of F18-FDG was administered intravenously.  After an approximately 60 min distribution time, PET/CT images were acquired from the skull base to the mid thigh. Transmission images were acquired to correct for attenuation using a whole body low-dose CT scan without contrast with the arms positioned above the head.    COMPARISON:  CT of the chest abdomen and pelvis 03/06/2020.    FINDINGS:  Head and neck: There is symmetric and physiologic distribution of radiotracer throughout the included brain.  There is no hemorrhage, hydrocephalus, or midline shift.  There is no FDG avid cervical lymphadenopathy.    Chest: There is no FDG avid mediastinal or hilar lymphadenopathy.  There is a pacing device in the left upper chest.  There is no FDG avid pulmonary nodule or mass.  There is no pleural effusion.    Abdomen and pelvis: There is symmetric and physiologic distribution of radiotracer throughout the liver, spleen, and collecting system.  There is a focal area of soft tissue thickening and associated hypermetabolic activity in the colon at the level of the a Paddock flexure.  This is best visualized on image  139 and demonstrates an SUV max of 15.0.  There is no FDG avid mesenteric, pelvic, or retroperitoneal adenopathy.    Musculoskeletal: There is a lytic lesion in the L4 vertebral body measuring 2.2 x 1.3 cm with an SUV max of 3.0.  This is best visualized on image 165.  Impression: 1. Hypermetabolic soft tissue thickening of the colon at the level of the hepatic flexure consistent with a primary colonic malignancy.  2. 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    Electronically signed by: Walt Lara MD  Date:    07/05/2024  Time:    18:34      Wally Galan MD  Department of Hospital Medicine   [facility]   08/02/2024

## 2024-08-09 DIAGNOSIS — N28.89: ICD-10-CM

## 2024-08-09 DIAGNOSIS — M89.9 BONE LESION: Primary | ICD-10-CM

## 2024-08-10 NOTE — DISCHARGE SUMMARY
"[facility]  Hospital Medicine Discharge Summary    Admit Date: 8/1/2024  Discharge Date and Time: 8/10/06198:36 PM  Admitting Physician:  Team  Discharging Physician: Wally Galan MD.  Primary Care Physician: Rodney Jalloh NP  Consults: Cardiology and Hospital Medicine    Discharge Diagnoses:  Acute chest pain- POA, RESOLVED   NSTEMI-  type II- POA  Elevated troponin- POA  Colon tumor s/p hemicolectomy- POA  Right renal mass- suspicion for malignancy- POA  Weakness- POA     Hx of CAD, CHF, arthritis, HDL, HTN,  carotid artery stenosis, anemia,  anxiety, depression, CINDY, PVD, PAD     Hospital Course:   Chief Complaint: chest pain      Patient information was obtained from patient, patient's family, past medical records and/or ER records.      HISTORY OF PRESENT ILLNESS:   Justyn Perez is a 78 y.o. male who PMH includes aortic stenosis, CKD, CAD, PVD, PAD s/p aortobifemoral bypass, valvular heart disease s/p TAVR, pacemaker/AICD, CHF; presented transferred from [facility] for cardiology services for NSTEMI and chest pain. PT was admitted there for cholecystectomy and alessandro colectomy for colon tumor done by Dr Jaden Lara.  PT reports tumor "may be cancer" but he does not have the pathology results yet. PT began to have chest pain, no cardiology services available in outlying facility, pt was transferred to cardiology services and higher level of care.  Lab work reviewed demonstrated WBCs 16.41, H&H 8.9/28.0, chloride 110, CO2 22, BUN 29.2, creatinine 1.32, troponin 0.686 will repeat value 0.700; other indices unremarkable.  Initial vital signs /84 pulse 81 respirations 16 temperature 98.6° F O2 saturation 96% on 2 L per nasal cannula.  Patient is admitted to hospital medicine services for further management.  Cardiology Services have been consulted.        Plan:   EKG reviewed   Cont. To trend trop  Cont. Lovenox   Due to recent surgical " "intervention and unknown results of outpatient EGD unable to proceed with [facility] at this time ~ patient has been chest pain free since he arrived at [facility]  No need for acute ischemic evaluation   CardioloGY CLEARED FOR DISCHARGE        Exam  General appearance: chronically ill appearing, fatigued, non-toxic, elderly male; no family at bedside  HENT: Atraumatic head. Moist mucous membranes of oral cavity.  Eyes: PERRL  Lungs: diminished bilaterally. No wheezing present.   Heart: Regular rate and rhythm. S1 and S2 present with no murmurs/gallop/rub. No pedal edema. No JVD present.   Abdomen: Soft, non-distended, generalized TTP,  Bowel sounds are normal, NG tube  Extremities: No cyanosis, clubbing, or edema.  Skin: warm and dry  Neuro: oriented x 3 no acute focal deficits  Psych/mental status: flat affect, cooperative       Pt was seen and examined on the day of discharge  Vitals:  VITAL SIGNS: 24 HRS MIN & MAX LAST   No data recorded 97.6 °F (36.4 °C)   No data recorded (!) 175/74   No data recorded  91   No data recorded 18   No data recorded (!) 94 %       Procedures Performed: No admission procedures for hospital encounter.     Significant Diagnostic Studies: See Full reports for all details    No results for input(s): "WBC", "RBC", "HGB", "HCT", "MCV", "MCH", "MCHC", "RDW", "PLT", "MPV", "GRAN", "LYMPH", "MONO", "BASO", "NRBC" in the last 168 hours.    No results for input(s): "NA", "K", "CL", "CO2", "ANIONGAP", "BUN", "CREATININE", "GLU", "CALCIUM", "PH", "MG", "ALBUMIN", "PROT", "ALKPHOS", "ALT", "AST", "BILITOT" in the last 168 hours.     Microbiology Results (last 7 days)       ** No results found for the last 168 hours. **             X-Ray Abdomen AP 1 View  Narrative: EXAMINATION:  XR ABDOMEN AP 1 VIEW    CLINICAL HISTORY:  Other fecal abnormalities worsening distention, obstipation;    TECHNIQUE:  AP X-RAY OF THE ABDOMEN:    CPT 11676    FINDINGS:  Examination reveals some residual feces throughout the colon " the gas pattern is nonspecific with no clear evidence of ileus or obstruction no abnormal masses or calcifications identified.    Nasogastric tube is seen with the tip in the fundus of the stomach  Impression: Nonspecific gas pattern.    Nasogastric tube as above    Electronically signed by: Geovani Malagon  Date:    08/02/2024  Time:    10:46         Medication List        START taking these medications      ciprofloxacin HCl 500 MG tablet  Commonly known as: CIPRO  1 tablet (500 mg total) by Per G Tube route 2 (two) times daily. for 10 days     metroNIDAZOLE 500 MG tablet  Commonly known as: FLAGYL  1 tablet (500 mg total) by Per G Tube route every 8 (eight) hours. for 10 days            CHANGE how you take these medications      carvediloL 25 MG tablet  Commonly known as: COREG  Take 1 tablet (25 mg total) by mouth 2 (two) times daily with meals.  What changed: when to take this     cloNIDine 0.1 MG tablet  Commonly known as: CATAPRES  Take 1 tablet (0.1 mg total) by mouth 2 (two) times daily.  What changed: additional instructions     hydrALAZINE 100 MG tablet  Commonly known as: APRESOLINE  Take 1 tablet (100 mg total) by mouth every 8 (eight) hours.  What changed: when to take this            CONTINUE taking these medications      amLODIPine 10 MG tablet  Commonly known as: NORVASC     ascorbic acid (vitamin C) 250 MG tablet  Commonly known as: VITAMIN C     aspirin 81 MG Chew     atorvastatin 80 MG tablet  Commonly known as: LIPITOR     clopidogreL 75 mg tablet  Commonly known as: PLAVIX     ezetimibe 10 mg tablet  Commonly known as: ZETIA     multivitamin with folic acid 400 mcg Tab     OSTEO BI-FLEX ORAL     sertraline 50 MG tablet  Commonly known as: ZOLOFT  Take 1 tablet (50 mg total) by mouth once daily.     traZODone 100 MG tablet  Commonly known as: DESYREL  Take 1 tablet (100 mg total) by mouth every evening.            STOP taking these medications      olmesartan 40 MG tablet  Commonly known as:  MANUEL               Where to Get Your Medications        These medications were sent to [facility] - [place], [place] - [address] [place]  [address] [facility] [address]      Phone: [phone]   carvediloL 25 MG tablet  ciprofloxacin HCl 500 MG tablet  cloNIDine 0.1 MG tablet  hydrALAZINE 100 MG tablet  metroNIDAZOLE 500 MG tablet          Explained in detail to the patient about the discharge plan, medications, and follow-up visits. Pt understands and agrees with the treatment plan  Discharge Disposition: Another Health Care Institution Not Defined   Discharged Condition: stable  Diet-    Medications Per DC med rec  Activities as tolerated   Follow-up Information       Rodney Jalloh, NP Follow up.    Specialty: Family Medicine  Why: As needed  Contact information:  [address] [place] [address]/[facility] [address]  [phone]                           For further questions contact hospitalist office    Discharge time 33 minutes    For worsening symptoms, chest pain, shortness of breath, increased abdominal pain, high grade fever, stroke or stroke like symptoms, immediately go to the nearest Emergency Room or call 911 as soon as possible.      Wally Means M.D on 8/2/24. at 2:36 PM.

## 2024-09-25 ENCOUNTER — HOSPITAL ENCOUNTER (OUTPATIENT)
Dept: RADIOLOGY | Facility: HOSPITAL | Age: 79
Discharge: HOME | End: 2024-09-25
Attending: INTERNAL MEDICINE
Payer: MEDICARE

## 2024-09-25 DIAGNOSIS — N28.89: ICD-10-CM

## 2024-09-25 DIAGNOSIS — M89.9 BONE LESION: ICD-10-CM

## 2024-09-25 PROCEDURE — A9577 INJ MULTIHANCE: HCPCS

## 2024-09-25 PROCEDURE — 25500020 PHARM REV CODE 255

## 2024-09-25 PROCEDURE — 74183 MRI ABD W/O CNTR FLWD CNTR: CPT | Mod: TC

## 2024-09-25 PROCEDURE — 72158 MRI LUMBAR SPINE W/O & W/DYE: CPT | Mod: TC

## 2024-09-25 RX ADMIN — GADOBENATE DIMEGLUMINE 14 ML: 529 INJECTION, SOLUTION INTRAVENOUS at 01:09

## 2025-03-29 ENCOUNTER — HOSPITAL ENCOUNTER (OUTPATIENT)
Facility: HOSPITAL | Age: 80
Discharge: HOME | End: 2025-03-30
Attending: STUDENT IN AN ORGANIZED HEALTH CARE EDUCATION/TRAINING PROGRAM | Admitting: INTERNAL MEDICINE
Payer: MEDICARE

## 2025-03-29 DIAGNOSIS — J44.1 COPD EXACERBATION: ICD-10-CM

## 2025-03-29 DIAGNOSIS — R06.02 SOB (SHORTNESS OF BREATH): ICD-10-CM

## 2025-03-29 DIAGNOSIS — J96.01 ACUTE RESPIRATORY FAILURE WITH HYPOXIA: Primary | ICD-10-CM

## 2025-03-29 DIAGNOSIS — I21.4 NSTEMI (NON-ST ELEVATED MYOCARDIAL INFARCTION): ICD-10-CM

## 2025-03-29 PROBLEM — I50.33 ACUTE ON CHRONIC DIASTOLIC CHF (CONGESTIVE HEART FAILURE): Status: ACTIVE | Noted: 2025-03-29

## 2025-03-29 LAB
ALBUMIN SERPL-MCNC: 3.9 G/DL (ref 3.4–4.8)
ALBUMIN/GLOB SERPL: 1 RATIO (ref 1.1–2)
ALP SERPL-CCNC: 120 UNIT/L (ref 40–150)
ALT SERPL-CCNC: 18 UNIT/L (ref 0–55)
ANION GAP SERPL CALC-SCNC: 8 MEQ/L
APTT PPP: 28.3 SECONDS (ref 24.5–32.8)
AST SERPL-CCNC: 23 UNIT/L (ref 11–45)
BACTERIA #/AREA URNS AUTO: ABNORMAL /HPF
BASOPHILS # BLD AUTO: 0.05 X10(3)/MCL
BASOPHILS NFR BLD AUTO: 0.5 %
BILIRUB SERPL-MCNC: 0.5 MG/DL
BILIRUB UR QL STRIP.AUTO: NEGATIVE
BNP BLD-MCNC: 2861.4 PG/ML
BUN SERPL-MCNC: 26 MG/DL (ref 8.4–25.7)
CALCIUM SERPL-MCNC: 9.4 MG/DL (ref 8.8–10)
CHLORIDE SERPL-SCNC: 111 MMOL/L (ref 98–107)
CLARITY UR: CLEAR
CO2 SERPL-SCNC: 23 MMOL/L (ref 23–31)
COLOR UR AUTO: YELLOW
CREAT SERPL-MCNC: 1.23 MG/DL (ref 0.72–1.25)
CREAT/UREA NIT SERPL: 21
EOSINOPHIL # BLD AUTO: 0.39 X10(3)/MCL (ref 0–0.9)
EOSINOPHIL NFR BLD AUTO: 4.2 %
ERYTHROCYTE [DISTWIDTH] IN BLOOD BY AUTOMATED COUNT: 15.9 % (ref 11.5–17)
FLUAV AG UPPER RESP QL IA.RAPID: NOT DETECTED
FLUBV AG UPPER RESP QL IA.RAPID: NOT DETECTED
GFR SERPLBLD CREATININE-BSD FMLA CKD-EPI: 59 ML/MIN/1.73/M2
GLOBULIN SER-MCNC: 3.9 GM/DL (ref 2.4–3.5)
GLUCOSE SERPL-MCNC: 138 MG/DL (ref 82–115)
GLUCOSE UR QL STRIP: NEGATIVE
HCT VFR BLD AUTO: 36.7 % (ref 42–52)
HGB BLD-MCNC: 11.6 G/DL (ref 14–18)
HGB UR QL STRIP: NEGATIVE
IMM GRANULOCYTES # BLD AUTO: 0.01 X10(3)/MCL (ref 0–0.04)
IMM GRANULOCYTES NFR BLD AUTO: 0.1 %
INR PPP: 1.2
KETONES UR QL STRIP: NEGATIVE
LEUKOCYTE ESTERASE UR QL STRIP: NEGATIVE
LYMPHOCYTES # BLD AUTO: 1.8 X10(3)/MCL (ref 0.6–4.6)
LYMPHOCYTES NFR BLD AUTO: 19.3 %
MCH RBC QN AUTO: 28 PG (ref 27–31)
MCHC RBC AUTO-ENTMCNC: 31.6 G/DL (ref 33–36)
MCV RBC AUTO: 88.4 FL (ref 80–94)
MONOCYTES # BLD AUTO: 0.55 X10(3)/MCL (ref 0.1–1.3)
MONOCYTES NFR BLD AUTO: 5.9 %
NEUTROPHILS # BLD AUTO: 6.51 X10(3)/MCL (ref 2.1–9.2)
NEUTROPHILS NFR BLD AUTO: 70 %
NITRITE UR QL STRIP: NEGATIVE
NRBC BLD AUTO-RTO: 0 %
PH UR STRIP: 6.5 [PH]
PLATELET # BLD AUTO: 285 X10(3)/MCL (ref 130–400)
PMV BLD AUTO: 10.5 FL (ref 7.4–10.4)
POTASSIUM SERPL-SCNC: 4.1 MMOL/L (ref 3.5–5.1)
PROT SERPL-MCNC: 7.8 GM/DL (ref 5.8–7.6)
PROT UR QL STRIP: ABNORMAL
PROTHROMBIN TIME: 12 SECONDS (ref 9.3–11.4)
RBC # BLD AUTO: 4.15 X10(6)/MCL (ref 4.7–6.1)
RBC #/AREA URNS AUTO: ABNORMAL /HPF
RSV A 5' UTR RNA NPH QL NAA+PROBE: NOT DETECTED
SARS-COV-2 RNA RESP QL NAA+PROBE: NOT DETECTED
SODIUM SERPL-SCNC: 142 MMOL/L (ref 136–145)
SP GR UR STRIP.AUTO: 1.02 (ref 1–1.03)
SQUAMOUS #/AREA URNS AUTO: ABNORMAL /HPF
TROPONIN I SERPL-MCNC: 0.04 NG/ML (ref 0–0.04)
TROPONIN I SERPL-MCNC: 0.06 NG/ML (ref 0–0.04)
TROPONIN I SERPL-MCNC: 0.11 NG/ML (ref 0–0.04)
UROBILINOGEN UR STRIP-ACNC: 0.2
WBC # BLD AUTO: 9.31 X10(3)/MCL (ref 4.5–11.5)
WBC #/AREA URNS AUTO: ABNORMAL /HPF

## 2025-03-29 PROCEDURE — 96366 THER/PROPH/DIAG IV INF ADDON: CPT

## 2025-03-29 PROCEDURE — 63600175 PHARM REV CODE 636 W HCPCS: Performed by: STUDENT IN AN ORGANIZED HEALTH CARE EDUCATION/TRAINING PROGRAM

## 2025-03-29 PROCEDURE — 85610 PROTHROMBIN TIME: CPT | Performed by: STUDENT IN AN ORGANIZED HEALTH CARE EDUCATION/TRAINING PROGRAM

## 2025-03-29 PROCEDURE — 25000242 PHARM REV CODE 250 ALT 637 W/ HCPCS: Performed by: STUDENT IN AN ORGANIZED HEALTH CARE EDUCATION/TRAINING PROGRAM

## 2025-03-29 PROCEDURE — 96365 THER/PROPH/DIAG IV INF INIT: CPT

## 2025-03-29 PROCEDURE — 99291 CRITICAL CARE FIRST HOUR: CPT

## 2025-03-29 PROCEDURE — 25000003 PHARM REV CODE 250: Performed by: STUDENT IN AN ORGANIZED HEALTH CARE EDUCATION/TRAINING PROGRAM

## 2025-03-29 PROCEDURE — 85730 THROMBOPLASTIN TIME PARTIAL: CPT | Performed by: STUDENT IN AN ORGANIZED HEALTH CARE EDUCATION/TRAINING PROGRAM

## 2025-03-29 PROCEDURE — 80053 COMPREHEN METABOLIC PANEL: CPT | Performed by: STUDENT IN AN ORGANIZED HEALTH CARE EDUCATION/TRAINING PROGRAM

## 2025-03-29 PROCEDURE — G0378 HOSPITAL OBSERVATION PER HR: HCPCS

## 2025-03-29 PROCEDURE — 84484 ASSAY OF TROPONIN QUANT: CPT | Performed by: STUDENT IN AN ORGANIZED HEALTH CARE EDUCATION/TRAINING PROGRAM

## 2025-03-29 PROCEDURE — 27000221 HC OXYGEN, UP TO 24 HOURS

## 2025-03-29 PROCEDURE — 0241U COVID/RSV/FLU A&B PCR: CPT | Performed by: STUDENT IN AN ORGANIZED HEALTH CARE EDUCATION/TRAINING PROGRAM

## 2025-03-29 PROCEDURE — 36415 COLL VENOUS BLD VENIPUNCTURE: CPT | Performed by: STUDENT IN AN ORGANIZED HEALTH CARE EDUCATION/TRAINING PROGRAM

## 2025-03-29 PROCEDURE — 96375 TX/PRO/DX INJ NEW DRUG ADDON: CPT

## 2025-03-29 PROCEDURE — 81003 URINALYSIS AUTO W/O SCOPE: CPT | Performed by: STUDENT IN AN ORGANIZED HEALTH CARE EDUCATION/TRAINING PROGRAM

## 2025-03-29 PROCEDURE — 85025 COMPLETE CBC W/AUTO DIFF WBC: CPT | Performed by: STUDENT IN AN ORGANIZED HEALTH CARE EDUCATION/TRAINING PROGRAM

## 2025-03-29 PROCEDURE — 94640 AIRWAY INHALATION TREATMENT: CPT | Mod: XB

## 2025-03-29 PROCEDURE — 94761 N-INVAS EAR/PLS OXIMETRY MLT: CPT

## 2025-03-29 PROCEDURE — 83880 ASSAY OF NATRIURETIC PEPTIDE: CPT | Performed by: STUDENT IN AN ORGANIZED HEALTH CARE EDUCATION/TRAINING PROGRAM

## 2025-03-29 PROCEDURE — 93005 ELECTROCARDIOGRAM TRACING: CPT

## 2025-03-29 RX ORDER — ACETAMINOPHEN 325 MG/1
650 TABLET ORAL EVERY 6 HOURS PRN
Status: DISCONTINUED | OUTPATIENT
Start: 2025-03-29 | End: 2025-03-30 | Stop reason: HOSPADM

## 2025-03-29 RX ORDER — ONDANSETRON 4 MG/1
4 TABLET, FILM COATED ORAL EVERY 6 HOURS PRN
Status: DISCONTINUED | OUTPATIENT
Start: 2025-03-29 | End: 2025-03-30 | Stop reason: HOSPADM

## 2025-03-29 RX ORDER — SACUBITRIL AND VALSARTAN 49; 51 MG/1; MG/1
1 TABLET, FILM COATED ORAL 2 TIMES DAILY
COMMUNITY
Start: 2025-03-03

## 2025-03-29 RX ORDER — ACETAMINOPHEN 500 MG
1000 TABLET ORAL EVERY 6 HOURS PRN
Status: DISCONTINUED | OUTPATIENT
Start: 2025-03-29 | End: 2025-03-29

## 2025-03-29 RX ORDER — PANTOPRAZOLE SODIUM 40 MG/1
40 TABLET, DELAYED RELEASE ORAL EVERY MORNING
COMMUNITY
Start: 2025-02-25

## 2025-03-29 RX ORDER — DEXAMETHASONE SODIUM PHOSPHATE 4 MG/ML
8 INJECTION, SOLUTION INTRA-ARTICULAR; INTRALESIONAL; INTRAMUSCULAR; INTRAVENOUS; SOFT TISSUE
Status: COMPLETED | OUTPATIENT
Start: 2025-03-29 | End: 2025-03-29

## 2025-03-29 RX ORDER — ACETAMINOPHEN 500 MG
1000 TABLET ORAL EVERY 6 HOURS PRN
Status: DISCONTINUED | OUTPATIENT
Start: 2025-03-29 | End: 2025-03-30 | Stop reason: HOSPADM

## 2025-03-29 RX ORDER — NAPROXEN SODIUM 220 MG/1
162 TABLET, FILM COATED ORAL
Status: COMPLETED | OUTPATIENT
Start: 2025-03-29 | End: 2025-03-29

## 2025-03-29 RX ORDER — HEPARIN SODIUM,PORCINE/D5W 25000/250
0-40 INTRAVENOUS SOLUTION INTRAVENOUS CONTINUOUS
Status: DISCONTINUED | OUTPATIENT
Start: 2025-03-29 | End: 2025-03-30

## 2025-03-29 RX ORDER — TRAMADOL HYDROCHLORIDE 50 MG/1
50 TABLET ORAL EVERY 6 HOURS PRN
Status: DISCONTINUED | OUTPATIENT
Start: 2025-03-29 | End: 2025-03-30 | Stop reason: HOSPADM

## 2025-03-29 RX ORDER — PANTOPRAZOLE SODIUM 40 MG/1
40 TABLET, DELAYED RELEASE ORAL EVERY MORNING
Status: DISCONTINUED | OUTPATIENT
Start: 2025-03-30 | End: 2025-03-30 | Stop reason: HOSPADM

## 2025-03-29 RX ORDER — IPRATROPIUM BROMIDE AND ALBUTEROL SULFATE 2.5; .5 MG/3ML; MG/3ML
3 SOLUTION RESPIRATORY (INHALATION)
Status: COMPLETED | OUTPATIENT
Start: 2025-03-29 | End: 2025-03-29

## 2025-03-29 RX ORDER — EZETIMIBE 10 MG/1
10 TABLET ORAL NIGHTLY
Status: DISCONTINUED | OUTPATIENT
Start: 2025-03-29 | End: 2025-03-30 | Stop reason: HOSPADM

## 2025-03-29 RX ORDER — DIPHENHYDRAMINE HYDROCHLORIDE 50 MG/ML
50 INJECTION, SOLUTION INTRAMUSCULAR; INTRAVENOUS EVERY 6 HOURS PRN
Status: DISCONTINUED | OUTPATIENT
Start: 2025-03-29 | End: 2025-03-30 | Stop reason: HOSPADM

## 2025-03-29 RX ORDER — BENZONATATE 100 MG/1
100 CAPSULE ORAL 3 TIMES DAILY PRN
Status: DISCONTINUED | OUTPATIENT
Start: 2025-03-29 | End: 2025-03-30 | Stop reason: HOSPADM

## 2025-03-29 RX ORDER — IPRATROPIUM BROMIDE AND ALBUTEROL SULFATE 2.5; .5 MG/3ML; MG/3ML
3 SOLUTION RESPIRATORY (INHALATION)
Status: DISCONTINUED | OUTPATIENT
Start: 2025-03-29 | End: 2025-03-30 | Stop reason: HOSPADM

## 2025-03-29 RX ORDER — AMLODIPINE BESYLATE 5 MG/1
10 TABLET ORAL NIGHTLY
Status: DISCONTINUED | OUTPATIENT
Start: 2025-03-30 | End: 2025-03-30 | Stop reason: HOSPADM

## 2025-03-29 RX ORDER — ATORVASTATIN CALCIUM 40 MG/1
80 TABLET, FILM COATED ORAL NIGHTLY
Status: DISCONTINUED | OUTPATIENT
Start: 2025-03-29 | End: 2025-03-30 | Stop reason: HOSPADM

## 2025-03-29 RX ORDER — TRAZODONE HYDROCHLORIDE 50 MG/1
100 TABLET ORAL NIGHTLY
Status: DISCONTINUED | OUTPATIENT
Start: 2025-03-29 | End: 2025-03-30 | Stop reason: HOSPADM

## 2025-03-29 RX ORDER — CLOPIDOGREL BISULFATE 75 MG/1
75 TABLET ORAL DAILY
Status: DISCONTINUED | OUTPATIENT
Start: 2025-03-30 | End: 2025-03-30 | Stop reason: HOSPADM

## 2025-03-29 RX ORDER — CLONIDINE HYDROCHLORIDE 0.1 MG/1
0.1 TABLET ORAL
Status: COMPLETED | OUTPATIENT
Start: 2025-03-29 | End: 2025-03-29

## 2025-03-29 RX ORDER — CARVEDILOL 12.5 MG/1
25 TABLET ORAL 2 TIMES DAILY
Status: DISCONTINUED | OUTPATIENT
Start: 2025-03-29 | End: 2025-03-29

## 2025-03-29 RX ORDER — SODIUM CHLORIDE 0.9 % (FLUSH) 0.9 %
10 SYRINGE (ML) INJECTION
Status: DISCONTINUED | OUTPATIENT
Start: 2025-03-29 | End: 2025-03-30 | Stop reason: HOSPADM

## 2025-03-29 RX ORDER — FUROSEMIDE 10 MG/ML
20 INJECTION INTRAMUSCULAR; INTRAVENOUS
Status: COMPLETED | OUTPATIENT
Start: 2025-03-29 | End: 2025-03-29

## 2025-03-29 RX ORDER — AMLODIPINE BESYLATE 5 MG/1
10 TABLET ORAL
Status: COMPLETED | OUTPATIENT
Start: 2025-03-29 | End: 2025-03-29

## 2025-03-29 RX ORDER — NITROGLYCERIN 0.4 MG/1
0.4 TABLET SUBLINGUAL
Status: COMPLETED | OUTPATIENT
Start: 2025-03-29 | End: 2025-03-29

## 2025-03-29 RX ORDER — NITROGLYCERIN 0.4 MG/1
TABLET SUBLINGUAL
COMMUNITY
Start: 2024-08-09

## 2025-03-29 RX ORDER — PREDNISONE 20 MG/1
40 TABLET ORAL DAILY
Status: DISCONTINUED | OUTPATIENT
Start: 2025-03-30 | End: 2025-03-30 | Stop reason: HOSPADM

## 2025-03-29 RX ORDER — FUROSEMIDE 40 MG/1
40 TABLET ORAL DAILY
Status: DISCONTINUED | OUTPATIENT
Start: 2025-03-30 | End: 2025-03-30 | Stop reason: HOSPADM

## 2025-03-29 RX ORDER — CEFTRIAXONE 1 G/1
1 INJECTION, POWDER, FOR SOLUTION INTRAMUSCULAR; INTRAVENOUS
Status: DISCONTINUED | OUTPATIENT
Start: 2025-03-29 | End: 2025-03-30 | Stop reason: HOSPADM

## 2025-03-29 RX ORDER — SERTRALINE HYDROCHLORIDE 50 MG/1
50 TABLET, FILM COATED ORAL DAILY
Status: DISCONTINUED | OUTPATIENT
Start: 2025-03-30 | End: 2025-03-30 | Stop reason: HOSPADM

## 2025-03-29 RX ORDER — TALC
6 POWDER (GRAM) TOPICAL NIGHTLY PRN
Status: DISCONTINUED | OUTPATIENT
Start: 2025-03-29 | End: 2025-03-30 | Stop reason: HOSPADM

## 2025-03-29 RX ORDER — CARVEDILOL 12.5 MG/1
25 TABLET ORAL ONCE
Status: COMPLETED | OUTPATIENT
Start: 2025-03-29 | End: 2025-03-29

## 2025-03-29 RX ORDER — FUROSEMIDE 40 MG/1
40 TABLET ORAL DAILY PRN
Status: ON HOLD | COMMUNITY
Start: 2025-01-18 | End: 2025-03-30

## 2025-03-29 RX ORDER — CARVEDILOL 12.5 MG/1
25 TABLET ORAL 2 TIMES DAILY WITH MEALS
Status: DISCONTINUED | OUTPATIENT
Start: 2025-03-30 | End: 2025-03-30 | Stop reason: HOSPADM

## 2025-03-29 RX ORDER — ONDANSETRON 4 MG/1
TABLET, FILM COATED ORAL
COMMUNITY
Start: 2024-08-11

## 2025-03-29 RX ORDER — NAPROXEN SODIUM 220 MG/1
81 TABLET, FILM COATED ORAL DAILY
Status: DISCONTINUED | OUTPATIENT
Start: 2025-03-30 | End: 2025-03-30 | Stop reason: HOSPADM

## 2025-03-29 RX ORDER — DOXYCYCLINE HYCLATE 100 MG
100 TABLET ORAL EVERY 12 HOURS
Status: DISCONTINUED | OUTPATIENT
Start: 2025-03-29 | End: 2025-03-30 | Stop reason: HOSPADM

## 2025-03-29 RX ADMIN — AMLODIPINE BESYLATE 10 MG: 5 TABLET ORAL at 05:03

## 2025-03-29 RX ADMIN — HEPARIN SODIUM 12 UNITS/KG/HR: 10000 INJECTION, SOLUTION INTRAVENOUS at 07:03

## 2025-03-29 RX ADMIN — FUROSEMIDE 20 MG: 10 INJECTION, SOLUTION INTRAMUSCULAR; INTRAVENOUS at 04:03

## 2025-03-29 RX ADMIN — IPRATROPIUM BROMIDE AND ALBUTEROL SULFATE 3 ML: 2.5; .5 SOLUTION RESPIRATORY (INHALATION) at 04:03

## 2025-03-29 RX ADMIN — NITROGLYCERIN 0.4 MG: 0.4 TABLET SUBLINGUAL at 03:03

## 2025-03-29 RX ADMIN — CARVEDILOL 25 MG: 12.5 TABLET, FILM COATED ORAL at 05:03

## 2025-03-29 RX ADMIN — IPRATROPIUM BROMIDE AND ALBUTEROL SULFATE 3 ML: 2.5; .5 SOLUTION RESPIRATORY (INHALATION) at 09:03

## 2025-03-29 RX ADMIN — DEXAMETHASONE SODIUM PHOSPHATE 8 MG: 4 INJECTION, SOLUTION INTRA-ARTICULAR; INTRALESIONAL; INTRAMUSCULAR; INTRAVENOUS; SOFT TISSUE at 03:03

## 2025-03-29 RX ADMIN — IPRATROPIUM BROMIDE AND ALBUTEROL SULFATE 3 ML: 2.5; .5 SOLUTION RESPIRATORY (INHALATION) at 03:03

## 2025-03-29 RX ADMIN — ASPIRIN 162 MG: 81 TABLET, CHEWABLE ORAL at 03:03

## 2025-03-29 RX ADMIN — CLONIDINE HYDROCHLORIDE 0.1 MG: 0.1 TABLET ORAL at 05:03

## 2025-03-29 NOTE — ED NOTES
Patient assisted to room 3 via wheelchair.  Family stated that he progressively getting worse with his SOB in the last few hours.  Pt has heart hx and has a pacemaker/defibrillator and confirms that he has a hx of CHF.  Swelling to B/L legs noted +1 dependent edema.  Pt's blood pressure is elevated and states that he takes medication for his pressure and took it this am.  Pt frustrated with staff because he states he can't breath and wants O2.  Nurse stressed to pt that he needed to calm down and that it wasn't helping his SOB to get agitated.  Applied O2 at 3L NC.  Baseline 89% on room air prior to application. Pt had soiled himself as well.  Pt was cleaned and placed in gown.

## 2025-03-29 NOTE — ED NOTES
Spoke with [facility] who request the pacemaker / defibrillator to be interrogated. Nurse stated she would do her best to attempt this.

## 2025-03-29 NOTE — TELEMEDICINE CONSULT
[facility] - Emergency Dept    Telecardiology  Consult Note    Patient Name: Justyn Perez  MRN: [id number]  Admission Date: 3/29/2025  Hospital Length of Stay: 0 days  Code Status: Prior   Attending Provider: Goyo Oreilly MD   Consulting Provider: Walt Andino Jr, MD  Primary Care Physician: Rodney Jalloh NP  Principal Problem:<principal problem not specified>    Patient information was obtained from patient and ER records.     Subjective:     Chief Complaint/Reason for Consult: PAZ    HPI: 78 yo M history of CAD with multiple stents, CKD, former smoker, presents with SOB x 12 hours. No chest pain. Happened with exertion and has been worsening. Presented to ED for further work-up.    Past Medical History:   Diagnosis Date    Aortic stenosis     Carpal tunnel syndrome     CKD (chronic kidney disease)     Coronary artery disease     Disorder of kidney and ureter     Dyslipidemia     Hyperlipidemia     Hypertension     IGT (impaired glucose tolerance)     PVD (peripheral vascular disease)      Past Surgical History:   Procedure Laterality Date    CARDIAC VALVE REPLACEMENT      CAROTID STENT      COLONOSCOPY N/A 5/14/2024    Procedure: COLONOSCOPY;  Surgeon: Jose C Bassett MD;  Location: [facility];  Service: Gastroenterology;  Laterality: N/A;    femoral-popliteal artery bypass      Renal artery angiogram      TAVR-TF  04/29/2020    TONSILLECTOMY       Review of patient's allergies indicates:   Allergen Reactions    Penicillins Rash     No current facility-administered medications on file prior to encounter.     Current Outpatient Medications on File Prior to Encounter   Medication Sig    ENTRESTO 49-51 mg per tablet Take 1 tablet by mouth 2 (two) times daily.    furosemide (LASIX) 40 MG tablet Take 40 mg by mouth daily as needed.    nitroGLYCERIN (NITROSTAT) 0.4 MG SL tablet Place under the tongue.    ondansetron (ZOFRAN) 4 MG tablet Take by mouth.    pantoprazole (PROTONIX) 40 MG  tablet Take 40 mg by mouth every morning.    amLODIPine (NORVASC) 10 MG tablet Take 10 mg by mouth every evening.    ascorbic acid, vitamin C, (VITAMIN C) 250 MG tablet Take 250 mg by mouth once daily.    aspirin 81 MG Chew Take 81 mg by mouth once daily.    atorvastatin (LIPITOR) 80 MG tablet Take 80 mg by mouth every evening.    carvediloL (COREG) 25 MG tablet Take 1 tablet (25 mg total) by mouth 2 (two) times daily with meals.    cloNIDine (CATAPRES) 0.1 MG tablet Take 1 tablet (0.1 mg total) by mouth 2 (two) times daily.    clopidogreL (PLAVIX) 75 mg tablet Take 75 mg by mouth once daily at 6am.    ezetimibe (ZETIA) 10 mg tablet Take 10 mg by mouth every evening.    glucosamine/chondr sy A sod (OSTEO BI-FLEX ORAL) Take 1 tablet by mouth once daily at 6am.    hydrALAZINE (APRESOLINE) 100 MG tablet Take 1 tablet (100 mg total) by mouth every 8 (eight) hours.    multivitamin with folic acid 400 mcg Tab Take 1 tablet by mouth once daily.    sertraline (ZOLOFT) 50 MG tablet Take 1 tablet (50 mg total) by mouth once daily.    traZODone (DESYREL) 100 MG tablet Take 1 tablet (100 mg total) by mouth every evening.     Family History       Problem Relation (Age of Onset)    No Known Problems Sister, Brother, Daughter, Son    Parkinsonism Mother    Stroke Father          Tobacco Use    Smoking status: Former     Types: Cigarettes     Start date:      Quit date: 1957     Years since quittin.2    Smokeless tobacco: Never   Substance and Sexual Activity    Alcohol use: Not Currently    Drug use: Never    Sexual activity: Not Currently       Review of Systems   Constitutional: Negative.    HENT: Negative.     Eyes: Negative.    Respiratory: Negative.     Cardiovascular: Negative.    Gastrointestinal: Negative.    Endocrine: Negative.    Genitourinary: Negative.    Musculoskeletal: Negative.    Allergic/Immunologic: Negative.    Neurological: Negative.    Hematological: Negative.    Psychiatric/Behavioral: Negative.    "    Objective:     Vital Signs (Most Recent):  Temp: 97.8 °F (36.6 °C) (03/29/25 1459)  Pulse: 77 (03/29/25 1829)  Resp: (!) 24 (03/29/25 1829)  BP: (!) 176/90 (03/29/25 1829)  SpO2: 96 % (03/29/25 1829) Vital Signs (24h Range):  Temp:  [97.8 °F (36.6 °C)] 97.8 °F (36.6 °C)  Pulse:  [71-89] 77  Resp:  [18-24] 24  SpO2:  [88 %-99 %] 96 %  BP: (176-210)/() 176/90   Weight: 71.3 kg (157 lb 3 oz)  Body mass index is 25.37 kg/m².  SpO2: 96 %       Intake/Output Summary (Last 24 hours) at 3/29/2025 1846  Last data filed at 3/29/2025 1815  Gross per 24 hour   Intake --   Output 800 ml   Net -800 ml     Lines/Drains/Airways       Peripheral Intravenous Line  Duration                  Peripheral IV - Single Lumen 03/29/25 1509 20 G Left;Posterior Forearm <1 day                  Significant Labs:   Chemistries:   Recent Labs   Lab 03/29/25  1506 03/29/25  1702     --    K 4.1  --    *  --    CO2 23  --    BUN 26.0*  --    CREATININE 1.23  --    CALCIUM 9.4  --    BILITOT 0.5  --    ALKPHOS 120  --    ALT 18  --    AST 23  --    GLUCOSE 138*  --    TROPONINI 0.039 0.060*        CBC/Anemia Labs: Coags:    Recent Labs   Lab 03/29/25  1506   WBC 9.31   HGB 11.6*   HCT 36.7*      MCV 88.4   RDW 15.9    No results for input(s): "PT", "INR", "APTT" in the last 168 hours.     Significant Imaging:  Imaging Results              X-Ray Chest AP Portable (Final result)  Result time 03/29/25 15:14:36      Final result by Haritha Whiting MD (03/29/25 15:14:36)                   Impression:      Enlarged cardiac silhouette.  Interstitial opacities may be related to interstitial edema or atypical infection.  Correlate with BNP.      Electronically signed by: Haritha Whiting  Date:    03/29/2025  Time:    15:14               Narrative:    EXAMINATION:  XR CHEST AP PORTABLE    CLINICAL HISTORY:  Shortness of breath    TECHNIQUE:  Single frontal view of the chest was " performed.    COMPARISON:  05/13/2024    FINDINGS:  LINES AND TUBES: Dual lead cardiac pacer device is in place via left subclavian approach with leads overlying the right atrium and right ventricle.    MEDIASTINUM AND TORRES: Cardiac silhouette is enlarged. There is a TAVR prosthesis.  Aortic atherosclerosis.  Is a superior mediastinal vascular stent.    LUNGS: Patchy interstitial opacities within the lungs.    PLEURA:No pleural effusion. No pneumothorax.    BONES: No acute osseous abnormality.                                    EKG: sinus rhythm, LVH, atrial paced rhythm      Physical Exam  Constitutional:       General: He is not in acute distress.     Appearance: Normal appearance. He is not ill-appearing.   HENT:      Head: Normocephalic and atraumatic.      Mouth/Throat:      Mouth: Mucous membranes are dry.      Pharynx: Oropharynx is clear.   Eyes:      Extraocular Movements: Extraocular movements intact.      Conjunctiva/sclera: Conjunctivae normal.      Pupils: Pupils are equal, round, and reactive to light.   Cardiovascular:      Rate and Rhythm: Normal rate.      Pulses: Normal pulses.      Heart sounds: Normal heart sounds. No murmur heard.     No friction rub. No gallop.   Musculoskeletal:         General: Normal range of motion.      Cervical back: Normal range of motion and neck supple. No rigidity.   Skin:     General: Skin is warm and dry.      Capillary Refill: Capillary refill takes less than 2 seconds.   Neurological:      General: No focal deficit present.      Mental Status: He is alert and oriented to person, place, and time. Mental status is at baseline.   Psychiatric:         Mood and Affect: Mood normal.         Behavior: Behavior normal.         Thought Content: Thought content normal.         Judgment: Judgment normal.       Home Medications:   Medications Ordered Prior to Encounter[1]  Current Schedule Inpatient Medications:    Continuous Infusions:    Assessment:     80 yo M history of  CAD with multiple stents, CKD, former smoker, presents with SOB x 12 hours. Troponins mildly elevated      Plan:     - trend troponins x 6 hours overnight  - start heparin gtt with ACS protocol  - continue Entresto 49/51 mg po bid, Lasix 40 mg po daily, Coreg 25 mg po bid, aspirin 81 mg po daily, Plavix 75 mg po daily, Lipitor 80 mg po daily, Zetia 10 mg po daily, amlodipine 10 mg po daily  - obtain TTE  - if troponin remains flat overnight, and patient remains symptom free, would be reasonable to send out for outpatient stress. If troponins rise, or symptoms recur, reasonable to arrange for cardiac catheterization at [facility]    Thank you for your consult.     Walt Andino MD  Interventional Cardiology/Structural Heart Disease  [facility]         [1]   No current facility-administered medications on file prior to encounter.     Current Outpatient Medications on File Prior to Encounter   Medication Sig Dispense Refill    ENTRESTO 49-51 mg per tablet Take 1 tablet by mouth 2 (two) times daily.      furosemide (LASIX) 40 MG tablet Take 40 mg by mouth daily as needed.      nitroGLYCERIN (NITROSTAT) 0.4 MG SL tablet Place under the tongue.      ondansetron (ZOFRAN) 4 MG tablet Take by mouth.      pantoprazole (PROTONIX) 40 MG tablet Take 40 mg by mouth every morning.      amLODIPine (NORVASC) 10 MG tablet Take 10 mg by mouth every evening.      ascorbic acid, vitamin C, (VITAMIN C) 250 MG tablet Take 250 mg by mouth once daily.      aspirin 81 MG Chew Take 81 mg by mouth once daily.      atorvastatin (LIPITOR) 80 MG tablet Take 80 mg by mouth every evening.      carvediloL (COREG) 25 MG tablet Take 1 tablet (25 mg total) by mouth 2 (two) times daily with meals. 180 tablet 3    cloNIDine (CATAPRES) 0.1 MG tablet Take 1 tablet (0.1 mg total) by mouth 2 (two) times daily. 60 tablet 11    clopidogreL (PLAVIX) 75 mg tablet Take 75 mg by mouth once daily at 6am.      ezetimibe (ZETIA) 10 mg tablet  Take 10 mg by mouth every evening.      glucosamine/chondr sy A sod (OSTEO BI-FLEX ORAL) Take 1 tablet by mouth once daily at 6am.      hydrALAZINE (APRESOLINE) 100 MG tablet Take 1 tablet (100 mg total) by mouth every 8 (eight) hours. 90 tablet 11    multivitamin with folic acid 400 mcg Tab Take 1 tablet by mouth once daily.      sertraline (ZOLOFT) 50 MG tablet Take 1 tablet (50 mg total) by mouth once daily. 60 tablet 2    traZODone (DESYREL) 100 MG tablet Take 1 tablet (100 mg total) by mouth every evening. 30 tablet 3

## 2025-03-29 NOTE — ED PROVIDER NOTES
Encounter Date: 3/29/2025       History     Chief Complaint   Patient presents with    Shortness of Breath     SOB started this am at 9 while sitting in chair. Not getting better so he came in, denies any other symptoms.      Patient is an 79-year-old white male history of CAD, CKD, hypertension, hyperlipidemia who presented to the ER today due to shortness of breath for the last 8-12 hours.  He states he has been progressively worsening.  States he has no chest pain.  Does endorse chronic lower extremity edema bilaterally.  States his exertion worsens his dyspnea.  Denies any fevers, chest pain, abdominal pain, dysuria hematuria.      Review of patient's allergies indicates:   Allergen Reactions    Penicillins Rash     Past Medical History:   Diagnosis Date    Aortic stenosis     Carpal tunnel syndrome     CKD (chronic kidney disease)     Coronary artery disease     Disorder of kidney and ureter     Dyslipidemia     Hyperlipidemia     Hypertension     IGT (impaired glucose tolerance)     PVD (peripheral vascular disease)      Past Surgical History:   Procedure Laterality Date    CARDIAC VALVE REPLACEMENT      CAROTID STENT      COLONOSCOPY N/A 5/14/2024    Procedure: COLONOSCOPY;  Surgeon: Jose C Bassett MD;  Location: [facility];  Service: Gastroenterology;  Laterality: N/A;    femoral-popliteal artery bypass      Renal artery angiogram      TAVR-TF  04/29/2020    TONSILLECTOMY       Family History   Problem Relation Name Age of Onset    Parkinsonism Mother      Stroke Father      No Known Problems Sister      No Known Problems Brother      No Known Problems Daughter      No Known Problems Son       Social History[1]  Review of Systems   Constitutional:  Negative for chills, fatigue and fever.   HENT:  Negative for congestion, sore throat and trouble swallowing.    Eyes:  Negative for pain and visual disturbance.   Respiratory:  Positive for shortness of breath. Negative for cough and wheezing.     Cardiovascular:  Negative for chest pain and palpitations.   Gastrointestinal:  Negative for abdominal pain, blood in stool, constipation, diarrhea, nausea and vomiting.   Genitourinary:  Negative for dysuria and hematuria.   Musculoskeletal:  Negative for back pain and myalgias.   Skin:  Negative for rash and wound.   Neurological:  Negative for seizures, syncope and headaches.   Psychiatric/Behavioral:  Negative for confusion. The patient is not nervous/anxious.        Physical Exam     Initial Vitals [03/29/25 1459]   BP Pulse Resp Temp SpO2   (!) 210/108 89 (!) 24 97.8 °F (36.6 °C) (!) 88 %      MAP       --         Physical Exam    Nursing note and vitals reviewed.  Constitutional: He appears well-developed and well-nourished. No distress.   HENT:   Head: Normocephalic and atraumatic.   Eyes: Conjunctivae and EOM are normal. Right eye exhibits no discharge. Left eye exhibits no discharge. No scleral icterus.   Neck: No tracheal deviation present.   Normal range of motion.  Cardiovascular:  Normal rate, regular rhythm and normal heart sounds.     Exam reveals no gallop and no friction rub.       No murmur heard.  Pulmonary/Chest: He is in respiratory distress. He has wheezes. He has no rhonchi. He has no rales.   Pacemaker noted to left upper chest.  Bilateral breath sounds consistent with end expiratory wheezing, diminished breath sounds but no auscultation findings consistent with rales.   Abdominal: Abdomen is soft. He exhibits no distension. There is no abdominal tenderness. There is no rebound and no guarding.   Musculoskeletal:         General: No edema. Normal range of motion.      Cervical back: Normal range of motion.     Neurological: He is alert.   Skin: Skin is warm and dry. No rash and no abscess noted. No erythema. No pallor.   Psychiatric: His behavior is normal. Judgment normal.         ED Course   Critical Care    Date/Time: 3/29/2025 6:38 PM    Performed by: Goyo Oreilly MD  Authorized by:  Goyo Oreilly MD  Direct patient critical care time: 10 minutes  Additional history critical care time: 10 minutes  Ordering / reviewing critical care time: 10 minutes  Documentation critical care time: 10 minutes  Consulting other physicians critical care time: 10 minutes  Total critical care time (exclusive of procedural time) : 50 minutes  Critical care was necessary to treat or prevent imminent or life-threatening deterioration of the following conditions: respiratory failure and cardiac failure.  Critical care was time spent personally by me on the following activities: blood draw for specimens, development of treatment plan with patient or surrogate, discussions with consultants, evaluation of patient's response to treatment, examination of patient, obtaining history from patient or surrogate, ordering and performing treatments and interventions, ordering and review of laboratory studies, ordering and review of radiographic studies, pulse oximetry, re-evaluation of patient's condition and review of old charts.        Labs Reviewed   COMPREHENSIVE METABOLIC PANEL - Abnormal       Result Value    Sodium 142      Potassium 4.1      Chloride 111 (*)     CO2 23      Glucose 138 (*)     Blood Urea Nitrogen 26.0 (*)     Creatinine 1.23      Calcium 9.4      Protein Total 7.8 (*)     Albumin 3.9      Globulin 3.9 (*)     Albumin/Globulin Ratio 1.0 (*)     Bilirubin Total 0.5            ALT 18      AST 23      eGFR 59      Anion Gap 8.0      BUN/Creatinine Ratio 21     CBC WITH DIFFERENTIAL - Abnormal    WBC 9.31      RBC 4.15 (*)     Hgb 11.6 (*)     Hct 36.7 (*)     MCV 88.4      MCH 28.0      MCHC 31.6 (*)     RDW 15.9      Platelet 285      MPV 10.5 (*)     Neut % 70.0      Lymph % 19.3      Mono % 5.9      Eos % 4.2      Basophil % 0.5      Imm Grans % 0.1      Neut # 6.51      Lymph # 1.80      Mono # 0.55      Eos # 0.39      Baso # 0.05      Imm Gran # 0.01      NRBC% 0.0     URINALYSIS, REFLEX TO URINE  CULTURE - Abnormal    Color, UA Yellow      Appearance, UA Clear      Specific Gravity, UA 1.020      pH, UA 6.5      Protein, UA 2+ (*)     Glucose, UA Negative      Ketones, UA Negative      Blood, UA Negative      Bilirubin, UA Negative      Urobilinogen, UA 0.2      Nitrites, UA Negative      Leukocyte Esterase, UA Negative     B-TYPE NATRIURETIC PEPTIDE - Abnormal    Natriuretic Peptide 2,861.4 (*)    URINALYSIS, MICROSCOPIC - Abnormal    Bacteria, UA Rare      RBC, UA None Seen      WBC, UA 0-2      Squamous Epithelial Cells, UA Few (*)    TROPONIN I - Abnormal    Troponin-I 0.060 (*)    TROPONIN I - Normal    Troponin-I 0.039     COVID/RSV/FLU A&B PCR - Normal    Influenza A PCR Not Detected      Influenza B PCR Not Detected      Respiratory Syncytial Virus PCR Not Detected      SARS-CoV-2 PCR Not Detected      Narrative:     The Xpert Xpress SARS-CoV-2/FLU/RSV plus is a rapid, multiplexed real-time PCR test intended for the simultaneous qualitative detection and differentiation of SARS-CoV-2, Influenza A, Influenza B, and respiratory syncytial virus (RSV) viral RNA in either nasopharyngeal swab or nasal swab specimens.         CBC W/ AUTO DIFFERENTIAL    Narrative:     The following orders were created for panel order CBC Auto Differential.  Procedure                               Abnormality         Status                     ---------                               -----------         ------                     CBC with Differential[[id number]]       Abnormal            Final result                 Please view results for these tests on the individual orders.     EKG Readings: (Independently Interpreted)   Initial Reading: No STEMI. Previous EKG Date: 5/13/2024. Rhythm: Normal Sinus Rhythm. Heart Rate: 94. ST Segments: Normal ST Segments. Q Waves: V4, V5 and V6.   Compared to EKG from 05/13/2024 where T-wave inversions were also seen in the lateral leads.       Imaging Results              X-Ray Chest AP  Portable (Final result)  Result time 03/29/25 15:14:36      Final result by Haritha Whiting MD (03/29/25 15:14:36)                   Impression:      Enlarged cardiac silhouette.  Interstitial opacities may be related to interstitial edema or atypical infection.  Correlate with BNP.      Electronically signed by: Haritha Whiting  Date:    03/29/2025  Time:    15:14               Narrative:    EXAMINATION:  XR CHEST AP PORTABLE    CLINICAL HISTORY:  Shortness of breath    TECHNIQUE:  Single frontal view of the chest was performed.    COMPARISON:  05/13/2024    FINDINGS:  LINES AND TUBES: Dual lead cardiac pacer device is in place via left subclavian approach with leads overlying the right atrium and right ventricle.    MEDIASTINUM AND TORRES: Cardiac silhouette is enlarged. There is a TAVR prosthesis.  Aortic atherosclerosis.  Is a superior mediastinal vascular stent.    LUNGS: Patchy interstitial opacities within the lungs.    PLEURA:No pleural effusion. No pneumothorax.    BONES: No acute osseous abnormality.                                       Medications   dexAMETHasone injection 8 mg (8 mg Intravenous Given 3/29/25 1510)   aspirin chewable tablet 162 mg (162 mg Oral Given 3/29/25 1510)   albuterol-ipratropium 2.5 mg-0.5 mg/3 mL nebulizer solution 3 mL (3 mLs Nebulization Given 3/29/25 1516)   nitroGLYCERIN SL tablet 0.4 mg (0.4 mg Sublingual Given 3/29/25 1515)   albuterol-ipratropium 2.5 mg-0.5 mg/3 mL nebulizer solution 3 mL (3 mLs Nebulization Given 3/29/25 1602)   furosemide injection 20 mg (20 mg Intravenous Given 3/29/25 1602)   cloNIDine tablet 0.1 mg (0.1 mg Oral Given 3/29/25 1757)   amLODIPine tablet 10 mg (10 mg Oral Given 3/29/25 1757)   carvediloL tablet 25 mg (25 mg Oral Given 3/29/25 1757)     Medical Decision Making  Differentials:  CHF exacerbation, COPD flare, ACS, PE, pneumonia   History is provided by the patient   79-year-old male presents to the ER today for shortness of breath.   Clinically he appears to be euvolemic to hypervolemic.  Trace edema noted no rales appreciated.  No rales however patient is extremely hypertensive but flash pulmonary edema does not leads the differential.  Decadron and DuoNebs seemed to significantly improve patient's symptoms and he was able to be weaned off supplemental oxygen.  Denies any other symptoms of ACS.  EKG shows T-wave inversions in the lateral leads however patient has these findings at baseline.  Chest x-ray shows no overt acute process.  Initial troponin negative.  Basic labs showed no evidence of leukocytosis, electrolyte disturbance or acute kidney injury.  Second troponin did go up and aspirin 162 was given due to his history of CKD.  I suspect mild CHF exacerbation with hypoxemia causes demand ischemia leading to the elevated troponinemia.  Cardiology consulted for further recommendations.  Cardiology recommended trending troponins and continued to treat the underlying cause.  Patient verbalizing near complete resolution of the symptoms.  Discussed with Dr. Bullard who accepted for further management.    Amount and/or Complexity of Data Reviewed  Labs: ordered. Decision-making details documented in ED Course.  Radiology: ordered. Decision-making details documented in ED Course.  ECG/medicine tests: ordered and independent interpretation performed. Decision-making details documented in ED Course.    Risk  OTC drugs.  Prescription drug management.  Decision regarding hospitalization.                                      Clinical Impression:  Final diagnoses:  [R06.02] SOB (shortness of breath)  [J96.01] Acute respiratory failure with hypoxia (Primary)  [J44.1] COPD exacerbation  [I21.4] NSTEMI (non-ST elevated myocardial infarction)          ED Disposition Condition    Observation Stable                    [1]   Social History  Tobacco Use    Smoking status: Former     Types: Cigarettes     Start date: 2005     Quit date: 1957     Years since  quittin.2    Smokeless tobacco: Never   Substance Use Topics    Alcohol use: Not Currently    Drug use: Never        Goyo Oreilly MD  [phone]

## 2025-03-30 VITALS
HEART RATE: 77 BPM | DIASTOLIC BLOOD PRESSURE: 72 MMHG | TEMPERATURE: 98 F | RESPIRATION RATE: 22 BRPM | BODY MASS INDEX: 24.59 KG/M2 | WEIGHT: 153 LBS | OXYGEN SATURATION: 95 % | HEIGHT: 66 IN | SYSTOLIC BLOOD PRESSURE: 161 MMHG

## 2025-03-30 PROBLEM — I21.4 NSTEMI (NON-ST ELEVATED MYOCARDIAL INFARCTION): Status: RESOLVED | Noted: 2025-03-29 | Resolved: 2025-03-30

## 2025-03-30 PROBLEM — I50.33 ACUTE ON CHRONIC DIASTOLIC CHF (CONGESTIVE HEART FAILURE): Status: RESOLVED | Noted: 2025-03-29 | Resolved: 2025-03-30

## 2025-03-30 PROBLEM — J96.01 ACUTE RESPIRATORY FAILURE WITH HYPOXIA: Status: RESOLVED | Noted: 2025-03-29 | Resolved: 2025-03-30

## 2025-03-30 PROBLEM — J44.1 COPD EXACERBATION: Status: RESOLVED | Noted: 2025-03-29 | Resolved: 2025-03-30

## 2025-03-30 LAB
ANION GAP SERPL CALC-SCNC: 9 MEQ/L
APTT PPP: 48 SECONDS (ref 24.5–32.8)
APTT PPP: 98.6 SECONDS
APTT PPP: >139 SECONDS
BASOPHILS # BLD AUTO: 0.01 X10(3)/MCL
BASOPHILS NFR BLD AUTO: 0.2 %
BNP BLD-MCNC: 3263.9 PG/ML
BUN SERPL-MCNC: 28 MG/DL (ref 8.4–25.7)
CALCIUM SERPL-MCNC: 8.8 MG/DL (ref 8.8–10)
CHLORIDE SERPL-SCNC: 109 MMOL/L (ref 98–107)
CO2 SERPL-SCNC: 23 MMOL/L (ref 23–31)
CREAT SERPL-MCNC: 1.28 MG/DL (ref 0.72–1.25)
CREAT/UREA NIT SERPL: 22
EOSINOPHIL # BLD AUTO: 0 X10(3)/MCL (ref 0–0.9)
EOSINOPHIL NFR BLD AUTO: 0 %
ERYTHROCYTE [DISTWIDTH] IN BLOOD BY AUTOMATED COUNT: 16 % (ref 11.5–17)
GFR SERPLBLD CREATININE-BSD FMLA CKD-EPI: 57 ML/MIN/1.73/M2
GLUCOSE SERPL-MCNC: 180 MG/DL (ref 82–115)
HCT VFR BLD AUTO: 30.9 % (ref 42–52)
HGB BLD-MCNC: 9.9 G/DL (ref 14–18)
IMM GRANULOCYTES # BLD AUTO: 0.02 X10(3)/MCL (ref 0–0.04)
IMM GRANULOCYTES NFR BLD AUTO: 0.4 %
LYMPHOCYTES # BLD AUTO: 0.52 X10(3)/MCL (ref 0.6–4.6)
LYMPHOCYTES NFR BLD AUTO: 9.1 %
MCH RBC QN AUTO: 28.4 PG (ref 27–31)
MCHC RBC AUTO-ENTMCNC: 32 G/DL (ref 33–36)
MCV RBC AUTO: 88.5 FL (ref 80–94)
MONOCYTES # BLD AUTO: 0.11 X10(3)/MCL (ref 0.1–1.3)
MONOCYTES NFR BLD AUTO: 1.9 %
NEUTROPHILS # BLD AUTO: 5.03 X10(3)/MCL (ref 2.1–9.2)
NEUTROPHILS NFR BLD AUTO: 88.4 %
NRBC BLD AUTO-RTO: 0 %
OHS QRS DURATION: 102 MS
OHS QRS DURATION: 94 MS
OHS QTC CALCULATION: 462 MS
OHS QTC CALCULATION: 491 MS
PLATELET # BLD AUTO: 225 X10(3)/MCL (ref 130–400)
PMV BLD AUTO: 10.9 FL (ref 7.4–10.4)
POTASSIUM SERPL-SCNC: 3.9 MMOL/L (ref 3.5–5.1)
RBC # BLD AUTO: 3.49 X10(6)/MCL (ref 4.7–6.1)
SODIUM SERPL-SCNC: 141 MMOL/L (ref 136–145)
TROPONIN I SERPL-MCNC: 0.08 NG/ML (ref 0–0.04)
TROPONIN I SERPL-MCNC: 0.12 NG/ML (ref 0–0.04)
WBC # BLD AUTO: 5.69 X10(3)/MCL (ref 4.5–11.5)

## 2025-03-30 PROCEDURE — 25000003 PHARM REV CODE 250: Performed by: INTERNAL MEDICINE

## 2025-03-30 PROCEDURE — 94761 N-INVAS EAR/PLS OXIMETRY MLT: CPT

## 2025-03-30 PROCEDURE — 93005 ELECTROCARDIOGRAM TRACING: CPT

## 2025-03-30 PROCEDURE — 85730 THROMBOPLASTIN TIME PARTIAL: CPT | Mod: 91 | Performed by: INTERNAL MEDICINE

## 2025-03-30 PROCEDURE — 84484 ASSAY OF TROPONIN QUANT: CPT | Performed by: STUDENT IN AN ORGANIZED HEALTH CARE EDUCATION/TRAINING PROGRAM

## 2025-03-30 PROCEDURE — 96375 TX/PRO/DX INJ NEW DRUG ADDON: CPT

## 2025-03-30 PROCEDURE — 96366 THER/PROPH/DIAG IV INF ADDON: CPT

## 2025-03-30 PROCEDURE — 80048 BASIC METABOLIC PNL TOTAL CA: CPT | Performed by: STUDENT IN AN ORGANIZED HEALTH CARE EDUCATION/TRAINING PROGRAM

## 2025-03-30 PROCEDURE — 25000003 PHARM REV CODE 250: Performed by: STUDENT IN AN ORGANIZED HEALTH CARE EDUCATION/TRAINING PROGRAM

## 2025-03-30 PROCEDURE — 36415 COLL VENOUS BLD VENIPUNCTURE: CPT | Performed by: STUDENT IN AN ORGANIZED HEALTH CARE EDUCATION/TRAINING PROGRAM

## 2025-03-30 PROCEDURE — 63600175 PHARM REV CODE 636 W HCPCS: Performed by: INTERNAL MEDICINE

## 2025-03-30 PROCEDURE — 96376 TX/PRO/DX INJ SAME DRUG ADON: CPT

## 2025-03-30 PROCEDURE — 25000242 PHARM REV CODE 250 ALT 637 W/ HCPCS: Performed by: STUDENT IN AN ORGANIZED HEALTH CARE EDUCATION/TRAINING PROGRAM

## 2025-03-30 PROCEDURE — G0378 HOSPITAL OBSERVATION PER HR: HCPCS

## 2025-03-30 PROCEDURE — 36415 COLL VENOUS BLD VENIPUNCTURE: CPT | Mod: 59 | Performed by: INTERNAL MEDICINE

## 2025-03-30 PROCEDURE — 85730 THROMBOPLASTIN TIME PARTIAL: CPT | Performed by: STUDENT IN AN ORGANIZED HEALTH CARE EDUCATION/TRAINING PROGRAM

## 2025-03-30 PROCEDURE — 85025 COMPLETE CBC W/AUTO DIFF WBC: CPT | Performed by: INTERNAL MEDICINE

## 2025-03-30 PROCEDURE — 83880 ASSAY OF NATRIURETIC PEPTIDE: CPT | Performed by: INTERNAL MEDICINE

## 2025-03-30 PROCEDURE — 84484 ASSAY OF TROPONIN QUANT: CPT | Mod: 91 | Performed by: INTERNAL MEDICINE

## 2025-03-30 PROCEDURE — 94640 AIRWAY INHALATION TREATMENT: CPT | Mod: XB

## 2025-03-30 PROCEDURE — 63600175 PHARM REV CODE 636 W HCPCS: Performed by: STUDENT IN AN ORGANIZED HEALTH CARE EDUCATION/TRAINING PROGRAM

## 2025-03-30 RX ORDER — CLONIDINE HYDROCHLORIDE 0.1 MG/1
0.1 TABLET ORAL EVERY 12 HOURS PRN
Status: DISCONTINUED | OUTPATIENT
Start: 2025-03-30 | End: 2025-03-30 | Stop reason: HOSPADM

## 2025-03-30 RX ORDER — FUROSEMIDE 10 MG/ML
20 INJECTION INTRAMUSCULAR; INTRAVENOUS ONCE
Status: COMPLETED | OUTPATIENT
Start: 2025-03-30 | End: 2025-03-30

## 2025-03-30 RX ORDER — CLONIDINE HYDROCHLORIDE 0.1 MG/1
0.1 TABLET ORAL ONCE
Status: COMPLETED | OUTPATIENT
Start: 2025-03-30 | End: 2025-03-30

## 2025-03-30 RX ORDER — FUROSEMIDE 40 MG/1
40 TABLET ORAL DAILY
Qty: 30 TABLET | Refills: 0 | Status: SHIPPED | OUTPATIENT
Start: 2025-03-30 | End: 2025-04-04

## 2025-03-30 RX ADMIN — SERTRALINE HYDROCHLORIDE 50 MG: 50 TABLET ORAL at 10:03

## 2025-03-30 RX ADMIN — SACUBITRIL AND VALSARTAN 2 TABLET: 24; 26 TABLET, FILM COATED ORAL at 12:03

## 2025-03-30 RX ADMIN — DOXYCYCLINE HYCLATE 100 MG: 100 TABLET, COATED ORAL at 02:03

## 2025-03-30 RX ADMIN — ATORVASTATIN CALCIUM 80 MG: 40 TABLET, FILM COATED ORAL at 12:03

## 2025-03-30 RX ADMIN — IPRATROPIUM BROMIDE AND ALBUTEROL SULFATE 3 ML: 2.5; .5 SOLUTION RESPIRATORY (INHALATION) at 06:03

## 2025-03-30 RX ADMIN — DOXYCYCLINE HYCLATE 100 MG: 100 TABLET, COATED ORAL at 10:03

## 2025-03-30 RX ADMIN — PANTOPRAZOLE SODIUM 40 MG: 40 TABLET, DELAYED RELEASE ORAL at 10:03

## 2025-03-30 RX ADMIN — CEFTRIAXONE SODIUM 1 G: 1 INJECTION, POWDER, FOR SOLUTION INTRAMUSCULAR; INTRAVENOUS at 12:03

## 2025-03-30 RX ADMIN — CLOPIDOGREL BISULFATE 75 MG: 75 TABLET ORAL at 10:03

## 2025-03-30 RX ADMIN — EZETIMIBE 10 MG: 10 TABLET ORAL at 12:03

## 2025-03-30 RX ADMIN — FUROSEMIDE 20 MG: 10 INJECTION, SOLUTION INTRAMUSCULAR; INTRAVENOUS at 10:03

## 2025-03-30 RX ADMIN — PREDNISONE 40 MG: 20 TABLET ORAL at 10:03

## 2025-03-30 RX ADMIN — TRAZODONE HYDROCHLORIDE 100 MG: 50 TABLET ORAL at 12:03

## 2025-03-30 RX ADMIN — HEPARIN SODIUM 15 UNITS/KG/HR: 10000 INJECTION, SOLUTION INTRAVENOUS at 05:03

## 2025-03-30 RX ADMIN — SACUBITRIL AND VALSARTAN 2 TABLET: 24; 26 TABLET, FILM COATED ORAL at 10:03

## 2025-03-30 RX ADMIN — DIPHENHYDRAMINE HYDROCHLORIDE 50 MG: 50 INJECTION INTRAMUSCULAR; INTRAVENOUS at 12:03

## 2025-03-30 RX ADMIN — CLONIDINE HYDROCHLORIDE 0.1 MG: 0.1 TABLET ORAL at 04:03

## 2025-03-30 RX ADMIN — ASPIRIN 81 MG: 81 TABLET, CHEWABLE ORAL at 10:03

## 2025-03-30 NOTE — ASSESSMENT & PLAN NOTE
Recent Labs     03/29/25  1506 03/30/25  0233   BNP 2,861.4* 3,263.9*     Latest ECHO  No results found for this or any previous visit.    Current Heart Failure Medications  , 2 times daily, Oral  carvediloL tablet 25 mg, 2 times daily with meals, Oral  sacubitriL-valsartan 24-26 mg per tablet 2 tablet, 2 times daily, Oral  furosemide tablet 40 mg, Daily, Oral  furosemide (LASIX) tablet, Daily, Oral    Plan  - Monitor strict I&Os and daily weights.    - Place on telemetry  - Low sodium diet  Echo ordrered per [facility].

## 2025-03-30 NOTE — NURSING
Called [facility] and spoke with Renee. Faxed records. New EKG obtained. Cardiology monitor at nurse's station.

## 2025-03-30 NOTE — PLAN OF CARE
Problem: Adult Inpatient Plan of Care  Goal: Plan of Care Review  Outcome: Progressing  Goal: Patient-Specific Goal (Individualized)  Outcome: Progressing  Goal: Absence of Hospital-Acquired Illness or Injury  Outcome: Progressing  Goal: Optimal Comfort and Wellbeing  Outcome: Progressing  Goal: Readiness for Transition of Care  Outcome: Progressing     Problem: Fatigue  Goal: Improved Activity Tolerance  Outcome: Progressing     Problem: Gas Exchange Impaired  Goal: Optimal Gas Exchange  Outcome: Progressing     Problem: Fluid Volume Excess  Goal: Fluid Balance  Outcome: Progressing     Problem: Heart Failure  Goal: Optimal Coping  Outcome: Progressing  Goal: Optimal Cardiac Output  Outcome: Progressing  Goal: Stable Heart Rate and Rhythm  Outcome: Progressing  Goal: Optimal Functional Ability  Outcome: Progressing  Goal: Fluid and Electrolyte Balance  Outcome: Progressing  Goal: Improved Oral Intake  Outcome: Progressing  Goal: Effective Oxygenation and Ventilation  Outcome: Progressing  Goal: Effective Breathing Pattern During Sleep  Outcome: Progressing     Problem: Hypertension Acute  Goal: Blood Pressure Within Desired Range  Outcome: Progressing     Problem: Pneumonia  Goal: Fluid Balance  Outcome: Progressing  Goal: Resolution of Infection Signs and Symptoms  Outcome: Progressing  Goal: Effective Oxygenation and Ventilation  Outcome: Progressing     Problem: VTE (Venous Thromboembolism)  Goal: Tissue Perfusion  Outcome: Progressing  Goal: Right Ventricular Function  Outcome: Progressing

## 2025-03-30 NOTE — DISCHARGE INSTRUCTIONS
Pt to DC home in stable condition.    Make follow up appts with cardiology and primary care tomorrow.    Take all medications as prescribed.    Changed Lasix prescription to 40 mg daily x 5 days, then daily as needed for 2 lb or greater weight gain.    Weigh yourself every day at home in the morning.

## 2025-03-30 NOTE — HPI
"Examination was performed via telemedicine by myself, located at my home, with the assistance of the nurse, Aaliyah, located at [facility].     80 yo CM with PMH of CAD with multiple stents, CKD, former smoker, HLD, HTN, GERD, COPD presented to the ER with SOB. Vitals with tachypnea, tachycardia and SpO2 88%. Workup in the ER revealed first troponin normal, then 2nd increased to 0.06. EKG without ST elevation. Pt was without CP. BNP 2861. CXR with "Impression: Enlarged cardiac silhouette.  Interstitial opacities may be related to interstitial edema or atypical infection.  Correlate with BNP." Pt was given neb treatments and steroids at 1500 and felt significantly better after that, per ER physician. He was given lasix around 1600. Heparin drip was started per [facility] recs. Hospitalist consulted for admission.    Upon my exam, pt was lying comfortably in bed. He states that he feels 100% better. UOP was 1,150 ml so far in computer. Possibly with an additional 350ml since arriving at the floor. He has not taken the lasix at home today or yesterday. States he had to take it twice recently though for SOB. Pt denies any SOB with moving around. States it is back to baseline. Also denies ever having CP. Denies bladder issues. No N/V/D. States he had a large BM while in transport from the ER to the floor and was unsure why.  "

## 2025-03-30 NOTE — H&P
"  [facility] Medicine  History & Physical    Patient Name: Justyn Perez  MRN: [id number]  Patient Class: OP- Observation  Admission Date: 3/29/2025  Attending Physician: Shani Bullard DO   Primary Care Provider: Rodney Jalloh NP         Patient information was obtained from ER records.     Subjective:     Principal Problem:COPD exacerbation    Chief Complaint:   Chief Complaint   Patient presents with    Shortness of Breath     SOB started this am at 9 while sitting in chair. Not getting better so he came in, denies any other symptoms.         HPI: Examination was performed via telemedicine by myself, located at my home, with the assistance of the nurse, Aaliyah, located at [facility].     80 yo CM with PMH of CAD with multiple stents, CKD, former smoker, HLD, HTN, GERD, COPD presented to the ER with SOB. Vitals with tachypnea, tachycardia and SpO2 88%. Workup in the ER revealed first troponin normal, then 2nd increased to 0.06. EKG without ST elevation. Pt was without CP. BNP 2861. CXR with "Impression: Enlarged cardiac silhouette.  Interstitial opacities may be related to interstitial edema or atypical infection.  Correlate with BNP." Pt was given neb treatments and steroids at 1500 and felt significantly better after that, per ER physician. He was given lasix around 1600. Heparin drip was started per [facility] recs. Hospitalist consulted for admission.    Upon my exam, pt was lying comfortably in bed. He states that he feels 100% better. UOP was 1,150 ml so far in computer. Possibly with an additional 350ml since arriving at the floor. He has not taken the lasix at home today or yesterday. States he had to take it twice recently though for SOB. Pt denies any SOB with moving around. States it is back to baseline. Also denies ever having CP. Denies bladder issues. No N/V/D. States he had a large BM while in transport from the ER to the floor and was unsure " why.    Past Medical History:   Diagnosis Date    Aortic stenosis     Carpal tunnel syndrome     CKD (chronic kidney disease)     Coronary artery disease     Disorder of kidney and ureter     Dyslipidemia     Hyperlipidemia     Hypertension     IGT (impaired glucose tolerance)     PVD (peripheral vascular disease)        Past Surgical History:   Procedure Laterality Date    CARDIAC VALVE REPLACEMENT      CAROTID STENT      COLONOSCOPY N/A 5/14/2024    Procedure: COLONOSCOPY;  Surgeon: Jose C Bassett MD;  Location: [facility];  Service: Gastroenterology;  Laterality: N/A;    femoral-popliteal artery bypass      Renal artery angiogram      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     Current Outpatient Medications on File Prior to Encounter   Medication Sig    ENTRESTO 49-51 mg per tablet Take 1 tablet by mouth 2 (two) times daily.    furosemide (LASIX) 40 MG tablet Take 40 mg by mouth daily as needed.    nitroGLYCERIN (NITROSTAT) 0.4 MG SL tablet Place under the tongue.    ondansetron (ZOFRAN) 4 MG tablet Take by mouth.    pantoprazole (PROTONIX) 40 MG tablet Take 40 mg by mouth every morning.    amLODIPine (NORVASC) 10 MG tablet Take 10 mg by mouth every evening.    ascorbic acid, vitamin C, (VITAMIN C) 250 MG tablet Take 250 mg by mouth once daily.    aspirin 81 MG Chew Take 81 mg by mouth once daily.    atorvastatin (LIPITOR) 80 MG tablet Take 80 mg by mouth every evening.    carvediloL (COREG) 25 MG tablet Take 1 tablet (25 mg total) by mouth 2 (two) times daily with meals.    cloNIDine (CATAPRES) 0.1 MG tablet Take 1 tablet (0.1 mg total) by mouth 2 (two) times daily.    clopidogreL (PLAVIX) 75 mg tablet Take 75 mg by mouth once daily at 6am.    ezetimibe (ZETIA) 10 mg tablet Take 10 mg by mouth every evening.    glucosamine/chondr sy A sod  (OSTEO BI-FLEX ORAL) Take 1 tablet by mouth once daily at 6am.    hydrALAZINE (APRESOLINE) 100 MG tablet Take 1 tablet (100 mg total) by mouth every 8 (eight) hours.    multivitamin with folic acid 400 mcg Tab Take 1 tablet by mouth once daily.    sertraline (ZOLOFT) 50 MG tablet Take 1 tablet (50 mg total) by mouth once daily.    traZODone (DESYREL) 100 MG tablet Take 1 tablet (100 mg total) by mouth every evening.     Family History       Problem Relation (Age of Onset)    No Known Problems Sister, Brother, Daughter, Son    Parkinsonism Mother    Stroke Father          Tobacco Use    Smoking status: Former     Types: Cigarettes     Start date:      Quit date:      Years since quittin.2    Smokeless tobacco: Never   Substance and Sexual Activity    Alcohol use: Not Currently    Drug use: Never    Sexual activity: Not Currently     Review of Systems   Constitutional:  Negative for appetite change, fatigue and fever.   Respiratory:  Negative for shortness of breath and wheezing.    Cardiovascular:  Positive for leg swelling. Negative for chest pain.   Gastrointestinal:  Negative for abdominal distention, abdominal pain, constipation and nausea.   Genitourinary:  Negative for difficulty urinating, dysuria and frequency.   Musculoskeletal:  Negative for arthralgias, joint swelling and myalgias.   Skin:  Negative for rash and wound.   Psychiatric/Behavioral:  Negative for agitation, behavioral problems and confusion.      Objective:     Vital Signs (Most Recent):  Temp: 98.1 °F (36.7 °C) (25)  Pulse: 72 (25)  Resp: 20 (25)  BP: (!) 157/65 (25)  SpO2: 95 % (25) Vital Signs (24h Range):  Temp:  [97.8 °F (36.6 °C)-98.1 °F (36.7 °C)] 98.1 °F (36.7 °C)  Pulse:  [71-89] 72  Resp:  [18-24] 20  SpO2:  [88 %-99 %] 95 %  BP: (138-210)/() 157/65     Weight: 69.4 kg (153 lb)  Body mass index is 24.69 kg/m².     Physical Exam  Vitals reviewed. Exam conducted  with a chaperone present.   Constitutional:       Appearance: Normal appearance.   HENT:      Head: Normocephalic and atraumatic.      Nose: Nose normal.   Cardiovascular:      Rate and Rhythm: Normal rate and regular rhythm.   Pulmonary:      Effort: Pulmonary effort is normal.   Musculoskeletal:         General: Normal range of motion.      Cervical back: Normal range of motion and neck supple.   Skin:     General: Skin is dry.   Neurological:      Mental Status: He is alert and oriented to person, place, and time.   Psychiatric:         Mood and Affect: Mood normal.         Behavior: Behavior normal.         Thought Content: Thought content normal.         Judgment: Judgment normal.                Significant Labs: All pertinent labs within the past 24 hours have been reviewed.  Recent Lab Results  (Last 5 results in the past 24 hours)        03/29/25  2121   03/29/25  1951   03/29/25  1702   03/29/25  1645   03/29/25  1506        RSV Ag by Molecular Method         Not Detected       Influenza A, Molecular         Not Detected       Influenza B, Molecular         Not Detected       Albumin/Globulin Ratio         1.0       Albumin         3.9       ALP         120       ALT         18       Anion Gap         8.0       Appearance, UA       Clear         PTT   28.3  Comment: For Minimal Heparin Infusion, the goal aPTT 64-85 seconds corresponds to an anti-Xa of 0.3-0.5.    For Low Intensity and High Intensity Heparin, the goal aPTT  seconds corresponds to an anti-Xa of 0.3-0.7             AST         23       Bacteria, UA       Rare         Baso #         0.05       Basophil %         0.5       Bilirubin (UA)       Negative         BILIRUBIN TOTAL         0.5       BNP         2,861.4       BUN         26.0       BUN/CREAT RATIO         21       Calcium         9.4       Chloride         111       CO2         23       Color, UA       Yellow         Creatinine         1.23       eGFR         59  Comment:  Estimated GFR calculated using the CKD-EPI creatinine (2021) equation.       Eos #         0.39       Eos %         4.2       Globulin, Total         3.9       Glucose         138       Glucose, UA       Negative         Hematocrit         36.7       Hemoglobin         11.6       Immature Grans (Abs)         0.01       Immature Granulocytes         0.1       INR   1.2             Ketones, UA       Negative         Leukocyte Esterase, UA       Negative         Lymph #         1.80       LYMPH %         19.3       MCH         28.0       MCHC         31.6       MCV         88.4       Mono #         0.55       Mono %         5.9       MPV         10.5       Neut #         6.51       Neut %         70.0       NITRITE UA       Negative         nRBC         0.0       Blood, UA       Negative         pH, UA       6.5         Platelet Count         285       Potassium         4.1       PROTEIN TOTAL         7.8       Protein, UA       2+         PT   12.0             RBC         4.15       RBC, UA       None Seen         RDW         15.9       SARS-CoV2 (COVID-19) Qualitative PCR         Not Detected       Sodium         142       Specific Gravity,UA       1.020         Squam Epithel, UA       Few         Troponin I 0.111     0.060     0.039       Urobilinogen, UA       0.2         WBC, UA       0-2         WBC         9.31                              Significant Imaging: I have reviewed all pertinent imaging results/findings within the past 24 hours.  Assessment/Plan:     Assessment & Plan  COPD exacerbation  Admit to med surg.  Treating with IV rocephin and PO doxy, steroids, duonebs, and supplemental O2 prn.   Trending troponins q6h.  AM labs.  VTE ppx: heparin drip. Once this is dc'd we will need to replace.    Arteriosclerosis of coronary artery  Continue meds per [facility].   Cardiomyopathy  Continue meds per [facility].   May consider additional IV lasix based on pt's progress.   Chronic kidney disease   Renally dose meds. Avoid  nephrotoxic medications and procedures.  Congestive heart failure  Recent Labs     03/29/25  1506   BNP 2,861.4*     Latest ECHO  No results found for this or any previous visit.    Current Heart Failure Medications  , Daily PRN, Oral  , 2 times daily, Oral  carvediloL tablet 25 mg, 2 times daily with meals, Oral  sacubitriL-valsartan 24-26 mg per tablet 2 tablet, 2 times daily, Oral  furosemide tablet 40 mg, Daily, Oral    Plan  - Monitor strict I&Os and daily weights.    - Place on telemetry  - Low sodium diet  Echo ordrered per [facility].        Hyperlipidemia  Continue statin    NSTEMI (non-ST elevated myocardial infarction)  Following recs per [facility].    Acute respiratory failure with hypoxia  Pt was on RA prior to coming to the floor. Stable.  VTE Risk Mitigation (From admission, onward)           Ordered     IP VTE HIGH RISK PATIENT  Once         03/29/25 2053     Place sequential compression device  Until discontinued         03/29/25 2053     heparin 25,000 units in dextrose 5% (100 units/ml) IV bolus from bag LOW INTENSITY nomogram - [facility]  As needed (PRN)        Question:  Heparin Infusion Adjustment (DO NOT MODIFY ANSWER)  Answer:  \\[email or web address]\epic\Images\Pharmacy\HeparinInfusions\heparin LOW INTENSITY nomogram for [facility] [email or web address]    03/29/25 1936     heparin 25,000 units in dextrose 5% (100 units/ml) IV bolus from bag LOW INTENSITY nomogram - [facility]  As needed (PRN)        Question:  Heparin Infusion Adjustment (DO NOT MODIFY ANSWER)  Answer:  \\[email or web address]\epic\Images\Pharmacy\HeparinInfusions\heparin LOW INTENSITY nomogram for [facility] [id number]    03/29/25 1936     heparin 25,000 units in dextrose 5% 250 mL (100 units/mL) infusion LOW INTENSITY nomogram - [facility]  Continuous        Question:  Begin at (units/kg/hr)  Answer:  12    03/29/25 1936                       On 03/29/2025, patient should be placed in hospital observation services under my care.             NJ SANCHEZ, DO  Department of Hospital  Medicine  [facility]

## 2025-03-30 NOTE — ASSESSMENT & PLAN NOTE
Recent Labs     03/29/25  1506   BNP 2,861.4*     Latest ECHO  No results found for this or any previous visit.    Current Heart Failure Medications  , Daily PRN, Oral  , 2 times daily, Oral  carvediloL tablet 25 mg, 2 times daily with meals, Oral  sacubitriL-valsartan 24-26 mg per tablet 2 tablet, 2 times daily, Oral  furosemide tablet 40 mg, Daily, Oral    Plan  - Monitor strict I&Os and daily weights.    - Place on telemetry  - Low sodium diet  Echo ordrered per [facility].

## 2025-03-30 NOTE — ASSESSMENT & PLAN NOTE
Continue meds per [facility].   May consider additional IV lasix based on pt's progress.     3/30/25: Pt received an additional lasix 20mg iv this morning. Will take lasix 40mg Po daily for 5 days and then monitor weight.

## 2025-03-30 NOTE — PLAN OF CARE
Problem: Adult Inpatient Plan of Care  Goal: Plan of Care Review  Outcome: Progressing  Goal: Patient-Specific Goal (Individualized)  Outcome: Progressing  Goal: Absence of Hospital-Acquired Illness or Injury  Outcome: Progressing  Goal: Optimal Comfort and Wellbeing  Outcome: Progressing  Goal: Readiness for Transition of Care  Outcome: Progressing     Problem: Wound  Goal: Optimal Coping  Outcome: Met  Goal: Optimal Functional Ability  Outcome: Met  Goal: Absence of Infection Signs and Symptoms  Outcome: Met  Goal: Improved Oral Intake  Outcome: Met  Goal: Optimal Pain Control and Function  Outcome: Met  Goal: Skin Health and Integrity  Outcome: Met  Goal: Optimal Wound Healing  Outcome: Met     Problem: Fatigue  Goal: Improved Activity Tolerance  Outcome: Progressing     Problem: Gas Exchange Impaired  Goal: Optimal Gas Exchange  Outcome: Progressing     Problem: Fluid Volume Excess  Goal: Fluid Balance  Outcome: Progressing     Problem: Heart Failure  Goal: Optimal Coping  Outcome: Progressing  Goal: Optimal Cardiac Output  Outcome: Progressing  Goal: Stable Heart Rate and Rhythm  Outcome: Progressing  Goal: Optimal Functional Ability  Outcome: Progressing  Goal: Fluid and Electrolyte Balance  Outcome: Progressing  Goal: Improved Oral Intake  Outcome: Progressing  Goal: Effective Oxygenation and Ventilation  Outcome: Progressing  Goal: Effective Breathing Pattern During Sleep  Outcome: Progressing     Problem: Hypertension Acute  Goal: Blood Pressure Within Desired Range  Outcome: Progressing     Problem: Pneumonia  Goal: Fluid Balance  Outcome: Progressing  Goal: Resolution of Infection Signs and Symptoms  Outcome: Progressing  Goal: Effective Oxygenation and Ventilation  Outcome: Progressing     Problem: VTE (Venous Thromboembolism)  Goal: Tissue Perfusion  Outcome: Progressing  Goal: Right Ventricular Function  Outcome: Progressing

## 2025-03-30 NOTE — ASSESSMENT & PLAN NOTE
Admit to med surg.  Treating with IV rocephin and PO doxy, steroids, duonebs, and supplemental O2 prn.   Trending troponins q6h.  AM labs.  VTE ppx: heparin drip. Once this is dc'd we will need to replace.

## 2025-03-30 NOTE — SUBJECTIVE & OBJECTIVE
Past Medical History:   Diagnosis Date    Aortic stenosis     Carpal tunnel syndrome     CKD (chronic kidney disease)     Coronary artery disease     Disorder of kidney and ureter     Dyslipidemia     Hyperlipidemia     Hypertension     IGT (impaired glucose tolerance)     PVD (peripheral vascular disease)        Past Surgical History:   Procedure Laterality Date    CARDIAC VALVE REPLACEMENT      CAROTID STENT      COLONOSCOPY N/A 5/14/2024    Procedure: COLONOSCOPY;  Surgeon: Jose C Bassett MD;  Location: [facility];  Service: Gastroenterology;  Laterality: N/A;    femoral-popliteal artery bypass      Renal artery angiogram      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     Current Outpatient Medications on File Prior to Encounter   Medication Sig    ENTRESTO 49-51 mg per tablet Take 1 tablet by mouth 2 (two) times daily.    furosemide (LASIX) 40 MG tablet Take 40 mg by mouth daily as needed.    nitroGLYCERIN (NITROSTAT) 0.4 MG SL tablet Place under the tongue.    ondansetron (ZOFRAN) 4 MG tablet Take by mouth.    pantoprazole (PROTONIX) 40 MG tablet Take 40 mg by mouth every morning.    amLODIPine (NORVASC) 10 MG tablet Take 10 mg by mouth every evening.    ascorbic acid, vitamin C, (VITAMIN C) 250 MG tablet Take 250 mg by mouth once daily.    aspirin 81 MG Chew Take 81 mg by mouth once daily.    atorvastatin (LIPITOR) 80 MG tablet Take 80 mg by mouth every evening.    carvediloL (COREG) 25 MG tablet Take 1 tablet (25 mg total) by mouth 2 (two) times daily with meals.    cloNIDine (CATAPRES) 0.1 MG tablet Take 1 tablet (0.1 mg total) by mouth 2 (two) times daily.    clopidogreL (PLAVIX) 75 mg tablet Take 75 mg by mouth once daily at 6am.    ezetimibe (ZETIA) 10 mg tablet Take 10 mg by mouth every evening.    glucosamine/chondr sy A sod (OSTEO  BI-FLEX ORAL) Take 1 tablet by mouth once daily at 6am.    hydrALAZINE (APRESOLINE) 100 MG tablet Take 1 tablet (100 mg total) by mouth every 8 (eight) hours.    multivitamin with folic acid 400 mcg Tab Take 1 tablet by mouth once daily.    sertraline (ZOLOFT) 50 MG tablet Take 1 tablet (50 mg total) by mouth once daily.    traZODone (DESYREL) 100 MG tablet Take 1 tablet (100 mg total) by mouth every evening.     Family History       Problem Relation (Age of Onset)    No Known Problems Sister, Brother, Daughter, Son    Parkinsonism Mother    Stroke Father          Tobacco Use    Smoking status: Former     Types: Cigarettes     Start date:      Quit date:      Years since quittin.2    Smokeless tobacco: Never   Substance and Sexual Activity    Alcohol use: Not Currently    Drug use: Never    Sexual activity: Not Currently     Review of Systems   Constitutional:  Negative for appetite change, fatigue and fever.   Respiratory:  Negative for shortness of breath and wheezing.    Cardiovascular:  Positive for leg swelling. Negative for chest pain.   Gastrointestinal:  Negative for abdominal distention, abdominal pain, constipation and nausea.   Genitourinary:  Negative for difficulty urinating, dysuria and frequency.   Musculoskeletal:  Negative for arthralgias, joint swelling and myalgias.   Skin:  Negative for rash and wound.   Psychiatric/Behavioral:  Negative for agitation, behavioral problems and confusion.      Objective:     Vital Signs (Most Recent):  Temp: 98.1 °F (36.7 °C) (25)  Pulse: 72 (25)  Resp: 20 (25)  BP: (!) 157/65 (25)  SpO2: 95 % (25) Vital Signs (24h Range):  Temp:  [97.8 °F (36.6 °C)-98.1 °F (36.7 °C)] 98.1 °F (36.7 °C)  Pulse:  [71-89] 72  Resp:  [18-24] 20  SpO2:  [88 %-99 %] 95 %  BP: (138-210)/() 157/65     Weight: 69.4 kg (153 lb)  Body mass index is 24.69 kg/m².     Physical Exam  Vitals reviewed. Exam conducted with a  chaperone present.   Constitutional:       Appearance: Normal appearance.   HENT:      Head: Normocephalic and atraumatic.      Nose: Nose normal.   Cardiovascular:      Rate and Rhythm: Normal rate and regular rhythm.   Pulmonary:      Effort: Pulmonary effort is normal.   Musculoskeletal:         General: Normal range of motion.      Cervical back: Normal range of motion and neck supple.   Skin:     General: Skin is dry.   Neurological:      Mental Status: He is alert and oriented to person, place, and time.   Psychiatric:         Mood and Affect: Mood normal.         Behavior: Behavior normal.         Thought Content: Thought content normal.         Judgment: Judgment normal.                Significant Labs: All pertinent labs within the past 24 hours have been reviewed.  Recent Lab Results  (Last 5 results in the past 24 hours)        03/29/25  2121   03/29/25  1951   03/29/25  1702   03/29/25  1645   03/29/25  1506        RSV Ag by Molecular Method         Not Detected       Influenza A, Molecular         Not Detected       Influenza B, Molecular         Not Detected       Albumin/Globulin Ratio         1.0       Albumin         3.9       ALP         120       ALT         18       Anion Gap         8.0       Appearance, UA       Clear         PTT   28.3  Comment: For Minimal Heparin Infusion, the goal aPTT 64-85 seconds corresponds to an anti-Xa of 0.3-0.5.    For Low Intensity and High Intensity Heparin, the goal aPTT  seconds corresponds to an anti-Xa of 0.3-0.7             AST         23       Bacteria, UA       Rare         Baso #         0.05       Basophil %         0.5       Bilirubin (UA)       Negative         BILIRUBIN TOTAL         0.5       BNP         2,861.4       BUN         26.0       BUN/CREAT RATIO         21       Calcium         9.4       Chloride         111       CO2         23       Color, UA       Yellow         Creatinine         1.23       eGFR         59  Comment: Estimated GFR  calculated using the CKD-EPI creatinine (2021) equation.       Eos #         0.39       Eos %         4.2       Globulin, Total         3.9       Glucose         138       Glucose, UA       Negative         Hematocrit         36.7       Hemoglobin         11.6       Immature Grans (Abs)         0.01       Immature Granulocytes         0.1       INR   1.2             Ketones, UA       Negative         Leukocyte Esterase, UA       Negative         Lymph #         1.80       LYMPH %         19.3       MCH         28.0       MCHC         31.6       MCV         88.4       Mono #         0.55       Mono %         5.9       MPV         10.5       Neut #         6.51       Neut %         70.0       NITRITE UA       Negative         nRBC         0.0       Blood, UA       Negative         pH, UA       6.5         Platelet Count         285       Potassium         4.1       PROTEIN TOTAL         7.8       Protein, UA       2+         PT   12.0             RBC         4.15       RBC, UA       None Seen         RDW         15.9       SARS-CoV2 (COVID-19) Qualitative PCR         Not Detected       Sodium         142       Specific Gravity,UA       1.020         Squam Epithel, UA       Few         Troponin I 0.111     0.060     0.039       Urobilinogen, UA       0.2         WBC, UA       0-2         WBC         9.31                              Significant Imaging: I have reviewed all pertinent imaging results/findings within the past 24 hours.

## 2025-03-30 NOTE — HOSPITAL COURSE
3/30/25: Pt sitting up in bed. He states that he is feeling good. Since he arrived to the floor last night he was stating that he was back to his baseline. He has been getting up and going to the bathroom with no issues. Troponin peaked at 0.115. Heparin drip dc'd after troponin began to trend down. BNP increased this morning. Another dose of IV lasix given this morning. Pt states he would like to go home. I instructed him to take lasix 40mg daily for 5 days and then prn daily after this for 2lb or greater weight gain. He will follow-up with cardiology. He is changing to Dr. Gama for cardiology.     PE:   General: alert and oriented.  HEENT: NC, AT.  Resp: CTAB.  Cardio: RRR, no m/r/g. Non-pitting edema.  Abd: soft, NT, ND, + BS x 4

## 2025-03-30 NOTE — ASSESSMENT & PLAN NOTE
Received lasix IV in ER. Seems to have possibly contributed to pt's quick improvement. Pt has prn home lasix. CXR did have congestion.  Recent Labs     03/29/25  1506 03/30/25  0233   BNP 2,861.4* 3,263.9*     Latest ECHO  No results found for this or any previous visit.    Current Heart Failure Medications  , 2 times daily, Oral  carvediloL tablet 25 mg, 2 times daily with meals, Oral  sacubitriL-valsartan 24-26 mg per tablet 2 tablet, 2 times daily, Oral  furosemide tablet 40 mg, Daily, Oral  furosemide (LASIX) tablet, Daily, Oral    Plan  - Monitor strict I&Os and daily weights.    - Place on telemetry  - Low sodium diet

## 2025-03-30 NOTE — NURSING
Shanna with Lab called critical PTT of >139. Heparin infusion stopped immediately, heparin protocol being followed at this time. Dr Bullard aware of above.

## 2025-03-30 NOTE — DISCHARGE SUMMARY
"[facility] - [facility] Surgical Unit  Hospital Medicine  Discharge Summary      Patient Name: Justyn Perez  MRN: [id number]  PALOMO: [id number]  Patient Class: OP- Observation  Admission Date: 3/29/2025  Hospital Length of Stay: 0 days  Discharge Date and Time:  03/30/2025 12:11 PM  Attending Physician: Shani Bullard DO   Discharging Provider: SHANI SANCHEZ DO  Primary Care Provider: Rodney Jalloh NP    Primary Care Team: Networked reference to record PCT     HPI:   Examination was performed via telemedicine by myself, located at my home, with the assistance of the nurse, Aaliyah, located at [facility].     78 yo CM with PMH of CAD with multiple stents, CKD, former smoker, HLD, HTN, GERD, COPD presented to the ER with SOB. Vitals with tachypnea, tachycardia and SpO2 88%. Workup in the ER revealed first troponin normal, then 2nd increased to 0.06. EKG without ST elevation. Pt was without CP. BNP 2861. CXR with "Impression: Enlarged cardiac silhouette.  Interstitial opacities may be related to interstitial edema or atypical infection.  Correlate with BNP." Pt was given neb treatments and steroids at 1500 and felt significantly better after that, per ER physician. He was given lasix around 1600. Heparin drip was started per [facility] recs. Hospitalist consulted for admission.    Upon my exam, pt was lying comfortably in bed. He states that he feels 100% better. UOP was 1,150 ml so far in computer. Possibly with an additional 350ml since arriving at the floor. He has not taken the lasix at home today or yesterday. States he had to take it twice recently though for SOB. Pt denies any SOB with moving around. States it is back to baseline. Also denies ever having CP. Denies bladder issues. No N/V/D. States he had a large BM while in transport from the ER to the floor and was unsure why.    * No surgery found *      Hospital Course:   3/30/25: Pt sitting up in bed. He states that he is feeling good. Since " he arrived to the floor last night he was stating that he was back to his baseline. He has been getting up and going to the bathroom with no issues. Troponin peaked at 0.115. Heparin drip dc'd after troponin began to trend down. BNP increased this morning. Another dose of IV lasix given this morning. Pt states he would like to go home. I instructed him to take lasix 40mg daily for 5 days and then prn daily after this for 2lb or greater weight gain. He will follow-up with cardiology. He is changing to Dr. Gama for cardiology.     PE:   General: alert and oriented.  HEENT: NC, AT.  Resp: CTAB.  Cardio: RRR, no m/r/g. Non-pitting edema.  Abd: soft, NT, ND, + BS x 4       Goals of Care Treatment Preferences:  Code Status: Full Code         Consults:   Consults (From admission, onward)          Status Ordering Provider     Inpatient consult to Cardiology  Once        Provider:  Roel Henderson AGACNP-BC    Acknowledged [place] CAMPA     Inpatient consult to Telemedicine-Card  Once        Provider:  Landry Dobbs MD    Acknowledged [place] CAMPA            Assessment & Plan  Arteriosclerosis of coronary artery  Continue meds per [facility].   Cardiomyopathy  Continue meds per [facility].   May consider additional IV lasix based on pt's progress.     3/30/25: Pt received an additional lasix 20mg iv this morning. Will take lasix 40mg Po daily for 5 days and then monitor weight.   Chronic kidney disease  Renally dose meds. Avoid nephrotoxic medications and procedures.  Congestive heart failure  Recent Labs     03/29/25  1506 03/30/25  0233   BNP 2,861.4* 3,263.9*     Latest ECHO  No results found for this or any previous visit.    Current Heart Failure Medications  , 2 times daily, Oral  carvediloL tablet 25 mg, 2 times daily with meals, Oral  sacubitriL-valsartan 24-26 mg per tablet 2 tablet, 2 times daily, Oral  furosemide tablet 40 mg, Daily, Oral  furosemide (LASIX) tablet, Daily, Oral    Plan  - Monitor strict I&Os and  daily weights.    - Place on telemetry  - Low sodium diet  Echo ordrered per [facility].        Hyperlipidemia  Continue statin    Acute on chronic diastolic CHF (congestive heart failure) (Resolved: 3/30/2025)  Received lasix IV in ER. Seems to have possibly contributed to pt's quick improvement. Pt has prn home lasix. CXR did have congestion.  Recent Labs     03/29/25  1506 03/30/25  0233   BNP 2,861.4* 3,263.9*     Latest ECHO  No results found for this or any previous visit.    Current Heart Failure Medications  , 2 times daily, Oral  carvediloL tablet 25 mg, 2 times daily with meals, Oral  sacubitriL-valsartan 24-26 mg per tablet 2 tablet, 2 times daily, Oral  furosemide tablet 40 mg, Daily, Oral  furosemide (LASIX) tablet, Daily, Oral    Plan  - Monitor strict I&Os and daily weights.    - Place on telemetry  - Low sodium diet        Final Active Diagnoses:    Diagnosis Date Noted POA    Arteriosclerosis of coronary artery [I25.10] 08/03/2022 Yes    Cardiomyopathy [I42.9] 08/03/2022 Yes    Chronic kidney disease [N18.9] 08/03/2022 Yes    Congestive heart failure [I50.9] 08/03/2022 Yes    Hyperlipidemia [E78.5] 08/03/2022 Yes      Problems Resolved During this Admission:    Diagnosis Date Noted Date Resolved POA    PRINCIPAL PROBLEM:  COPD exacerbation [J44.1] 03/29/2025 03/30/2025 Yes    NSTEMI (non-ST elevated myocardial infarction) [I21.4] 03/29/2025 03/30/2025 Yes    Acute respiratory failure with hypoxia [J96.01] 03/29/2025 03/30/2025 Yes    Acute on chronic diastolic CHF (congestive heart failure) [I50.33] 03/29/2025 03/30/2025 Yes       Discharged Condition: good    Disposition: Home or Self Care    Follow Up:   Follow-up Information       Rodney Jalloh NP Follow up.    Specialty: Family Medicine  Contact information:  [address] [facility] [address]  [phone]               Hubert Gama MD Follow up.    Specialty: Cardiology  Contact information:  [facility] [address]  [place] [address]  [phone]                           Patient Instructions:   No discharge procedures on file.    Significant Diagnostic Studies: N/A    Pending Diagnostic Studies:       Procedure Component Value Units Date/Time    Echo [[id number]]     Order Status: Sent Lab Status: No result     Troponin I [[id number]]     Order Status: Sent Lab Status: No result     Specimen: Blood            Medications:  Reconciled Home Medications:      Medication List        CHANGE how you take these medications      furosemide 40 MG tablet  Commonly known as: LASIX  Take 1 tablet (40 mg total) by mouth once daily. for 5 days  What changed:   when to take this  reasons to take this            CONTINUE taking these medications      amLODIPine 10 MG tablet  Commonly known as: NORVASC  Take 10 mg by mouth every evening.     ascorbic acid (vitamin C) 250 MG tablet  Commonly known as: VITAMIN C  Take 250 mg by mouth once daily.     aspirin 81 MG Chew  Take 81 mg by mouth once daily.     atorvastatin 80 MG tablet  Commonly known as: LIPITOR  Take 80 mg by mouth every evening.     carvediloL 25 MG tablet  Commonly known as: COREG  Take 1 tablet (25 mg total) by mouth 2 (two) times daily with meals.     cloNIDine 0.1 MG tablet  Commonly known as: CATAPRES  Take 1 tablet (0.1 mg total) by mouth 2 (two) times daily.     clopidogreL 75 mg tablet  Commonly known as: PLAVIX  Take 75 mg by mouth once daily at 6am.     ENTRESTO 49-51 mg per tablet  Generic drug: sacubitriL-valsartan  Take 1 tablet by mouth 2 (two) times daily.     ezetimibe 10 mg tablet  Commonly known as: ZETIA  Take 10 mg by mouth every evening.     hydrALAZINE 100 MG tablet  Commonly known as: APRESOLINE  Take 1 tablet (100 mg total) by mouth every 8 (eight) hours.     multivitamin with folic acid 400 mcg Tab  Take 1 tablet by mouth once daily.     nitroGLYCERIN 0.4 MG SL tablet  Commonly known as: NITROSTAT  Place under the tongue.     ondansetron 4 MG  tablet  Commonly known as: ZOFRAN  Take by mouth.     OSTEO BI-FLEX ORAL  Take 1 tablet by mouth once daily at 6am.     pantoprazole 40 MG tablet  Commonly known as: PROTONIX  Take 40 mg by mouth every morning.     sertraline 50 MG tablet  Commonly known as: ZOLOFT  Take 1 tablet (50 mg total) by mouth once daily.     traZODone 100 MG tablet  Commonly known as: DESYREL  Take 1 tablet (100 mg total) by mouth every evening.              Indwelling Lines/Drains at time of discharge:   Lines/Drains/Airways       None                   Time spent on the discharge of patient: 35 minutes         [place] SANCHEZ DO  Department of Hospital Medicine  [facility]

## 2025-03-30 NOTE — PLAN OF CARE
Problem: Wound  Goal: Optimal Coping  Outcome: Met  Goal: Optimal Functional Ability  Outcome: Met  Goal: Absence of Infection Signs and Symptoms  Outcome: Met  Goal: Improved Oral Intake  Outcome: Met  Goal: Optimal Pain Control and Function  Outcome: Met  Goal: Skin Health and Integrity  Outcome: Met  Goal: Optimal Wound Healing  Outcome: Met     Old care plan, from previous admission

## 2025-03-31 ENCOUNTER — PATIENT OUTREACH (OUTPATIENT)
Dept: ADMINISTRATIVE | Facility: CLINIC | Age: 80
End: 2025-03-31
Payer: MEDICARE

## 2025-03-31 NOTE — PROGRESS NOTES
C3 nurse spoke with Justyn Perez  for a TCC post hospital discharge follow up call. The patient has a scheduled HOSFU appointment with Marla Mondragon @ [facility] on 04/07/2025 @ 9 am.

## 2025-03-31 NOTE — PROGRESS NOTES
C3 nurse attempted to contact Justyn Perez  for a TCC post hospital discharge follow up call. No answer. No voicemail available.The patient does not have a scheduled HOSFU appointment noted. Unable to message PCP staff.

## (undated) DEVICE — CLIP RESOLUTION 360 ULT 235CM

## (undated) DEVICE — INJECTION NDL 23G/4MM SZ 2.8MM

## (undated) DEVICE — PACK ENDOSCOPY GENERAL

## (undated) DEVICE — Device

## (undated) DEVICE — MARKER ENDOSCOPIC SPOT EX

## (undated) DEVICE — TUBE WATER AUXILIARY

## (undated) DEVICE — SNARE EXACTO COLD

## (undated) DEVICE — SOL STERILE WATER INJ 1000ML

## (undated) DEVICE — FORCEP ALLIGATOR BX NDL 2.8MM

## (undated) DEVICE — TRAPEASE POLYP SINGLE 29-750